# Patient Record
Sex: FEMALE | Race: WHITE | NOT HISPANIC OR LATINO | Employment: FULL TIME | URBAN - METROPOLITAN AREA
[De-identification: names, ages, dates, MRNs, and addresses within clinical notes are randomized per-mention and may not be internally consistent; named-entity substitution may affect disease eponyms.]

---

## 2017-01-18 ENCOUNTER — ALLSCRIPTS OFFICE VISIT (OUTPATIENT)
Dept: OTHER | Facility: OTHER | Age: 52
End: 2017-01-18

## 2017-03-13 ENCOUNTER — ALLSCRIPTS OFFICE VISIT (OUTPATIENT)
Dept: OTHER | Facility: OTHER | Age: 52
End: 2017-03-13

## 2017-03-13 DIAGNOSIS — E11.9 TYPE 2 DIABETES MELLITUS WITHOUT COMPLICATIONS (HCC): ICD-10-CM

## 2017-07-14 ENCOUNTER — GENERIC CONVERSION - ENCOUNTER (OUTPATIENT)
Dept: OTHER | Facility: OTHER | Age: 52
End: 2017-07-14

## 2017-07-21 ENCOUNTER — ALLSCRIPTS OFFICE VISIT (OUTPATIENT)
Dept: OTHER | Facility: OTHER | Age: 52
End: 2017-07-21

## 2017-10-20 ENCOUNTER — ALLSCRIPTS OFFICE VISIT (OUTPATIENT)
Dept: OTHER | Facility: OTHER | Age: 52
End: 2017-10-20

## 2017-10-20 LAB — HBA1C MFR BLD HPLC: 7.2 %

## 2017-10-21 NOTE — PROGRESS NOTES
Assessment  1  Diabetes mellitus (250 00) (E11 9)   2  Benign essential hypertension (401 1) (I10)   3  Acute sinusitis (461 9) (J01 90)   4  Hyperlipemia (272 4) (E78 5)    Plan  Acute sinusitis    · Azithromycin 250 MG Oral Tablet; TAKE 2 TABLETS ON DAY 1 THEN TAKE 1 TABLET A DAY  FOR 4 DAYS  Benign essential hypertension    · Lisinopril 20 MG Oral Tablet; TAKE 1 TABLET BY MOUTH ONCE DAILY  Diabetes mellitus    · GlipiZIDE 10 MG Oral Tablet; TAKE 1 TABLET DAILY   · MetFORMIN HCl - 1000 MG Oral Tablet; take one tablet by mouth twice daily   · *VB - Foot Exam; Status:Complete - Retrospective Authorization;   Done: 81DXQ8254 11:07AM   · Hemoglobin A1c- POC; Status:Complete - Retrospective Authorization;   Done: 22UMW7096 11:06AM  Health Maintenance    · Stop: Fluzone Quadrivalent Intramuscular Suspension  Hyperlipemia    · Gemfibrozil 600 MG Oral Tablet; TAKE 1 TABLET TWICE DAILY, ONE HOUR BEFORE  MEALS   · Welchol 625 MG Oral Tablet; TAKE 3 TABLETS TWICE DAILY   · Zetia 10 MG Oral Tablet (Ezetimibe); Take 1 tablet daily    Discussion/Summary  Discussion Summary: We will followup in 3 months and see how things go  Counseling Documentation With Imm: The patient was counseled regarding impressions  Chief Complaint  Chief Complaint Free Text Note Form: Pt presents today for RTN c/o nasal congestion  Foot exam due today  HgbA1c today 7 2%  Declines flu shot  History of Present Illness  HPI: The patient was seen and examined and noted to have issues with sinus symptoms  We last HgbA1c was 7 2% and she has had no hypoglycemia at this point  The patient states that she has had no issues with her current cholesterol meds  Her BP is elevated, but she is tolerating the Lisinopril 20 mg  Cold Symptoms:   BABAR LEUNG presents with complaints of sudden onset of constant episodes of moderate cold symptoms  Episodes started 7 days ago  Her symptoms are probably caused by community exposure to URI   Symptoms are improved by OTC cold medications  Symptoms are worsening  Associated symptoms include nasal congestion,-- runny nose,-- post nasal drainage,-- sore throat,-- productive cough-- and-- facial pressure, but-- no sneezing,-- no scratchy throat,-- no hoarseness,-- no dry cough,-- no facial pain,-- no headache,-- no plugged ear(s),-- no ear pain,-- no swollen lymph nodes,-- no wheezing,-- no shortness of breath,-- no fatigue,-- no weakness,-- no nausea,-- no vomiting,-- no diarrhea,-- no fever-- and-- no chills  Review of Systems  Complete-Female:   Constitutional: as noted in HPI,-- no fever,-- no recent weight gain-- and-- no chills  Eyes: No complaints of eye pain, no red eyes, no eyesight problems, no discharge, no dry eyes, no itching of eyes  ENT: as noted in HPI  Cardiovascular: no chest pain,-- no intermittent leg claudication-- and-- no palpitations  Respiratory: as noted in HPI,-- no shortness of breath-- and-- no orthopnea  Gastrointestinal: as noted in HPI  Musculoskeletal: no arthralgias-- and-- no myalgias  Neurological: as noted in HPI  Active Problems  1  Acute sinusitis (461 9) (J01 90)   2  Benign essential hypertension (401 1) (I10)   3  Diabetes mellitus (250 00) (E11 9)   4  History of stroke without residual deficits (V12 54) (Z86 73)   5  Hyperlipemia (272 4) (E78 5)   6  Labyrinthitis, unspecified laterality (386 30) (H83 09)   7  Need for influenza vaccination (V04 81) (Z23)   8  Non Hodgkin's lymphoma (202 80) (C85 90)   9  Screening for depression (V79 0) (Z13 89)   10  Skin cancer (173 90) (C44 90)   11  Systemic lupus erythematosus (710 0) (M32 9)   12  Visit for screening mammogram (V76 12) (Z12 31)    Past Medical History  1  Denied: History of mental disorder   2  Denied: History of substance abuse    Family History  Mother    1  Family history of dementia (V17 2) (Z81 8)   2  Family history of Parkinsonism (V17 2) (Z82 0)  Family History    3   Denied: Family history of mental disorder   4  Denied: Family history of substance abuse    Social History   · Denied: History of Alcohol use   · Never a smoker    Current Meds   1  Aggrenox  MG Oral Capsule Extended Release 12 Hour; TAKE 1 CAPSULE TWICE DAILY   WITH MEALS; Therapy: 87VIO2135 to ()  Requested for: 08OOW5114; Last Rx:02Nov2016   Ordered   2  BusPIRone HCl - 15 MG Oral Tablet; take 1/2 tablet twice daily; Therapy: 96LGS4690 to ()  Requested for: 81OKZ8532; Last Rx:02Nov2016   Ordered   3  FreeStyle Test In Vitro Strip; USE 1 STRIP TWICE DAILY; Therapy: 66SER1318 to ()  Requested for: 17FXY8157; Last Rx:02Nov2016   Ordered   4  Gemfibrozil 600 MG Oral Tablet; TAKE 1 TABLET TWICE DAILY, ONE HOUR BEFORE MEALS; Therapy: 29RSL0935 to ()  Requested for: 01Dag3483; Last Rx:02Nov2016   Ordered   5  GlipiZIDE 10 MG Oral Tablet; TAKE 1 TABLET DAILY; Therapy: 39LDM3113 to ()  Requested for: 61Cej0065; Last Rx:02Nov2016   Ordered   6  Lisinopril 20 MG Oral Tablet; TAKE 1 TABLET BY MOUTH ONCE DAILY; Therapy: 83HAP1510 to ()  Requested for: 57Jgl8791; Last Rx:02Nov2016   Ordered   7  MetFORMIN HCl - 1000 MG Oral Tablet; take one tablet by mouth twice daily; Therapy: 48IWR9201 to ()  Requested for: 27Sff5158; Last Rx:02Nov2016   Ordered   8  Welchol 625 MG Oral Tablet; TAKE 3 TABLETS TWICE DAILY; Therapy: 63RCJ1400 to ()  Requested for: 32Gfy1115; Last Rx:02Nov2016   Ordered   9  Zetia 10 MG Oral Tablet; Take 1 tablet daily  Requested for: 41Boq5520; Last Rx:02Nov2016 Ordered  Medication List Reviewed: The medication list was reviewed and updated today  Allergies  1  Penicillins   2  Statins  3   Seafood    Vitals  Vital Signs    Recorded: 50RQF1124 10:59AM   Temperature 98 F   Heart Rate 86   Respiration 18   Systolic 353   Diastolic 82   Height 5 ft 1 in Weight 225 lb 2 oz   BMI Calculated 42 54   BSA Calculated 1 99   O2 Saturation 98     Physical Exam    Constitutional   General appearance: No acute distress, well appearing and well nourished  Eyes   Conjunctiva and lids: No swelling, erythema or discharge  Ears, Nose, Mouth, and Throat   External inspection of ears and nose: Normal     Otoscopic examination: Tympanic membranes translucent with normal light reflex  Canals patent without erythema  Nasal mucosa, septum, and turbinates: Normal without edema or erythema  Oropharynx: Normal with no erythema, edema, exudate or lesions  Pulmonary   Respiratory effort: No increased work of breathing or signs of respiratory distress  Auscultation of lungs: Clear to auscultation  Auscultation of the lungs revealed no expiratory wheezing,-- normal expiratory time-- and-- no inspiratory wheezing  no rales or crackles were heard bilaterally  no rhonchi  no friction rub  no wheezing  no diminished breath sounds  no bronchial breath sounds  Cardiovascular   Auscultation of heart: Normal rate and rhythm, normal S1 and S2, without murmurs  Examination of extremities for edema and/or varicosities: Normal     Carotid pulses: Normal     Abdomen   Abdomen: Non-tender, no masses  Lymphatic   Palpation of lymph nodes in neck: No lymphadenopathy  Musculoskeletal   Gait and station: Normal     Skin   Skin and subcutaneous tissue: Normal without rashes or lesions  Neurologic   Cranial nerves: Cranial nerves 2-12 intact  Psychiatric   Mood and affect: Normal     Diabetic Foot Screen: Normal     Socks and shoes removed, the Right Foot: the foot was normal, no swelling, no erythema  The toes on the right were normal-- and-- with full range of motion  The sensory exam showed  normal vibratory sensation at the level of the toes on the right  Normal tactile sensation with monofilament testing throughout the right foot     Socks and shoes removed, Left Foot: the foot was normal, no swelling, no erythema  The toes on the left were normal-- and-- with full range of motion  The sensory exam showed  normal vibratory sensation at the level of the toes on the left , Normal tactile sensation with monofilament testing throughout the left foot  Capillary refills findings on the right were normal in the toes  Pulses:   2+ in the posterior tibialis on the right   2+ in the dorsalis pedis on the right  Capillary refills findings on the left were normal in the toes  Pulses:   2+ in the posterior tibialis on the left   2+ in the dorsalis pedis on the left  Assign Risk Category: 0: No loss of protective sensation, no deformity  No present risk        Results/Data  *VB - Foot Exam [id number] 11:07AM Nettie Cotton     Test Name Result Flag Reference   FOOT EXAM 10/20/17       Hemoglobin A1c- POC 80GHY6019 11:06AM Nettie Cotton     Test Name Result Flag Reference   HEMOGLOBIN A1C 7 2 A        Signatures   Electronically signed by : Nancy Heath DO; Oct 20 2017 11:37AM EST                       (Author)

## 2017-11-16 ENCOUNTER — GENERIC CONVERSION - ENCOUNTER (OUTPATIENT)
Dept: OTHER | Facility: OTHER | Age: 52
End: 2017-11-16

## 2018-01-13 VITALS
WEIGHT: 226 LBS | HEART RATE: 88 BPM | TEMPERATURE: 97.7 F | DIASTOLIC BLOOD PRESSURE: 76 MMHG | BODY MASS INDEX: 42.67 KG/M2 | RESPIRATION RATE: 18 BRPM | SYSTOLIC BLOOD PRESSURE: 124 MMHG | HEIGHT: 61 IN

## 2018-01-13 VITALS
SYSTOLIC BLOOD PRESSURE: 122 MMHG | BODY MASS INDEX: 42.1 KG/M2 | WEIGHT: 223 LBS | RESPIRATION RATE: 18 BRPM | OXYGEN SATURATION: 98 % | HEIGHT: 61 IN | HEART RATE: 96 BPM | DIASTOLIC BLOOD PRESSURE: 80 MMHG | TEMPERATURE: 98 F

## 2018-01-13 VITALS
TEMPERATURE: 98.8 F | DIASTOLIC BLOOD PRESSURE: 84 MMHG | WEIGHT: 221 LBS | BODY MASS INDEX: 41.72 KG/M2 | RESPIRATION RATE: 18 BRPM | HEIGHT: 61 IN | HEART RATE: 96 BPM | SYSTOLIC BLOOD PRESSURE: 132 MMHG

## 2018-01-13 VITALS
HEART RATE: 86 BPM | OXYGEN SATURATION: 98 % | DIASTOLIC BLOOD PRESSURE: 82 MMHG | WEIGHT: 225.13 LBS | TEMPERATURE: 98 F | RESPIRATION RATE: 18 BRPM | BODY MASS INDEX: 42.51 KG/M2 | HEIGHT: 61 IN | SYSTOLIC BLOOD PRESSURE: 136 MMHG

## 2018-01-16 NOTE — MISCELLANEOUS
Message  Pt stopped in office today stating she is out of generic Aggrenox and according to the task of 11/14/17 a 30 day supply was to be sent into [facility] until she received her rx from [facility]  Phoned in 30 day supply for her and she will be contacting [facility] again to find out why they are not sending her scipt  Active Problems    1  Acute sinusitis (461 9) (J01 90)   2  Benign essential hypertension (401 1) (I10)   3  Diabetes mellitus (250 00) (E11 9)   4  History of stroke without residual deficits (V12 54) (Z86 73)   5  Hyperlipemia (272 4) (E78 5)   6  Labyrinthitis, unspecified laterality (386 30) (H83 09)   7  Need for influenza vaccination (V04 81) (Z23)   8  Non Hodgkin's lymphoma (202 80) (C85 90)   9  Screening for depression (V79 0) (Z13 89)   10  Skin cancer (173 90) (C44 90)   11  Systemic lupus erythematosus (710 0) (M32 9)   12  Visit for screening mammogram (V76 12) (Z12 31)    Current Meds   1  Aspirin-Dipyridamole ER  MG Oral Capsule Extended Release 12 Hour; TAKE 1   CAPSULE TWICE A DAY WITH MEALS; Therapy: 54STC7434 to (Last Rx:30Oct2017)  Requested for: 30Oct2017 Ordered   2  Aspirin-Dipyridamole ER  MG Oral Capsule Extended Release 12 Hour; TAKE 1   CAPSULE TWICE DAILY WITH MEALS; Therapy: 75ELM6522 to (Noa Bennett)  Requested for: 10OKE8588; Last   Rx:14Nov2017 Ordered   3  Azithromycin 250 MG Oral Tablet; TAKE 2 TABLETS ON DAY 1 THEN TAKE 1 TABLET A   DAY FOR 4 DAYS; Therapy: 31ESP1931 to (21 234 564 )  Requested for: 78IGS7584; Last   Rx:20Oct2017 Ordered   4  BusPIRone HCl - 15 MG Oral Tablet; take 1/2 tablet twice daily; Therapy: 69NWN1978 to (21 )  Requested for: 82OAW5248; Last   Rx:02Nov2016 Ordered   5  FreeStyle Test In Vitro Strip; USE 1 STRIP TWICE DAILY; Therapy: 14MAR3064 to (21 )  Requested for: 51TCI8324; Last   Rx:02Nov2016 Ordered   6   Gemfibrozil 600 MG Oral Tablet; TAKE 1 TABLET TWICE DAILY, ONE HOUR BEFORE   MEALS; Therapy: 27TNH1230 to (032 304 86 43)  Requested for: 31BLE8260; Last   Rx:02Nov2016 Ordered   7  GlipiZIDE 10 MG Oral Tablet; TAKE 1 TABLET DAILY; Therapy: 11YWU4911 to (032 304 86 43)  Requested for: 73DRD1450; Last   Rx:02Nov2016 Ordered   8  Lisinopril 20 MG Oral Tablet; TAKE 1 TABLET BY MOUTH ONCE DAILY; Therapy: 50FYB4054 to (032 304 86 43)  Requested for: 99KRN5462; Last   Rx:02Nov2016 Ordered   9  MetFORMIN HCl - 1000 MG Oral Tablet; take one tablet by mouth twice daily; Therapy: 36OLC8016 to (032 304 86 43)  Requested for: 93APJ2874; Last   Rx:02Nov2016 Ordered   10  Welchol 625 MG Oral Tablet; TAKE 3 TABLETS TWICE DAILY; Therapy: 46YWD7589 to (032 304 86 43)  Requested for: 29HBY2819; Last    Rx:02Nov2016 Ordered   11  Zetia 10 MG Oral Tablet; Take 1 tablet daily  Requested for: 01HOS2451; Last    Rx:02Nov2016 Ordered    Allergies    1  Penicillins   2  Statins    3   Seafood    Signatures   Electronically signed by : Ralph Berg, ; Nov 16 2017  2:40PM EST                       (Author)

## 2018-04-08 ENCOUNTER — OFFICE VISIT (OUTPATIENT)
Dept: URGENT CARE | Facility: CLINIC | Age: 53
End: 2018-04-08
Payer: COMMERCIAL

## 2018-04-08 VITALS
RESPIRATION RATE: 18 BRPM | HEART RATE: 87 BPM | OXYGEN SATURATION: 97 % | SYSTOLIC BLOOD PRESSURE: 170 MMHG | TEMPERATURE: 97 F | DIASTOLIC BLOOD PRESSURE: 92 MMHG

## 2018-04-08 DIAGNOSIS — N30.00 ACUTE CYSTITIS WITHOUT HEMATURIA: Primary | ICD-10-CM

## 2018-04-08 LAB
SL AMB  POCT GLUCOSE, UA: ABNORMAL
SL AMB LEUKOCYTE ESTERASE,UA: ABNORMAL
SL AMB POCT BILIRUBIN,UA: ABNORMAL
SL AMB POCT BLOOD,UA: ABNORMAL
SL AMB POCT CLARITY,UA: CLEAR
SL AMB POCT COLOR,UA: YELLOW
SL AMB POCT KETONES,UA: ABNORMAL
SL AMB POCT NITRITE,UA: ABNORMAL
SL AMB POCT PH,UA: 5
SL AMB POCT SPECIFIC GRAVITY,UA: 1.02
SL AMB POCT URINE PROTEIN: ABNORMAL
SL AMB POCT UROBILINOGEN: ABNORMAL

## 2018-04-08 PROCEDURE — 99203 OFFICE O/P NEW LOW 30 MIN: CPT | Performed by: PHYSICIAN ASSISTANT

## 2018-04-08 PROCEDURE — 87086 URINE CULTURE/COLONY COUNT: CPT | Performed by: PHYSICIAN ASSISTANT

## 2018-04-08 PROCEDURE — 81002 URINALYSIS NONAUTO W/O SCOPE: CPT | Performed by: PHYSICIAN ASSISTANT

## 2018-04-08 RX ORDER — GEMFIBROZIL 600 MG/1
TABLET, FILM COATED ORAL 2 TIMES DAILY
COMMUNITY
Start: 2018-01-23 | End: 2019-04-22 | Stop reason: SDUPTHER

## 2018-04-08 RX ORDER — BUSPIRONE HYDROCHLORIDE 15 MG/1
5 TABLET ORAL 2 TIMES DAILY
COMMUNITY
End: 2019-08-16

## 2018-04-08 RX ORDER — LISINOPRIL 20 MG/1
20 TABLET ORAL
COMMUNITY
End: 2018-10-03 | Stop reason: SDUPTHER

## 2018-04-08 RX ORDER — COLESEVELAM 180 1/1
TABLET ORAL
COMMUNITY
End: 2019-04-22 | Stop reason: SDUPTHER

## 2018-04-08 RX ORDER — EZETIMIBE 10 MG/1
10 TABLET ORAL
COMMUNITY
End: 2018-04-19

## 2018-04-08 RX ORDER — NITROFURANTOIN 25; 75 MG/1; MG/1
100 CAPSULE ORAL 2 TIMES DAILY
Qty: 14 CAPSULE | Refills: 0 | Status: SHIPPED | OUTPATIENT
Start: 2018-04-08 | End: 2018-04-15

## 2018-04-08 NOTE — PROGRESS NOTES
[facility]        NAME: Doug Dunbar is a 46 y o  female  : 1965    MRN: [id number]  DATE: 2018  TIME: 5:13 PM    Assessment and Plan   Acute cystitis without hematuria [N30 00]  1  Acute cystitis without hematuria  nitrofurantoin (MACROBID) 100 mg capsule         Patient Instructions     Follow up with PCP in 3-5 days  Proceed to  ER if symptoms worsen  Chief Complaint     Chief Complaint   Patient presents with    Urinary Frequency     Pt c/o urinary frequency and burning upon urination for a week  History of Present Illness       Patient presents with a 1 day complaint of urinary urgency, frequency and burning  She denies any fever, chills, sweats, blood in her urine, back pain, pelvic pain  Review of Systems   Review of Systems   Constitutional: Negative  HENT: Negative  Eyes: Negative  Respiratory: Negative  Gastrointestinal: Negative  Genitourinary: Positive for decreased urine volume, dysuria, frequency and urgency  Negative for difficulty urinating, flank pain, hematuria and pelvic pain           Current Medications       Current Outpatient Prescriptions:     busPIRone (BUSPAR) 15 mg tablet, Take 15 mg by mouth, Disp: , Rfl:     colesevelam (WELCHOL) 625 mg tablet, Take 1,875 mg by mouth, Disp: , Rfl:     ezetimibe (ZETIA) 10 mg tablet, Take 10 mg by mouth, Disp: , Rfl:     gemfibrozil (LOPID) 600 mg tablet, , Disp: , Rfl:     lisinopril (ZESTRIL) 20 mg tablet, Take 20 mg by mouth, Disp: , Rfl:     metFORMIN (GLUCOPHAGE) 1000 MG tablet, Take 1,000 mg by mouth, Disp: , Rfl:     nitrofurantoin (MACROBID) 100 mg capsule, Take 1 capsule (100 mg total) by mouth 2 (two) times a day for 7 days, Disp: 14 capsule, Rfl: 0    Current Allergies     Allergies as of 2018 - Reviewed 2018   Allergen Reaction Noted    Penicillins Rash 2016            The following portions of the patient's history were reviewed and updated as appropriate: allergies, current medications, past family history, past medical history, past social history, past surgical history and problem list     Objective   /92   Pulse 87   Temp (!) 97 °F (36 1 °C)   Resp 18   SpO2 97%        Physical Exam     Physical Exam   Constitutional: She appears well-developed and well-nourished  No distress  Cardiovascular: Normal rate and regular rhythm  Pulmonary/Chest: Effort normal and breath sounds normal    Abdominal: There is no tenderness  There is no CVA tenderness  Nursing note and vitals reviewed

## 2018-04-08 NOTE — PATIENT INSTRUCTIONS
Urinary infection:  Advised patient to  Macrobid start as soon as possible  Will order a urine culture to be done  If symptoms are not improved within 2 days she is to call for the results  Dysuria   WHAT YOU NEED TO KNOW:   Dysuria is difficulty urinating, or pain, burning, or discomfort with urination  Dysuria is usually a symptom of another problem  DISCHARGE INSTRUCTIONS:   Return to the emergency department if:   · You have severe back, side, or abdominal pain  · You have fever and shaking chills  · You vomit several times in a row  Contact your healthcare provider if:   · Your symptoms do not go away, even after treatment  · You have questions or concerns about your condition or care  Medicines:   · Medicines  may be given to help treat a bacterial infection or help decrease bladder spasms  · Take your medicine as directed  Contact your healthcare provider if you think your medicine is not helping or if you have side effects  Tell him of her if you are allergic to any medicine  Keep a list of the medicines, vitamins, and herbs you take  Include the amounts, and when and why you take them  Bring the list or the pill bottles to follow-up visits  Carry your medicine list with you in case of an emergency  Follow up with your healthcare provider as directed: Your healthcare provider may also refer you to a urologist or nephrologist to have additional testing  Write down your questions so you remember to ask them during your visits  Manage your dysuria:   · Drink more liquids  Liquids help flush out bacteria that may be causing an infection  Ask your healthcare provider how much liquid to drink each day and which liquids are best for you  · Take sitz baths as directed  Fill a bathtub with 4 to 6 inches of warm water  You may also use a sitz bath pan that fits over a toilet  Sit in the sitz bath for 20 minutes  Do this 2 to 3 times a day, or as directed   The warm water can help decrease pain and swelling  © 2017 [employer] Information is for End User's use only and may not be sold, redistributed or otherwise used for commercial purposes  All illustrations and images included in CareNotes® are the copyrighted property of [employer]  or David Morton  The above information is an  only  It is not intended as medical advice for individual conditions or treatments  Talk to your doctor, nurse or pharmacist before following any medical regimen to see if it is safe and effective for you

## 2018-04-10 LAB — BACTERIA UR CULT: NORMAL

## 2018-04-19 ENCOUNTER — OFFICE VISIT (OUTPATIENT)
Dept: INTERNAL MEDICINE CLINIC | Facility: CLINIC | Age: 53
End: 2018-04-19
Payer: COMMERCIAL

## 2018-04-19 VITALS
TEMPERATURE: 97.9 F | WEIGHT: 221.8 LBS | SYSTOLIC BLOOD PRESSURE: 142 MMHG | OXYGEN SATURATION: 98 % | HEIGHT: 60 IN | HEART RATE: 80 BPM | BODY MASS INDEX: 43.55 KG/M2 | RESPIRATION RATE: 16 BRPM | DIASTOLIC BLOOD PRESSURE: 86 MMHG

## 2018-04-19 DIAGNOSIS — E11.8 TYPE 2 DIABETES MELLITUS WITH COMPLICATION, UNSPECIFIED WHETHER LONG TERM INSULIN USE: ICD-10-CM

## 2018-04-19 DIAGNOSIS — I10 BENIGN ESSENTIAL HYPERTENSION: Primary | ICD-10-CM

## 2018-04-19 DIAGNOSIS — E78.5 HYPERLIPIDEMIA, UNSPECIFIED HYPERLIPIDEMIA TYPE: ICD-10-CM

## 2018-04-19 DIAGNOSIS — M32.19 SYSTEMIC LUPUS ERYTHEMATOSUS WITH OTHER ORGAN INVOLVEMENT, UNSPECIFIED SLE TYPE (HCC): ICD-10-CM

## 2018-04-19 PROBLEM — N13.30 BILATERAL HYDRONEPHROSIS: Status: ACTIVE | Noted: 2018-01-24

## 2018-04-19 PROBLEM — R76.0 LUPUS ANTICOAGULANT POSITIVE: Status: ACTIVE | Noted: 2018-01-24

## 2018-04-19 PROBLEM — E04.1 LEFT THYROID NODULE: Status: ACTIVE | Noted: 2018-01-24

## 2018-04-19 PROCEDURE — 99214 OFFICE O/P EST MOD 30 MIN: CPT | Performed by: INTERNAL MEDICINE

## 2018-04-19 RX ORDER — BLOOD-GLUCOSE METER
KIT MISCELLANEOUS 2 TIMES DAILY
Qty: 1 EACH | Refills: 0 | Status: SHIPPED | OUTPATIENT
Start: 2018-04-19

## 2018-04-19 RX ORDER — BLOOD-GLUCOSE METER
KIT MISCELLANEOUS 2 TIMES DAILY
Qty: 1 EACH | Refills: 0 | Status: SHIPPED | OUTPATIENT
Start: 2018-04-19 | End: 2018-04-19 | Stop reason: SDUPTHER

## 2018-04-19 RX ORDER — ASPIRIN AND DIPYRIDAMOLE 25; 200 MG/1; MG/1
1 CAPSULE, EXTENDED RELEASE ORAL 2 TIMES DAILY
COMMUNITY
Start: 2018-02-15 | End: 2018-10-03 | Stop reason: SDUPTHER

## 2018-04-19 NOTE — PROGRESS NOTES
Assessment/Plan:       Diagnoses and all orders for this visit:    Benign essential hypertension    Systemic lupus erythematosus with other organ involvement, unspecified [facility] )  -     LEBRON Screen w/ Reflex to Titer/Pattern; Future  -     Sedimentation rate, automated; Future  -     C-reactive protein; Future    Type 2 diabetes mellitus with complication, unspecified whether long term insulin use (HCC)  -     HEMOGLOBIN A1C W/ EAG ESTIMATION  -     Blood Glucose Monitoring Suppl (FREESTYLE LITE) ASHLEY; by Does not apply route 2 (two) times a day  -     Microalbumin / creatinine urine ratio    Hyperlipidemia, unspecified hyperlipidemia type  -     Lipid Panel with Direct LDL reflex    Other orders  -     aspirin-dipyridamole (AGGRENOX)  mg per 12 hr capsule; Take 1 capsule by mouth 2 (two) times a day          The patient notes a history of Lupus anticoagulant, but also no Lupus  We will follow up on this and see if this is the case  Her thought content appeared to be appropriate    Subjective:      Patient ID: Shobha Mcguire is a 46 y o  female  The patient was seen examined  The patient notes a history of lupus anticoagulant  However, she was states that she was also diagnosed with lupus erythematosus  The patient is recently no treatment at this time has not been referred to Rheumatology  The original diagnosis came from her hematologist that treats her lymphoma  Will follow up on labs try to verify the diagnosis  The patient is doing well with her diabetes medications and notes no hypoglycemia  Will follow up on hemoglobin A1c and see how she is doing  The patient's blood pressure is mildly hypertensive today and we will continue to follow this and adjust this on the next visit if necessary  Hypertension   This is a chronic problem  The problem is uncontrolled   Pertinent negatives include no anxiety, blurred vision, chest pain, malaise/fatigue, palpitations, peripheral edema or shortness of breath  There are no associated agents to hypertension  Risk factors for coronary artery disease include diabetes mellitus, dyslipidemia, sedentary lifestyle and obesity  Past treatments include ACE inhibitors  The current treatment provides moderate improvement  There are no compliance problems  Hypertensive end-organ damage includes CVA  There is no history of angina, kidney disease, heart failure or PVD  Diabetes   She presents for her follow-up diabetic visit  She has type 2 diabetes mellitus  No MedicAlert identification noted  Her disease course has been stable  There are no hypoglycemic associated symptoms  Pertinent negatives for hypoglycemia include no dizziness  Pertinent negatives for diabetes include no blurred vision, no chest pain, no fatigue, no foot ulcerations, no polydipsia, no polyphagia and no weakness  Symptoms are stable  Diabetic complications include a CVA  Pertinent negatives for diabetic complications include no autonomic neuropathy or PVD  She is compliant with treatment all of the time  Her weight is decreasing steadily  When asked about meal planning, she reported none  She has not had a previous visit with a dietitian  She never participates in exercise  There is no change in her home blood glucose trend  An ACE inhibitor/angiotensin II receptor blocker is being taken  The following portions of the patient's history were reviewed and updated as appropriate:   She has a past medical history of Anxiety; Bilateral hydronephrosis (1/24/2018); Diabetes mellitus ([facility] ); Hypertension; Left thyroid nodule (1/24/2018); Lupus; Lupus anticoagulant positive (1/24/2018); Non Hodgkin's lymphoma ([facility] ) (11/2/2016); Panic attack; and Urinary tract infection  ,   does not have any pertinent problems on file  ,   has no past surgical history on file  ,  family history includes Dementia in her mother; Parkinsonism in her mother  ,   reports that she has never smoked   She has never used smokeless tobacco  She reports that she does not drink alcohol or use drugs  ,  is allergic to clam shell and penicillins     Current Outpatient Prescriptions   Medication Sig Dispense Refill    aspirin-dipyridamole (AGGRENOX)  mg per 12 hr capsule Take 1 capsule by mouth 2 (two) times a day      busPIRone (BUSPAR) 15 mg tablet Take 15 mg by mouth TAKE 1/2 TABLET TWICE DAILY       colesevelam (WELCHOL) 625 mg tablet Take 1,875 mg by mouth      gemfibrozil (LOPID) 600 mg tablet 2 (two) times a day        lisinopril (ZESTRIL) 20 mg tablet Take 20 mg by mouth      metFORMIN (GLUCOPHAGE) 1000 MG tablet Take 1,000 mg by mouth Take 1/2 in morning and 1 tablet in the evening       Blood Glucose Monitoring Suppl (FREESTYLE LITE) ASHLEY by Does not apply route 2 (two) times a day 1 each 0     No current facility-administered medications for this visit  Review of Systems   Constitutional: Negative for chills, fatigue, fever and malaise/fatigue  HENT: Negative for ear pain, sinus pain and sinus pressure  Eyes: Negative  Negative for blurred vision  Respiratory: Negative for chest tightness and shortness of breath  Cardiovascular: Negative for chest pain, palpitations and leg swelling  Gastrointestinal: Negative for abdominal pain, constipation, diarrhea, nausea and vomiting  Endocrine: Negative for polydipsia and polyphagia  Genitourinary: Negative  Musculoskeletal: Negative for arthralgias and myalgias  Skin: Negative  Neurological: Negative for dizziness, weakness, light-headedness and numbness  Hematological: Negative  Psychiatric/Behavioral: Negative  Objective:  Vitals:    04/19/18 1002   BP: 142/86   BP Location: Right arm   Patient Position: Sitting   Cuff Size: Large   Pulse: 80   Resp: 16   Temp: 97 9 °F (36 6 °C)   SpO2: 98%   Weight: 101 kg (221 lb 12 8 oz)   Height: 5' (1 524 m)     Body mass index is 43 32 kg/m²       Physical Exam   Constitutional: She is oriented to person, place, and time  She appears well-developed and well-nourished  HENT:   Head: Normocephalic and atraumatic  Eyes: Conjunctivae and EOM are normal  Pupils are equal, round, and reactive to light  Cardiovascular: Normal rate and regular rhythm  Pulmonary/Chest: Effort normal and breath sounds normal    Abdominal: Soft  Bowel sounds are normal    Musculoskeletal: Normal range of motion  Neurological: She is alert and oriented to person, place, and time  She has normal reflexes  Skin: Skin is warm and dry  Psychiatric: She has a normal mood and affect

## 2018-07-09 ENCOUNTER — APPOINTMENT (OUTPATIENT)
Dept: LAB | Facility: HOSPITAL | Age: 53
End: 2018-07-09
Payer: COMMERCIAL

## 2018-07-09 DIAGNOSIS — M32.19 SYSTEMIC LUPUS ERYTHEMATOSUS WITH OTHER ORGAN INVOLVEMENT, UNSPECIFIED SLE TYPE (HCC): ICD-10-CM

## 2018-07-09 LAB
CHOLEST SERPL-MCNC: 233 MG/DL (ref 0–200)
CREAT UR-MCNC: 126 MG/DL
CRP SERPL QL: <3 MG/L
ERYTHROCYTE [SEDIMENTATION RATE] IN BLOOD: 40 MM/HOUR (ref 0–30)
EST. AVERAGE GLUCOSE BLD GHB EST-MCNC: 217 MG/DL
HBA1C MFR BLD: 9.2 % (ref 4.2–6.3)
HDLC SERPL-MCNC: 49 MG/DL (ref 40–60)
LDLC SERPL CALC-MCNC: 146 MG/DL (ref 75–193)
MICROALBUMIN UR-MCNC: 43.9 MG/L (ref 0–20)
MICROALBUMIN/CREAT 24H UR: 35 MG/G CREATININE (ref 0–30)
TRIGL SERPL-MCNC: 190 MG/DL (ref 44–166)

## 2018-07-09 PROCEDURE — 3060F POS MICROALBUMINURIA REV: CPT | Performed by: INTERNAL MEDICINE

## 2018-07-09 PROCEDURE — 36415 COLL VENOUS BLD VENIPUNCTURE: CPT | Performed by: INTERNAL MEDICINE

## 2018-07-09 PROCEDURE — 86140 C-REACTIVE PROTEIN: CPT

## 2018-07-09 PROCEDURE — 82043 UR ALBUMIN QUANTITATIVE: CPT

## 2018-07-09 PROCEDURE — 86038 ANTINUCLEAR ANTIBODIES: CPT

## 2018-07-09 PROCEDURE — 80061 LIPID PANEL: CPT | Performed by: INTERNAL MEDICINE

## 2018-07-09 PROCEDURE — 83036 HEMOGLOBIN GLYCOSYLATED A1C: CPT | Performed by: INTERNAL MEDICINE

## 2018-07-09 PROCEDURE — 82570 ASSAY OF URINE CREATININE: CPT

## 2018-07-09 PROCEDURE — 85652 RBC SED RATE AUTOMATED: CPT

## 2018-07-11 LAB — RYE IGE QN: NEGATIVE

## 2018-08-01 ENCOUNTER — OFFICE VISIT (OUTPATIENT)
Dept: INTERNAL MEDICINE CLINIC | Facility: CLINIC | Age: 53
End: 2018-08-01
Payer: COMMERCIAL

## 2018-08-01 VITALS
OXYGEN SATURATION: 98 % | SYSTOLIC BLOOD PRESSURE: 134 MMHG | DIASTOLIC BLOOD PRESSURE: 78 MMHG | HEART RATE: 102 BPM | BODY MASS INDEX: 44.1 KG/M2 | HEIGHT: 60 IN | WEIGHT: 224.6 LBS | RESPIRATION RATE: 18 BRPM | TEMPERATURE: 98.1 F

## 2018-08-01 DIAGNOSIS — E78.2 MIXED HYPERLIPIDEMIA: ICD-10-CM

## 2018-08-01 DIAGNOSIS — E11.9 TYPE 2 DIABETES MELLITUS WITHOUT COMPLICATION, WITHOUT LONG-TERM CURRENT USE OF INSULIN (HCC): Primary | ICD-10-CM

## 2018-08-01 DIAGNOSIS — I10 BENIGN ESSENTIAL HYPERTENSION: ICD-10-CM

## 2018-08-01 DIAGNOSIS — M25.50 POLYARTHRALGIA: ICD-10-CM

## 2018-08-01 PROCEDURE — 99214 OFFICE O/P EST MOD 30 MIN: CPT | Performed by: INTERNAL MEDICINE

## 2018-08-01 PROCEDURE — 3008F BODY MASS INDEX DOCD: CPT | Performed by: INTERNAL MEDICINE

## 2018-08-01 PROCEDURE — 3075F SYST BP GE 130 - 139MM HG: CPT | Performed by: INTERNAL MEDICINE

## 2018-08-01 PROCEDURE — 3078F DIAST BP <80 MM HG: CPT | Performed by: INTERNAL MEDICINE

## 2018-08-01 RX ORDER — BLOOD-GLUCOSE METER
1 KIT MISCELLANEOUS 2 TIMES DAILY
Qty: 1 EACH | Refills: 0 | Status: SHIPPED | OUTPATIENT
Start: 2018-08-01 | End: 2018-08-01 | Stop reason: CLARIF

## 2018-08-01 RX ORDER — BLOOD SUGAR DIAGNOSTIC
STRIP MISCELLANEOUS
COMMUNITY
Start: 2018-05-16 | End: 2019-08-16 | Stop reason: SDUPTHER

## 2018-08-01 RX ORDER — BLOOD-GLUCOSE METER
1 KIT MISCELLANEOUS 2 TIMES DAILY
Qty: 1 EACH | Refills: 0 | Status: SHIPPED | OUTPATIENT
Start: 2018-08-01

## 2018-08-01 NOTE — PROGRESS NOTES
Assessment/Plan:       Diagnoses and all orders for this visit:    Type 2 diabetes mellitus without complication, without long-term current use of insulin (HCC)  -     canagliflozin (INVOKANA) 100 mg; Take 1 tablet (100 mg total) by mouth daily before breakfast for 180 days  -     glucose monitoring kit (FREESTYLE) monitoring kit; 1 each by Does not apply route 2 (two) times a day    Polyarthralgia  -     Ambulatory referral to Rheumatology; Future    Benign essential hypertension    Mixed hyperlipidemia    Other orders  -     FREESTYLE TEST STRIPS test strip;   -     Discontinue: glucose monitoring kit (FREESTYLE) monitoring kit; 1 each by Does not apply route 2 (two) times a day  -     Discontinue: canagliflozin (INVOKANA) 100 mg; Take 1 tablet (100 mg total) by mouth daily before breakfast for 180 days        No problem-specific Assessment & Plan notes found for this encounter  We will follow up in the next 3 months and we will see how things go  Subjective:      Patient ID: Cheryl Villasenor is a 46 y o  female  The patient is here for her HgbA1c and it noted to be elevated at 9 2%  The patient states that there have been issues with her diet and this is why it is elevated  I noted that we should consider trying addition medications  She is on Glipizide and Metfrormin  The patient was previously on Januvia and noted that she had problems with hypoglycemia  Hence, she will not try that again  We discussed and she is amendable to Invokana and we will start that at the smallest dose  The patient will not try anything for her cholesterol other than the Gemfibrozil for her elevated triglycerides  The patient's labs were reviewed and she was noted to have a mildly elevated Sed Rate, but she does not appear to have lupus  She may have lupus anticoagulant and we will follow up on that at our next labs  She would like to follow up to discuss this with Rheumatology and we will refer her there        Diabetes She presents for her follow-up diabetic visit  She has type 2 diabetes mellitus  Her disease course has been worsening  There are no hypoglycemic associated symptoms  Associated symptoms include fatigue  Pertinent negatives for diabetes include no chest pain  There are no diabetic complications  Risk factors for coronary artery disease include hypertension, dyslipidemia and diabetes mellitus  Current diabetic treatment includes oral agent (dual therapy)  Her weight is stable  There is no change in her home blood glucose trend  An ACE inhibitor/angiotensin II receptor blocker is being taken  Hypertension   Pertinent negatives include no chest pain, palpitations or shortness of breath  The following portions of the patient's history were reviewed and updated as appropriate:   She has a past medical history of Bilateral hydronephrosis (1/24/2018); Left thyroid nodule (1/24/2018); Lupus; Lupus anticoagulant positive (1/24/2018); Non Hodgkin's lymphoma ([facility] [address] ) (11/2/2016); and Panic attack  ,   does not have any pertinent problems on file  ,   has no past surgical history on file  ,  family history includes Dementia in her mother; Parkinsonism in her mother  ,   reports that she has never smoked  She has never used smokeless tobacco  She reports that she does not drink alcohol or use drugs  ,  is allergic to clam shell and penicillins     Current Outpatient Prescriptions   Medication Sig Dispense Refill    aspirin-dipyridamole (AGGRENOX)  mg per 12 hr capsule Take 1 capsule by mouth 2 (two) times a day      Blood Glucose Monitoring Suppl (FREESTYLE LITE) ASHLEY by Does not apply route 2 (two) times a day FreeStyle Lite Device (Glucometer) 1 each 0    busPIRone (BUSPAR) 15 mg tablet Take 15 mg by mouth TAKE 1/2 TABLET TWICE DAILY       colesevelam (WELCHOL) 625 mg tablet Take 1,875 mg by mouth      gemfibrozil (LOPID) 600 mg tablet 2 (two) times a day        lisinopril (ZESTRIL) 20 mg tablet Take 20 mg by mouth      metFORMIN (GLUCOPHAGE) 1000 MG tablet Take 1,000 mg by mouth Take 1/2 in morning and 1 tablet in the evening       canagliflozin (INVOKANA) 100 mg Take 1 tablet (100 mg total) by mouth daily before breakfast for 180 days 30 tablet 0    FREESTYLE TEST STRIPS test strip       glucose monitoring kit (FREESTYLE) monitoring kit 1 each by Does not apply route 2 (two) times a day 1 each 0     No current facility-administered medications for this visit  Review of Systems   Constitutional: Positive for fatigue  Negative for chills and fever  HENT: Negative for ear pain, sinus pain, sinus pressure and sneezing  Respiratory: Negative for choking and shortness of breath  Cardiovascular: Negative for chest pain and palpitations  Gastrointestinal: Negative for abdominal pain, constipation, diarrhea, nausea and vomiting  Genitourinary: Negative  Musculoskeletal: Positive for arthralgias  Negative for back pain  Neurological: Negative  Hematological: Negative  Psychiatric/Behavioral: Negative  Objective:  Vitals:    08/01/18 1136   BP: 134/78   BP Location: Right arm   Patient Position: Sitting   Cuff Size: Large   Pulse: 102   Resp: 18   Temp: 98 1 °F (36 7 °C)   SpO2: 98%   Weight: 102 kg (224 lb 9 6 oz)   Height: 5' (1 524 m)     Body mass index is 43 86 kg/m²  Physical Exam   Constitutional: She is oriented to person, place, and time  She appears well-developed and well-nourished  HENT:   Head: Normocephalic and atraumatic  Eyes: Conjunctivae and EOM are normal  Pupils are equal, round, and reactive to light  Neck: Normal range of motion  Neck supple  Cardiovascular: Normal rate, regular rhythm, normal heart sounds and intact distal pulses  Exam reveals no gallop  No murmur heard  Pulmonary/Chest: Effort normal and breath sounds normal  No respiratory distress  She has no wheezes  She has no rales  Abdominal: Soft   Bowel sounds are normal  She exhibits no distension  There is no tenderness  There is no rebound and no guarding  Musculoskeletal: Normal range of motion  She exhibits no edema or tenderness  Neurological: She is alert and oriented to person, place, and time  Skin: Skin is warm and dry  Psychiatric: She has a normal mood and affect  Vitals reviewed

## 2018-10-03 ENCOUNTER — TELEPHONE (OUTPATIENT)
Dept: INTERNAL MEDICINE CLINIC | Facility: CLINIC | Age: 53
End: 2018-10-03

## 2018-10-03 DIAGNOSIS — I10 ESSENTIAL HYPERTENSION: Primary | ICD-10-CM

## 2018-10-03 PROCEDURE — 4010F ACE/ARB THERAPY RXD/TAKEN: CPT | Performed by: INTERNAL MEDICINE

## 2018-10-03 RX ORDER — ASPIRIN AND DIPYRIDAMOLE 25; 200 MG/1; MG/1
1 CAPSULE, EXTENDED RELEASE ORAL 2 TIMES DAILY
Qty: 60 CAPSULE | Refills: 5 | Status: SHIPPED | OUTPATIENT
Start: 2018-10-03 | End: 2019-04-22

## 2018-10-03 RX ORDER — LISINOPRIL 20 MG/1
20 TABLET ORAL DAILY
Qty: 30 TABLET | Refills: 5 | Status: SHIPPED | OUTPATIENT
Start: 2018-10-03 | End: 2019-08-10 | Stop reason: HOSPADM

## 2018-10-03 NOTE — TELEPHONE ENCOUNTER
Pt needs Lisinopril 20 mg and Aggrenox 25 mg/200 mg  Pt wants scripts printed because of change in prescription plan

## 2018-10-19 ENCOUNTER — OFFICE VISIT (OUTPATIENT)
Dept: INTERNAL MEDICINE CLINIC | Facility: CLINIC | Age: 53
End: 2018-10-19
Payer: COMMERCIAL

## 2018-10-19 VITALS
WEIGHT: 218 LBS | BODY MASS INDEX: 42.8 KG/M2 | OXYGEN SATURATION: 98 % | HEIGHT: 60 IN | TEMPERATURE: 98.1 F | DIASTOLIC BLOOD PRESSURE: 82 MMHG | RESPIRATION RATE: 18 BRPM | SYSTOLIC BLOOD PRESSURE: 126 MMHG | HEART RATE: 92 BPM

## 2018-10-19 DIAGNOSIS — E11.9 TYPE 2 DIABETES MELLITUS WITHOUT COMPLICATION, WITHOUT LONG-TERM CURRENT USE OF INSULIN (HCC): Primary | ICD-10-CM

## 2018-10-19 DIAGNOSIS — I10 BENIGN ESSENTIAL HYPERTENSION: ICD-10-CM

## 2018-10-19 DIAGNOSIS — M25.50 POLYARTHRALGIA: ICD-10-CM

## 2018-10-19 LAB — SL AMB POCT HEMOGLOBIN AIC: 9

## 2018-10-19 PROCEDURE — 3045F PR MOST RECENT HEMOGLOBIN A1C LEVEL 7.0-9.0%: CPT | Performed by: INTERNAL MEDICINE

## 2018-10-19 PROCEDURE — 83036 HEMOGLOBIN GLYCOSYLATED A1C: CPT | Performed by: INTERNAL MEDICINE

## 2018-10-19 PROCEDURE — 1036F TOBACCO NON-USER: CPT | Performed by: INTERNAL MEDICINE

## 2018-10-19 PROCEDURE — 99213 OFFICE O/P EST LOW 20 MIN: CPT | Performed by: INTERNAL MEDICINE

## 2018-10-19 PROCEDURE — 3008F BODY MASS INDEX DOCD: CPT | Performed by: INTERNAL MEDICINE

## 2018-10-19 NOTE — PROGRESS NOTES
Assessment/Plan:       Diagnoses and all orders for this visit:    Type 2 diabetes mellitus without complication, without long-term current use of insulin ([facility])  -     POCT hemoglobin A1c    Benign essential hypertension    Polyarthralgia        No problem-specific Assessment & Plan notes found for this encounter  We will followup in 4 months    Subjective:      Patient ID: Alexei Mcdermott is a 46 y o  female  The patient was seen and examined  Her HgbA1c was noted to be elevated  She declined changes in her medications stating she can not drive with insulin  She will not increase the Invokana because she states that it paradoxically makes her glucose go up (she will accept the 100 mg dose that she is on now)  The patient notes concerns regarding not being able to see rheumatology until November, but will not accept a small dose of prednisone because it sensitizes her skin to the sun  Her BP is mildly elevated and we will hold the course with that  The patient was told that uncontrolled DM can lead to CAD, CVA, Renal Failure and Blindness (in admission to multiple other complication)  Hypertension   This is a chronic problem  The current episode started more than 1 year ago  The problem is controlled  Pertinent negatives include no chest pain, palpitations or shortness of breath  There are no associated agents to hypertension  Past treatments include ACE inhibitors  The current treatment provides moderate improvement  There are no compliance problems  The following portions of the patient's history were reviewed and updated as appropriate:   She has a past medical history of Bilateral hydronephrosis (1/24/2018); Hypertension; Left thyroid nodule (1/24/2018); Lupus; Lupus anticoagulant positive (1/24/2018); Non Hodgkin's lymphoma ([facility] [address] ) (11/2/2016); and Panic attack  ,   does not have any pertinent problems on file  ,   has no past surgical history on file  ,  family history includes Dementia in her mother; Parkinsonism in her mother  ,   reports that she has never smoked  She has never used smokeless tobacco  She reports that she does not drink alcohol or use drugs  ,  is allergic to clam shell and penicillins     Current Outpatient Prescriptions   Medication Sig Dispense Refill    aspirin-dipyridamole (AGGRENOX)  mg per 12 hr capsule Take 1 capsule by mouth 2 (two) times a day 60 capsule 5    Blood Glucose Monitoring Suppl (FREESTYLE LITE) ASHLEY by Does not apply route 2 (two) times a day FreeStyle Lite Device (Glucometer) 1 each 0    busPIRone (BUSPAR) 15 mg tablet Take 15 mg by mouth TAKE 1/2 TABLET TWICE DAILY       canagliflozin (INVOKANA) 100 mg Take 1 tablet (100 mg total) by mouth daily before breakfast for 180 days 30 tablet 0    colesevelam (WELCHOL) 625 mg tablet Take 1,875 mg by mouth      FREESTYLE TEST STRIPS test strip       gemfibrozil (LOPID) 600 mg tablet 2 (two) times a day        glucose monitoring kit (FREESTYLE) monitoring kit 1 each by Does not apply route 2 (two) times a day 1 each 0    lisinopril (ZESTRIL) 20 mg tablet Take 1 tablet (20 mg total) by mouth daily 30 tablet 5    metFORMIN (GLUCOPHAGE) 1000 MG tablet Take 1,000 mg by mouth Take 1/2 in morning and 1 tablet in the evening        No current facility-administered medications for this visit  Review of Systems   Constitutional: Negative for chills and fatigue  HENT: Negative for facial swelling, rhinorrhea, sinus pain, sinus pressure and sneezing  Respiratory: Negative for cough, choking and shortness of breath  Cardiovascular: Negative for chest pain and palpitations  Gastrointestinal: Negative for abdominal pain, constipation, diarrhea, nausea and vomiting  Genitourinary: Negative  Musculoskeletal: Positive for joint swelling and myalgias  Neurological: Negative  Psychiatric/Behavioral: Negative            Objective:  Vitals:    10/19/18 1037   BP: 126/82   BP Location: Right arm   Patient Position: Sitting   Cuff Size: Adult   Pulse: 92   Resp: 18   Temp: 98 1 °F (36 7 °C)   TempSrc: Tympanic   SpO2: 98%   Weight: 98 9 kg (218 lb)   Height: 5' (1 524 m)     Body mass index is 42 58 kg/m²  Physical Exam   Constitutional: She is oriented to person, place, and time  She appears well-developed and well-nourished  HENT:   Head: Normocephalic and atraumatic  Neck: Normal range of motion  Neck supple  Cardiovascular: Normal rate and regular rhythm  Pulmonary/Chest: Effort normal and breath sounds normal    Abdominal: Soft  Bowel sounds are normal    Musculoskeletal: Normal range of motion  She exhibits no edema or tenderness  Neurological: She is alert and oriented to person, place, and time  Skin: Skin is warm and dry  Psychiatric: She has a normal mood and affect  Nursing note and vitals reviewed

## 2018-10-30 ENCOUNTER — OFFICE VISIT (OUTPATIENT)
Dept: URGENT CARE | Facility: CLINIC | Age: 53
End: 2018-10-30
Payer: COMMERCIAL

## 2018-10-30 VITALS
OXYGEN SATURATION: 96 % | RESPIRATION RATE: 18 BRPM | TEMPERATURE: 98.5 F | HEART RATE: 103 BPM | SYSTOLIC BLOOD PRESSURE: 143 MMHG | DIASTOLIC BLOOD PRESSURE: 93 MMHG

## 2018-10-30 DIAGNOSIS — J06.9 ACUTE URI: Primary | ICD-10-CM

## 2018-10-30 PROCEDURE — S9088 SERVICES PROVIDED IN URGENT: HCPCS | Performed by: NURSE PRACTITIONER

## 2018-10-30 PROCEDURE — 99213 OFFICE O/P EST LOW 20 MIN: CPT | Performed by: NURSE PRACTITIONER

## 2018-10-30 RX ORDER — AZITHROMYCIN 250 MG/1
TABLET, FILM COATED ORAL
Qty: 6 TABLET | Refills: 0 | Status: SHIPPED | OUTPATIENT
Start: 2018-10-30 | End: 2018-11-04

## 2018-10-30 NOTE — PROGRESS NOTES
[facility]        NAME: Mayda Shea is a 46 y o  female  : 1965    MRN: [id number]  DATE: 2018  TIME: 10:45 AM    Assessment and Plan   Acute URI [J06 9]  1  Acute URI  azithromycin (ZITHROMAX) 250 mg tablet         Patient Instructions     Patient Instructions   Discussed viral vs bacterial infection  Zithromax as directed  Continue OTC cough/cold medications as directed- discuss with pharmacist to go over your medications- look for sugar free cough syrup  Call or return for problems/concerns        Follow up with PCP in 3-5 days  Proceed to  ER if symptoms worsen  Chief Complaint     Chief Complaint   Patient presents with    Cough     Pt c/o a cough for a week  History of Present Illness       C/o cough x 1 week, now worse        Review of Systems   Review of Systems   Constitutional: Negative for activity change, chills, fatigue and fever  HENT: Positive for congestion, postnasal drip and rhinorrhea  Negative for ear pain, sinus pressure and sore throat  Eyes: Negative for pain, discharge and redness  Respiratory: Positive for cough  Negative for wheezing  Cardiovascular: Negative for chest pain  Gastrointestinal: Negative for constipation, diarrhea, nausea and vomiting  Musculoskeletal: Negative for myalgias  Skin: Negative for rash  Neurological: Negative for dizziness and headaches           Current Medications       Current Outpatient Prescriptions:     aspirin-dipyridamole (AGGRENOX)  mg per 12 hr capsule, Take 1 capsule by mouth 2 (two) times a day, Disp: 60 capsule, Rfl: 5    azithromycin (ZITHROMAX) 250 mg tablet, 2 tablets day 1, then 1 tablet days 2-5, Disp: 6 tablet, Rfl: 0    Blood Glucose Monitoring Suppl (FREESTYLE LITE) ASHLEY, by Does not apply route 2 (two) times a day FreeStyle Lite Device (Glucometer), Disp: 1 each, Rfl: 0    busPIRone (BUSPAR) 15 mg tablet, Take 15 mg by mouth TAKE 1/2 TABLET TWICE DAILY , Disp: , Rfl:    canagliflozin (INVOKANA) 100 mg, Take 1 tablet (100 mg total) by mouth daily before breakfast for 180 days, Disp: 30 tablet, Rfl: 0    colesevelam (WELCHOL) 625 mg tablet, Take 1,875 mg by mouth, Disp: , Rfl:     FREESTYLE TEST STRIPS test strip, , Disp: , Rfl:     gemfibrozil (LOPID) 600 mg tablet, 2 (two) times a day  , Disp: , Rfl:     glucose monitoring kit (FREESTYLE) monitoring kit, 1 each by Does not apply route 2 (two) times a day, Disp: 1 each, Rfl: 0    lisinopril (ZESTRIL) 20 mg tablet, Take 1 tablet (20 mg total) by mouth daily, Disp: 30 tablet, Rfl: 5    metFORMIN (GLUCOPHAGE) 1000 MG tablet, Take 1,000 mg by mouth Take 1/2 in morning and 1 tablet in the evening , Disp: , Rfl:     Current Allergies     Allergies as of 10/30/2018 - Reviewed 10/19/2018   Allergen Reaction Noted    Clam shell  04/19/2018    Penicillins Rash 11/02/2016            The following portions of the patient's history were reviewed and updated as appropriate: allergies, current medications, past family history, past medical history, past social history, past surgical history and problem list      Past Medical History:   Diagnosis Date    Bilateral hydronephrosis 1/24/2018    Hypertension     Left thyroid nodule 1/24/2018    Lupus     Lupus anticoagulant positive 1/24/2018    Non Hodgkin's lymphoma ([facility] [address] ) 11/2/2016    Panic attack        No past surgical history on file  Family History   Problem Relation Age of Onset    Dementia Mother     Parkinsonism Mother          Medications have been verified  Objective   /93   Pulse 103   Temp 98 5 °F (36 9 °C)   Resp 18   SpO2 96%        Physical Exam     Physical Exam   Constitutional: She is oriented to person, place, and time  She appears well-developed and well-nourished  No distress  HENT:   Head: Normocephalic and atraumatic     Right Ear: Tympanic membrane, external ear and ear canal normal    Left Ear: Tympanic membrane, external ear and ear canal normal    Nose: Nose normal    Mouth/Throat: Uvula is midline, oropharynx is clear and moist and mucous membranes are normal  No oropharyngeal exudate  Eyes: Conjunctivae and EOM are normal  Right eye exhibits no discharge  Left eye exhibits no discharge  Neck: Normal range of motion  Neck supple  No thyromegaly present  Cardiovascular: Normal rate, regular rhythm and normal heart sounds  Exam reveals no gallop and no friction rub  No murmur heard  Pulmonary/Chest: Effort normal  No respiratory distress  She has wheezes (cleared with cough) in the right upper field  She has no rales  Musculoskeletal: Normal range of motion  Lymphadenopathy:     She has no cervical adenopathy  Neurological: She is alert and oriented to person, place, and time  No cranial nerve deficit  Coordination normal    Skin: Skin is warm and dry  She is not diaphoretic  Psychiatric: She has a normal mood and affect  Her behavior is normal  Judgment and thought content normal    Nursing note and vitals reviewed

## 2018-10-30 NOTE — PATIENT INSTRUCTIONS
Discussed viral vs bacterial infection  Zithromax as directed  Continue OTC cough/cold medications as directed- discuss with pharmacist to go over your medications- look for sugar free cough syrup  Call or return for problems/concerns

## 2018-12-26 ENCOUNTER — TRANSCRIBE ORDERS (OUTPATIENT)
Dept: LAB | Facility: CLINIC | Age: 53
End: 2018-12-26

## 2018-12-26 ENCOUNTER — APPOINTMENT (OUTPATIENT)
Dept: LAB | Facility: CLINIC | Age: 53
End: 2018-12-26
Payer: COMMERCIAL

## 2018-12-26 ENCOUNTER — APPOINTMENT (OUTPATIENT)
Dept: RADIOLOGY | Facility: CLINIC | Age: 53
End: 2018-12-26
Payer: COMMERCIAL

## 2018-12-26 DIAGNOSIS — M19.90 OSTEOARTHRITIS, UNSPECIFIED OSTEOARTHRITIS TYPE, UNSPECIFIED SITE: ICD-10-CM

## 2018-12-26 DIAGNOSIS — M17.0 PRIMARY OSTEOARTHRITIS OF BOTH KNEES: ICD-10-CM

## 2018-12-26 DIAGNOSIS — E11.42 DIABETIC POLYNEUROPATHY ASSOCIATED WITH TYPE 2 DIABETES MELLITUS (HCC): ICD-10-CM

## 2018-12-26 DIAGNOSIS — M15.0 PRIMARY GENERALIZED HYPERTROPHIC OSTEOARTHROSIS: Primary | ICD-10-CM

## 2018-12-26 DIAGNOSIS — Z79.899 ENCOUNTER FOR LONG-TERM (CURRENT) USE OF MEDICATIONS: ICD-10-CM

## 2018-12-26 DIAGNOSIS — I10 HYPERTENSION, UNSPECIFIED TYPE: ICD-10-CM

## 2018-12-26 DIAGNOSIS — M15.0 PRIMARY GENERALIZED HYPERTROPHIC OSTEOARTHROSIS: ICD-10-CM

## 2018-12-26 LAB
25(OH)D3 SERPL-MCNC: 37.6 NG/ML (ref 30–100)
ALBUMIN SERPL BCP-MCNC: 3.9 G/DL (ref 3.5–5)
ALP SERPL-CCNC: 143 U/L (ref 46–116)
ALT SERPL W P-5'-P-CCNC: 55 U/L (ref 12–78)
ANION GAP SERPL CALCULATED.3IONS-SCNC: 10 MMOL/L (ref 4–13)
AST SERPL W P-5'-P-CCNC: 47 U/L (ref 5–45)
BACTERIA UR QL AUTO: NORMAL /HPF
BASOPHILS # BLD AUTO: 0.07 THOUSANDS/ΜL (ref 0–0.1)
BASOPHILS NFR BLD AUTO: 1 % (ref 0–1)
BILIRUB SERPL-MCNC: 0.31 MG/DL (ref 0.2–1)
BILIRUB UR QL STRIP: NEGATIVE
BUN SERPL-MCNC: 22 MG/DL (ref 5–25)
C3 SERPL-MCNC: 204 MG/DL (ref 90–180)
C4 SERPL-MCNC: 31 MG/DL (ref 10–40)
CALCIUM SERPL-MCNC: 9.3 MG/DL (ref 8.3–10.1)
CHLORIDE SERPL-SCNC: 101 MMOL/L (ref 100–108)
CK SERPL-CCNC: 55 U/L (ref 26–192)
CLARITY UR: CLEAR
CO2 SERPL-SCNC: 21 MMOL/L (ref 21–32)
COLOR UR: YELLOW
CREAT SERPL-MCNC: 0.92 MG/DL (ref 0.6–1.3)
CRP SERPL QL: <3 MG/L
EOSINOPHIL # BLD AUTO: 0.34 THOUSAND/ΜL (ref 0–0.61)
EOSINOPHIL NFR BLD AUTO: 4 % (ref 0–6)
ERYTHROCYTE [DISTWIDTH] IN BLOOD BY AUTOMATED COUNT: 13.6 % (ref 11.6–15.1)
ERYTHROCYTE [SEDIMENTATION RATE] IN BLOOD: 34 MM/HOUR (ref 0–20)
GFR SERPL CREATININE-BSD FRML MDRD: 71 ML/MIN/1.73SQ M
GLUCOSE P FAST SERPL-MCNC: 186 MG/DL (ref 65–99)
GLUCOSE UR STRIP-MCNC: ABNORMAL MG/DL
HCT VFR BLD AUTO: 42.4 % (ref 34.8–46.1)
HGB BLD-MCNC: 13.8 G/DL (ref 11.5–15.4)
HGB UR QL STRIP.AUTO: NEGATIVE
HYALINE CASTS #/AREA URNS LPF: NORMAL /LPF
IMM GRANULOCYTES # BLD AUTO: 0.07 THOUSAND/UL (ref 0–0.2)
IMM GRANULOCYTES NFR BLD AUTO: 1 % (ref 0–2)
KETONES UR STRIP-MCNC: NEGATIVE MG/DL
LEUKOCYTE ESTERASE UR QL STRIP: ABNORMAL
LYMPHOCYTES # BLD AUTO: 2.15 THOUSANDS/ΜL (ref 0.6–4.47)
LYMPHOCYTES NFR BLD AUTO: 27 % (ref 14–44)
MCH RBC QN AUTO: 30.3 PG (ref 26.8–34.3)
MCHC RBC AUTO-ENTMCNC: 32.5 G/DL (ref 31.4–37.4)
MCV RBC AUTO: 93 FL (ref 82–98)
MONOCYTES # BLD AUTO: 0.39 THOUSAND/ΜL (ref 0.17–1.22)
MONOCYTES NFR BLD AUTO: 5 % (ref 4–12)
NEUTROPHILS # BLD AUTO: 4.88 THOUSANDS/ΜL (ref 1.85–7.62)
NEUTS SEG NFR BLD AUTO: 62 % (ref 43–75)
NITRITE UR QL STRIP: NEGATIVE
NON-SQ EPI CELLS URNS QL MICRO: NORMAL /HPF
NRBC BLD AUTO-RTO: 0 /100 WBCS
PH UR STRIP.AUTO: 5.5 [PH] (ref 4.5–8)
PLATELET # BLD AUTO: 408 THOUSANDS/UL (ref 149–390)
PMV BLD AUTO: 9.8 FL (ref 8.9–12.7)
POTASSIUM SERPL-SCNC: 4.6 MMOL/L (ref 3.5–5.3)
PROT SERPL-MCNC: 8 G/DL (ref 6.4–8.2)
PROT UR STRIP-MCNC: NEGATIVE MG/DL
RBC # BLD AUTO: 4.56 MILLION/UL (ref 3.81–5.12)
RBC #/AREA URNS AUTO: NORMAL /HPF
SODIUM SERPL-SCNC: 132 MMOL/L (ref 136–145)
SP GR UR STRIP.AUTO: 1.03 (ref 1–1.03)
TSH SERPL DL<=0.05 MIU/L-ACNC: 1.18 UIU/ML (ref 0.36–3.74)
URATE SERPL-MCNC: 5.9 MG/DL (ref 2–6.8)
UROBILINOGEN UR QL STRIP.AUTO: 0.2 E.U./DL
WBC # BLD AUTO: 7.9 THOUSAND/UL (ref 4.31–10.16)
WBC #/AREA URNS AUTO: NORMAL /HPF

## 2018-12-26 PROCEDURE — 85306 CLOT INHIBIT PROT S FREE: CPT

## 2018-12-26 PROCEDURE — 87476 LYME DIS DNA AMP PROBE: CPT

## 2018-12-26 PROCEDURE — 86235 NUCLEAR ANTIGEN ANTIBODY: CPT

## 2018-12-26 PROCEDURE — 86147 CARDIOLIPIN ANTIBODY EA IG: CPT

## 2018-12-26 PROCEDURE — 81241 F5 GENE: CPT

## 2018-12-26 PROCEDURE — 85613 RUSSELL VIPER VENOM DILUTED: CPT

## 2018-12-26 PROCEDURE — 86200 CCP ANTIBODY: CPT

## 2018-12-26 PROCEDURE — 84443 ASSAY THYROID STIM HORMONE: CPT

## 2018-12-26 PROCEDURE — 86800 THYROGLOBULIN ANTIBODY: CPT

## 2018-12-26 PROCEDURE — 86160 COMPLEMENT ANTIGEN: CPT

## 2018-12-26 PROCEDURE — 86225 DNA ANTIBODY NATIVE: CPT

## 2018-12-26 PROCEDURE — 73130 X-RAY EXAM OF HAND: CPT

## 2018-12-26 PROCEDURE — 82550 ASSAY OF CK (CPK): CPT

## 2018-12-26 PROCEDURE — 86376 MICROSOMAL ANTIBODY EACH: CPT

## 2018-12-26 PROCEDURE — 73562 X-RAY EXAM OF KNEE 3: CPT

## 2018-12-26 PROCEDURE — 85652 RBC SED RATE AUTOMATED: CPT

## 2018-12-26 PROCEDURE — 81001 URINALYSIS AUTO W/SCOPE: CPT | Performed by: INTERNAL MEDICINE

## 2018-12-26 PROCEDURE — 86140 C-REACTIVE PROTEIN: CPT

## 2018-12-26 PROCEDURE — 85705 THROMBOPLASTIN INHIBITION: CPT

## 2018-12-26 PROCEDURE — 86146 BETA-2 GLYCOPROTEIN ANTIBODY: CPT

## 2018-12-26 PROCEDURE — 84550 ASSAY OF BLOOD/URIC ACID: CPT

## 2018-12-26 PROCEDURE — 80074 ACUTE HEPATITIS PANEL: CPT

## 2018-12-26 PROCEDURE — 86430 RHEUMATOID FACTOR TEST QUAL: CPT

## 2018-12-26 PROCEDURE — 85303 CLOT INHIBIT PROT C ACTIVITY: CPT

## 2018-12-26 PROCEDURE — 82306 VITAMIN D 25 HYDROXY: CPT

## 2018-12-26 PROCEDURE — 80053 COMPREHEN METABOLIC PANEL: CPT

## 2018-12-26 PROCEDURE — 85732 THROMBOPLASTIN TIME PARTIAL: CPT

## 2018-12-26 PROCEDURE — 86038 ANTINUCLEAR ANTIBODIES: CPT

## 2018-12-26 PROCEDURE — 85670 THROMBIN TIME PLASMA: CPT

## 2018-12-26 PROCEDURE — 85025 COMPLETE CBC W/AUTO DIFF WBC: CPT

## 2018-12-26 PROCEDURE — 84432 ASSAY OF THYROGLOBULIN: CPT

## 2018-12-26 PROCEDURE — 36415 COLL VENOUS BLD VENIPUNCTURE: CPT

## 2018-12-27 LAB
ACTIN IGG SERPL-ACNC: 3 UNITS (ref 0–19)
APTT SCREEN TO CONFIRM RATIO: 1.11 RATIO (ref 0–1.4)
CARDIOLIPIN IGA SER IA-ACNC: <9 APL U/ML (ref 0–11)
CARDIOLIPIN IGG SER IA-ACNC: <9 GPL U/ML (ref 0–14)
CARDIOLIPIN IGM SER IA-ACNC: <9 MPL U/ML (ref 0–12)
CCP IGA+IGG SERPL IA-ACNC: 5 UNITS (ref 0–19)
CENTROMERE B AB SER-ACNC: <0.2 AI (ref 0–0.9)
CONFIRM APTT/NORMAL: 37.5 SEC (ref 0–55)
DSDNA AB SER-ACNC: 1 IU/ML (ref 0–9)
ENA SCL70 AB SER-ACNC: <0.2 AI (ref 0–0.9)
ENA SS-A AB SER-ACNC: <0.2 AI (ref 0–0.9)
ENA SS-B AB SER-ACNC: <0.2 AI (ref 0–0.9)
HAV IGM SER QL: NORMAL
HBV CORE IGM SER QL: NORMAL
HBV SURFACE AG SER QL: NORMAL
HCV AB SER QL: NORMAL
LA PPP-IMP: NORMAL
PROT C AG ACT/NOR PPP IA: >150 % OF NORMAL (ref 60–150)
PROT S ACT/NOR PPP: 160 % (ref 63–140)
RHEUMATOID FACT SER QL LA: NEGATIVE
RYE IGE QN: NEGATIVE
SCREEN APTT: 29.3 SEC (ref 0–51.9)
SCREEN DRVVT: 39.9 SEC (ref 0–47)
THROMBIN TIME: 15.6 SEC (ref 0–23)
THYROGLOB AB SERPL-ACNC: <1 IU/ML (ref 0–0.9)
THYROGLOB SERPL-MCNC: 32.1 NG/ML (ref 1.5–38.5)
THYROPEROXIDASE AB SERPL-ACNC: 11 IU/ML (ref 0–34)

## 2018-12-28 LAB
B2 GLYCOPROT1 IGA SER-ACNC: <9 GPI IGA UNITS (ref 0–25)
B2 GLYCOPROT1 IGG SER-ACNC: <9 GPI IGG UNITS (ref 0–20)
B2 GLYCOPROT1 IGM SER-ACNC: <9 GPI IGM UNITS (ref 0–32)

## 2018-12-31 LAB — F5 GENE MUT ANL BLD/T: NORMAL

## 2019-01-03 LAB — B BURGDOR DNA SPEC QL NAA+PROBE: NEGATIVE

## 2019-01-12 ENCOUNTER — APPOINTMENT (OUTPATIENT)
Dept: LAB | Facility: HOSPITAL | Age: 54
End: 2019-01-12
Payer: COMMERCIAL

## 2019-01-12 ENCOUNTER — TRANSCRIBE ORDERS (OUTPATIENT)
Dept: ADMINISTRATIVE | Facility: HOSPITAL | Age: 54
End: 2019-01-12

## 2019-01-12 DIAGNOSIS — R94.5 NONSPECIFIC ABNORMAL RESULTS OF LIVER FUNCTION STUDY: Primary | ICD-10-CM

## 2019-01-12 DIAGNOSIS — R94.5 NONSPECIFIC ABNORMAL RESULTS OF LIVER FUNCTION STUDY: ICD-10-CM

## 2019-01-12 DIAGNOSIS — C83.30 LYMPHOMA, LARGE-CELL, DIFFUSE (HCC): Primary | ICD-10-CM

## 2019-01-12 DIAGNOSIS — C83.30 LYMPHOMA, LARGE-CELL, DIFFUSE (HCC): ICD-10-CM

## 2019-01-12 LAB
ALBUMIN SERPL BCP-MCNC: 4.5 G/DL (ref 3.5–5.7)
ALP SERPL-CCNC: 116 U/L (ref 40–150)
ALT SERPL W P-5'-P-CCNC: 38 U/L (ref 7–52)
ANION GAP SERPL CALCULATED.3IONS-SCNC: 10 MMOL/L (ref 4–13)
AST SERPL W P-5'-P-CCNC: 27 U/L (ref 13–39)
BASOPHILS # BLD AUTO: 0.1 THOUSANDS/ΜL (ref 0–0.1)
BASOPHILS NFR BLD AUTO: 1 % (ref 0–2)
BILIRUB SERPL-MCNC: 0.3 MG/DL (ref 0.2–1)
BUN SERPL-MCNC: 19 MG/DL (ref 7–25)
CALCIUM SERPL-MCNC: 10 MG/DL (ref 8.6–10.5)
CHLORIDE SERPL-SCNC: 102 MMOL/L (ref 98–107)
CO2 SERPL-SCNC: 22 MMOL/L (ref 21–31)
CREAT SERPL-MCNC: 0.76 MG/DL (ref 0.6–1.2)
EOSINOPHIL # BLD AUTO: 0.6 THOUSAND/ΜL (ref 0–0.61)
EOSINOPHIL NFR BLD AUTO: 8 % (ref 0–5)
ERYTHROCYTE [DISTWIDTH] IN BLOOD BY AUTOMATED COUNT: 13.7 % (ref 11.5–14.5)
GFR SERPL CREATININE-BSD FRML MDRD: 89 ML/MIN/1.73SQ M
GLUCOSE P FAST SERPL-MCNC: 225 MG/DL (ref 65–99)
HCT VFR BLD AUTO: 43.4 % (ref 34.8–46.1)
HGB BLD-MCNC: 14.3 G/DL (ref 12–16)
LYMPHOCYTES # BLD AUTO: 2 THOUSANDS/ΜL (ref 0.6–4.47)
LYMPHOCYTES NFR BLD AUTO: 27 % (ref 21–51)
MCH RBC QN AUTO: 30.8 PG (ref 26–34)
MCHC RBC AUTO-ENTMCNC: 32.9 G/DL (ref 31–37)
MCV RBC AUTO: 94 FL (ref 81–99)
MONOCYTES # BLD AUTO: 0.3 THOUSAND/ΜL (ref 0.17–1.22)
MONOCYTES NFR BLD AUTO: 4 % (ref 2–12)
NEUTROPHILS # BLD AUTO: 4.3 THOUSANDS/ΜL (ref 1.4–6.5)
NEUTS SEG NFR BLD AUTO: 60 % (ref 42–75)
NRBC BLD AUTO-RTO: 0 /100 WBCS
PLATELET # BLD AUTO: 381 THOUSANDS/UL (ref 149–390)
PMV BLD AUTO: 8.6 FL (ref 8.6–11.7)
POTASSIUM SERPL-SCNC: 4.3 MMOL/L (ref 3.5–5.5)
PROT SERPL-MCNC: 7.4 G/DL (ref 6.4–8.9)
RBC # BLD AUTO: 4.64 MILLION/UL (ref 3.9–5.2)
SODIUM SERPL-SCNC: 134 MMOL/L (ref 134–143)
WBC # BLD AUTO: 7.2 THOUSAND/UL (ref 4.8–10.8)

## 2019-01-12 PROCEDURE — 36415 COLL VENOUS BLD VENIPUNCTURE: CPT

## 2019-01-12 PROCEDURE — 80053 COMPREHEN METABOLIC PANEL: CPT

## 2019-01-12 PROCEDURE — 85025 COMPLETE CBC W/AUTO DIFF WBC: CPT

## 2019-03-21 DIAGNOSIS — E11.9 TYPE 2 DIABETES MELLITUS WITHOUT COMPLICATION, WITHOUT LONG-TERM CURRENT USE OF INSULIN (HCC): Primary | ICD-10-CM

## 2019-04-22 ENCOUNTER — OFFICE VISIT (OUTPATIENT)
Dept: INTERNAL MEDICINE CLINIC | Facility: CLINIC | Age: 54
End: 2019-04-22
Payer: COMMERCIAL

## 2019-04-22 VITALS
BODY MASS INDEX: 42.41 KG/M2 | SYSTOLIC BLOOD PRESSURE: 138 MMHG | TEMPERATURE: 99.6 F | HEIGHT: 60 IN | WEIGHT: 216 LBS | DIASTOLIC BLOOD PRESSURE: 88 MMHG | HEART RATE: 95 BPM | OXYGEN SATURATION: 97 % | RESPIRATION RATE: 16 BRPM

## 2019-04-22 DIAGNOSIS — E78.2 MIXED HYPERLIPIDEMIA: ICD-10-CM

## 2019-04-22 DIAGNOSIS — Z12.31 ENCOUNTER FOR SCREENING MAMMOGRAM FOR MALIGNANT NEOPLASM OF BREAST: ICD-10-CM

## 2019-04-22 DIAGNOSIS — E11.9 TYPE 2 DIABETES MELLITUS WITHOUT COMPLICATION, WITHOUT LONG-TERM CURRENT USE OF INSULIN (HCC): Primary | ICD-10-CM

## 2019-04-22 DIAGNOSIS — J30.2 SEASONAL ALLERGIC RHINITIS, UNSPECIFIED TRIGGER: ICD-10-CM

## 2019-04-22 LAB — SL AMB POCT HEMOGLOBIN AIC: 12.2 (ref ?–6.5)

## 2019-04-22 PROCEDURE — 99214 OFFICE O/P EST MOD 30 MIN: CPT | Performed by: INTERNAL MEDICINE

## 2019-04-22 PROCEDURE — 1036F TOBACCO NON-USER: CPT | Performed by: INTERNAL MEDICINE

## 2019-04-22 PROCEDURE — 83036 HEMOGLOBIN GLYCOSYLATED A1C: CPT | Performed by: INTERNAL MEDICINE

## 2019-04-22 PROCEDURE — 3046F HEMOGLOBIN A1C LEVEL >9.0%: CPT | Performed by: INTERNAL MEDICINE

## 2019-04-22 PROCEDURE — 3008F BODY MASS INDEX DOCD: CPT | Performed by: INTERNAL MEDICINE

## 2019-04-22 RX ORDER — COLESEVELAM 180 1/1
TABLET ORAL
Qty: 180 TABLET | Refills: 5 | Status: SHIPPED | OUTPATIENT
Start: 2019-04-22 | End: 2019-10-18 | Stop reason: SDUPTHER

## 2019-04-22 RX ORDER — GEMFIBROZIL 600 MG/1
600 TABLET, FILM COATED ORAL 2 TIMES DAILY
Qty: 60 TABLET | Refills: 5 | Status: SHIPPED | OUTPATIENT
Start: 2019-04-22 | End: 2019-10-17 | Stop reason: SDUPTHER

## 2019-05-22 DIAGNOSIS — E11.9 TYPE 2 DIABETES MELLITUS WITHOUT COMPLICATION, WITHOUT LONG-TERM CURRENT USE OF INSULIN (HCC): ICD-10-CM

## 2019-08-05 ENCOUNTER — LAB (OUTPATIENT)
Dept: LAB | Facility: HOSPITAL | Age: 54
DRG: 638 | End: 2019-08-05
Payer: COMMERCIAL

## 2019-08-05 DIAGNOSIS — J30.2 SEASONAL ALLERGIC RHINITIS, UNSPECIFIED TRIGGER: ICD-10-CM

## 2019-08-05 LAB
CHOLEST SERPL-MCNC: 263 MG/DL (ref 0–200)
CREAT UR-MCNC: 50.3 MG/DL
HDLC SERPL-MCNC: 47 MG/DL (ref 40–60)
LDLC SERPL CALC-MCNC: 172 MG/DL (ref 0–100)
MICROALBUMIN UR-MCNC: 40.3 MG/L (ref 0–20)
MICROALBUMIN/CREAT 24H UR: 80 MG/G CREATININE (ref 0–30)
TRIGL SERPL-MCNC: 222 MG/DL (ref 44–166)

## 2019-08-05 PROCEDURE — 80061 LIPID PANEL: CPT | Performed by: INTERNAL MEDICINE

## 2019-08-05 PROCEDURE — 82570 ASSAY OF URINE CREATININE: CPT | Performed by: INTERNAL MEDICINE

## 2019-08-05 PROCEDURE — 86003 ALLG SPEC IGE CRUDE XTRC EA: CPT

## 2019-08-05 PROCEDURE — 3060F POS MICROALBUMINURIA REV: CPT | Performed by: INTERNAL MEDICINE

## 2019-08-05 PROCEDURE — 36415 COLL VENOUS BLD VENIPUNCTURE: CPT | Performed by: INTERNAL MEDICINE

## 2019-08-05 PROCEDURE — 82785 ASSAY OF IGE: CPT

## 2019-08-05 PROCEDURE — 82043 UR ALBUMIN QUANTITATIVE: CPT | Performed by: INTERNAL MEDICINE

## 2019-08-07 LAB
A ALTERNATA IGE QN: 0.33 KUA/I
A FUMIGATUS IGE QN: <0.1 KUA/I
ALLERGEN COMMENT: ABNORMAL
ALLERGEN COMMENT: ABNORMAL
BERMUDA GRASS IGE QN: 0.29 KUA/I
BOXELDER IGE QN: 0.29 KUA/I
C HERBARUM IGE QN: <0.1 KUA/I
CAT DANDER IGE QN: <0.1 KUA/I
CLAM IGE QN: 0.29 KUA/L
CMN PIGWEED IGE QN: 0.14 KUA/I
COMMON RAGWEED IGE QN: 0.26 KUA/I
COTTONWOOD IGE QN: 0.31 KUA/I
CRAB IGE QN: <0.1 KUA/L
D FARINAE IGE QN: <0.1 KUA/I
D PTERONYSS IGE QN: <0.1 KUA/I
DOG DANDER IGE QN: <0.1 KUA/I
LOBSTER IGE QN: <0.1 KUA/L
LONDON PLANE IGE QN: 0.4 KUA/I
MOUSE URINE PROT IGE QN: <0.1 KUA/I
MT JUNIPER IGE QN: 0.22 KUA/I
MUGWORT IGE QN: 0.15 KUA/I
OYSTER IGE QN: <0.1 KUA/L
P NOTATUM IGE QN: <0.1 KUA/I
ROACH IGE QN: <0.1 KUA/I
SCALLOP IGE QN: <0.1 KUA/L
SHEEP SORREL IGE QN: 0.26 KUA/I
SHRIMP IGE QN: <0.1 KUA/L
SILVER BIRCH IGE QN: 0.11 KUA/I
TIMOTHY IGE QN: 0.28 KUA/I
TOTAL IGE SMQN RAST: 65.7 KU/L (ref 0–113)
TOTAL IGE SMQN RAST: 65.7 KU/L (ref 0–113)
WALNUT IGE QN: 0.22 KUA/I
WHITE ASH IGE QN: 0.28 KUA/I
WHITE ELM IGE QN: 0.29 KUA/I
WHITE MULBERRY IGE QN: 0.11 KUA/I
WHITE OAK IGE QN: 0.28 KUA/I

## 2019-08-08 ENCOUNTER — TELEPHONE (OUTPATIENT)
Dept: OTHER | Facility: OTHER | Age: 54
End: 2019-08-08

## 2019-08-08 ENCOUNTER — HOSPITAL ENCOUNTER (INPATIENT)
Facility: HOSPITAL | Age: 54
LOS: 2 days | Discharge: HOME | DRG: 638 | End: 2019-08-10
Attending: EMERGENCY MEDICINE | Admitting: INTERNAL MEDICINE
Payer: COMMERCIAL

## 2019-08-08 ENCOUNTER — APPOINTMENT (INPATIENT)
Dept: RADIOLOGY | Facility: HOSPITAL | Age: 54
DRG: 638 | End: 2019-08-08
Payer: COMMERCIAL

## 2019-08-08 ENCOUNTER — APPOINTMENT (EMERGENCY)
Dept: RADIOLOGY | Facility: HOSPITAL | Age: 54
DRG: 638 | End: 2019-08-08
Payer: COMMERCIAL

## 2019-08-08 DIAGNOSIS — T78.40XA ALLERGIC REACTION, INITIAL ENCOUNTER: ICD-10-CM

## 2019-08-08 DIAGNOSIS — I48.91 RAPID ATRIAL FIBRILLATION (HCC): ICD-10-CM

## 2019-08-08 DIAGNOSIS — E11.10 DKA (DIABETIC KETOACIDOSES): Primary | ICD-10-CM

## 2019-08-08 DIAGNOSIS — E11.9 TYPE 2 DIABETES MELLITUS WITHOUT COMPLICATION, WITHOUT LONG-TERM CURRENT USE OF INSULIN (HCC): ICD-10-CM

## 2019-08-08 LAB
A ALTERNATA IGE QN: 0.37 KU/L
A FUMIGATUS IGE QN: <0.1 KU/L
A PULLULANS IGE QN: <0.1 KU/L
ALBUMIN SERPL BCP-MCNC: 4.3 G/DL (ref 3.5–5.7)
ALP SERPL-CCNC: 129 U/L (ref 40–150)
ALT SERPL W P-5'-P-CCNC: 28 U/L (ref 7–52)
ANION GAP SERPL CALCULATED.3IONS-SCNC: 11 MMOL/L (ref 4–13)
ANION GAP SERPL CALCULATED.3IONS-SCNC: 22 MMOL/L (ref 4–13)
ANION GAP SERPL CALCULATED.3IONS-SCNC: 9 MMOL/L (ref 4–13)
APTT PPP: 34 SECONDS (ref 23–37)
ARTERIAL PATENCY WRIST A: YES
AST SERPL W P-5'-P-CCNC: 22 U/L (ref 13–39)
BASE EXCESS BLDA CALC-SCNC: -12.9 MMOL/L (ref -2–3)
BASOPHILS # BLD AUTO: 0.2 THOUSANDS/ΜL (ref 0–0.1)
BASOPHILS NFR BLD AUTO: 1 % (ref 0–2)
BETA-HYDROXYBUTYRATE: 2.3 MMOL/L
BILIRUB SERPL-MCNC: 0.7 MG/DL (ref 0.2–1)
BNP SERPL-MCNC: 19 PG/ML (ref 1–100)
BUN SERPL-MCNC: 21 MG/DL (ref 7–25)
BUN SERPL-MCNC: 22 MG/DL (ref 7–25)
BUN SERPL-MCNC: 25 MG/DL (ref 7–25)
C ALBICANS IGE QN: <0.1 KU/L
C HERBARUM IGE QN: <0.1 KU/L
CALCIUM SERPL-MCNC: 7.2 MG/DL (ref 8.6–10.5)
CALCIUM SERPL-MCNC: 7.6 MG/DL (ref 8.6–10.5)
CALCIUM SERPL-MCNC: 9.4 MG/DL (ref 8.6–10.5)
CHLORIDE SERPL-SCNC: 102 MMOL/L (ref 98–107)
CHLORIDE SERPL-SCNC: 89 MMOL/L (ref 98–107)
CHLORIDE SERPL-SCNC: 99 MMOL/L (ref 98–107)
CO2 SERPL-SCNC: 17 MMOL/L (ref 21–31)
CO2 SERPL-SCNC: 19 MMOL/L (ref 21–31)
CO2 SERPL-SCNC: 21 MMOL/L (ref 21–31)
CREAT SERPL-MCNC: 0.83 MG/DL (ref 0.6–1.2)
CREAT SERPL-MCNC: 0.91 MG/DL (ref 0.6–1.2)
CREAT SERPL-MCNC: 1.26 MG/DL (ref 0.6–1.2)
E PURPURASCENS IGE QN: <0.1 KU/L
EOSINOPHIL # BLD AUTO: 0 THOUSAND/ΜL (ref 0–0.61)
EOSINOPHIL NFR BLD AUTO: 0 % (ref 0–5)
ERYTHROCYTE [DISTWIDTH] IN BLOOD BY AUTOMATED COUNT: 14 % (ref 11.5–14.5)
FUSARIUM PROLIFERATUM: <0.1 KU/L
GFR SERPL CREATININE-BSD FRML MDRD: 48 ML/MIN/1.73SQ M
GFR SERPL CREATININE-BSD FRML MDRD: 71 ML/MIN/1.73SQ M
GFR SERPL CREATININE-BSD FRML MDRD: 80 ML/MIN/1.73SQ M
GIANT PLATELETS BLD QL SMEAR: PRESENT
GLUCOSE SERPL-MCNC: 133 MG/DL (ref 65–140)
GLUCOSE SERPL-MCNC: 174 MG/DL (ref 65–140)
GLUCOSE SERPL-MCNC: 239 MG/DL (ref 65–140)
GLUCOSE SERPL-MCNC: 273 MG/DL (ref 65–99)
GLUCOSE SERPL-MCNC: 316 MG/DL (ref 65–140)
GLUCOSE SERPL-MCNC: 322 MG/DL (ref 65–140)
GLUCOSE SERPL-MCNC: 347 MG/DL (ref 65–140)
GLUCOSE SERPL-MCNC: 354 MG/DL (ref 65–99)
GLUCOSE SERPL-MCNC: 355 MG/DL (ref 65–140)
GLUCOSE SERPL-MCNC: 396 MG/DL (ref 65–140)
GLUCOSE SERPL-MCNC: 453 MG/DL (ref 65–140)
GLUCOSE SERPL-MCNC: >500 MG/DL (ref 65–140)
GLUCOSE SERPL-MCNC: >700 MG/DL (ref 65–99)
HCO3 BLDA-SCNC: 12.3 MMOL/L (ref 22–28)
HCT VFR BLD AUTO: 57.5 % (ref 42–47)
HGB BLD-MCNC: 18.6 G/DL (ref 12–16)
INR PPP: 0.93 (ref 0.9–1.5)
LACTATE SERPL-SCNC: 1.9 MMOL/L (ref 0.5–2)
LACTATE SERPL-SCNC: 3.3 MMOL/L (ref 0.5–2)
LACTATE SERPL-SCNC: 4.9 MMOL/L (ref 0.5–2)
LYMPHOCYTES # BLD AUTO: 1.7 THOUSANDS/ΜL (ref 0.6–4.47)
LYMPHOCYTES NFR BLD AUTO: 9 % (ref 21–51)
Lab: ABNORMAL
M RACEMOSUS IGE QN: <0.1 KU/L
MAGNESIUM SERPL-MCNC: 1.5 MG/DL (ref 1.9–2.7)
MAGNESIUM SERPL-MCNC: 1.8 MG/DL (ref 1.9–2.7)
MCH RBC QN AUTO: 31 PG (ref 26–34)
MCHC RBC AUTO-ENTMCNC: 32.4 G/DL (ref 31–37)
MCV RBC AUTO: 96 FL (ref 81–99)
MONOCYTES # BLD AUTO: 0.3 THOUSAND/ΜL (ref 0.17–1.22)
MONOCYTES NFR BLD AUTO: 2 % (ref 2–12)
NASAL CANNULA: 5
NEUTROPHILS # BLD AUTO: 17.3 THOUSANDS/ΜL (ref 1.4–6.5)
NEUTS SEG NFR BLD AUTO: 88 % (ref 42–75)
O2 CT BLDA-SCNC: 21.2 ML/DL
OXYHGB MFR BLDA: 97.2 % (ref 94–100)
PCO2 BLDA: 26.9 MM HG (ref 35–45)
PENICILLIUM CHRYSOGENUM: <0.1 KU/L
PH BLDA: 7.28 [PH] (ref 7.35–7.45)
PHOMA BETAE: <0.1 KU/L
PHOSPHATE SERPL-MCNC: 2.4 MG/DL (ref 3–5.5)
PHOSPHATE SERPL-MCNC: 2.5 MG/DL (ref 3–5.5)
PLATELET # BLD AUTO: 572 THOUSANDS/UL (ref 149–390)
PLATELET BLD QL SMEAR: ABNORMAL
PMV BLD AUTO: 9 FL (ref 8.6–11.7)
PO2 BLDA: 145 MM HG (ref 80–100)
POTASSIUM SERPL-SCNC: 3.2 MMOL/L (ref 3.5–5.5)
POTASSIUM SERPL-SCNC: 4.2 MMOL/L (ref 3.5–5.5)
POTASSIUM SERPL-SCNC: 4.4 MMOL/L (ref 3.5–5.5)
PROCALCITONIN SERPL-MCNC: 0.42 NG/ML
PROT SERPL-MCNC: 7.6 G/DL (ref 6.4–8.9)
PROTHROMBIN TIME: 10.8 SECONDS (ref 10.2–13)
RBC # BLD AUTO: 6 MILLION/UL (ref 3.9–5.2)
RBC MORPH BLD: NORMAL
SETOMELANOMMA ROSTRATA: <0.1 KU/L
SODIUM SERPL-SCNC: 128 MMOL/L (ref 134–143)
SODIUM SERPL-SCNC: 129 MMOL/L (ref 134–143)
SODIUM SERPL-SCNC: 132 MMOL/L (ref 134–143)
SPECIMEN SOURCE: ABNORMAL
STEMPHYLIUM HERBARUM: <0.1 KU/L
TROPONIN I SERPL-MCNC: 0.03 NG/ML
TROPONIN I SERPL-MCNC: 0.06 NG/ML
TROPONIN I SERPL-MCNC: <0.03 NG/ML
WBC # BLD AUTO: 19.6 THOUSAND/UL (ref 4.8–10.8)

## 2019-08-08 PROCEDURE — 02HV33Z INSERTION OF INFUSION DEVICE INTO SUPERIOR VENA CAVA, PERCUTANEOUS APPROACH: ICD-10-PCS | Performed by: INTERNAL MEDICINE

## 2019-08-08 PROCEDURE — 36558 INSERT TUNNELED CV CATH: CPT | Performed by: SURGERY

## 2019-08-08 PROCEDURE — 71045 X-RAY EXAM CHEST 1 VIEW: CPT

## 2019-08-08 PROCEDURE — 96374 THER/PROPH/DIAG INJ IV PUSH: CPT

## 2019-08-08 PROCEDURE — 82948 REAGENT STRIP/BLOOD GLUCOSE: CPT

## 2019-08-08 PROCEDURE — 84145 PROCALCITONIN (PCT): CPT | Performed by: NURSE PRACTITIONER

## 2019-08-08 PROCEDURE — 85610 PROTHROMBIN TIME: CPT | Performed by: EMERGENCY MEDICINE

## 2019-08-08 PROCEDURE — 36600 WITHDRAWAL OF ARTERIAL BLOOD: CPT

## 2019-08-08 PROCEDURE — 96361 HYDRATE IV INFUSION ADD-ON: CPT

## 2019-08-08 PROCEDURE — 93005 ELECTROCARDIOGRAM TRACING: CPT

## 2019-08-08 PROCEDURE — 84484 ASSAY OF TROPONIN QUANT: CPT | Performed by: INTERNAL MEDICINE

## 2019-08-08 PROCEDURE — 83605 ASSAY OF LACTIC ACID: CPT | Performed by: EMERGENCY MEDICINE

## 2019-08-08 PROCEDURE — 85025 COMPLETE CBC W/AUTO DIFF WBC: CPT | Performed by: EMERGENCY MEDICINE

## 2019-08-08 PROCEDURE — 99223 1ST HOSP IP/OBS HIGH 75: CPT | Performed by: INTERNAL MEDICINE

## 2019-08-08 PROCEDURE — 83735 ASSAY OF MAGNESIUM: CPT | Performed by: INTERNAL MEDICINE

## 2019-08-08 PROCEDURE — 99251 PR INITL INPATIENT CONSULT NEW/ESTAB PT 20 MIN: CPT | Performed by: SURGERY

## 2019-08-08 PROCEDURE — 96375 TX/PRO/DX INJ NEW DRUG ADDON: CPT

## 2019-08-08 PROCEDURE — 84100 ASSAY OF PHOSPHORUS: CPT | Performed by: INTERNAL MEDICINE

## 2019-08-08 PROCEDURE — 83880 ASSAY OF NATRIURETIC PEPTIDE: CPT | Performed by: EMERGENCY MEDICINE

## 2019-08-08 PROCEDURE — 83605 ASSAY OF LACTIC ACID: CPT | Performed by: INTERNAL MEDICINE

## 2019-08-08 PROCEDURE — 80048 BASIC METABOLIC PNL TOTAL CA: CPT | Performed by: INTERNAL MEDICINE

## 2019-08-08 PROCEDURE — 85730 THROMBOPLASTIN TIME PARTIAL: CPT | Performed by: EMERGENCY MEDICINE

## 2019-08-08 PROCEDURE — 96372 THER/PROPH/DIAG INJ SC/IM: CPT

## 2019-08-08 PROCEDURE — 36415 COLL VENOUS BLD VENIPUNCTURE: CPT | Performed by: EMERGENCY MEDICINE

## 2019-08-08 PROCEDURE — NC001 PR NO CHARGE: Performed by: SURGERY

## 2019-08-08 PROCEDURE — 84484 ASSAY OF TROPONIN QUANT: CPT | Performed by: EMERGENCY MEDICINE

## 2019-08-08 PROCEDURE — 82805 BLOOD GASES W/O2 SATURATION: CPT | Performed by: NURSE PRACTITIONER

## 2019-08-08 PROCEDURE — 80053 COMPREHEN METABOLIC PANEL: CPT | Performed by: EMERGENCY MEDICINE

## 2019-08-08 PROCEDURE — 99291 CRITICAL CARE FIRST HOUR: CPT

## 2019-08-08 PROCEDURE — 82010 KETONE BODYS QUAN: CPT | Performed by: NURSE PRACTITIONER

## 2019-08-08 RX ORDER — SODIUM CHLORIDE 9 MG/ML
500 INJECTION, SOLUTION INTRAVENOUS CONTINUOUS
Status: DISPENSED | OUTPATIENT
Start: 2019-08-08 | End: 2019-08-08

## 2019-08-08 RX ORDER — POTASSIUM CHLORIDE 29.8 MG/ML
40 INJECTION INTRAVENOUS ONCE
Status: COMPLETED | OUTPATIENT
Start: 2019-08-08 | End: 2019-08-09

## 2019-08-08 RX ORDER — DIPHENHYDRAMINE HYDROCHLORIDE 50 MG/ML
25 INJECTION INTRAMUSCULAR; INTRAVENOUS ONCE
Status: COMPLETED | OUTPATIENT
Start: 2019-08-08 | End: 2019-08-08

## 2019-08-08 RX ORDER — ASPIRIN 81 MG/1
81 TABLET ORAL DAILY
Status: DISCONTINUED | OUTPATIENT
Start: 2019-08-08 | End: 2019-08-10 | Stop reason: HOSPADM

## 2019-08-08 RX ORDER — DILTIAZEM HYDROCHLORIDE 5 MG/ML
10 INJECTION INTRAVENOUS ONCE
Status: COMPLETED | OUTPATIENT
Start: 2019-08-08 | End: 2019-08-08

## 2019-08-08 RX ORDER — DEXTROSE AND SODIUM CHLORIDE 5; .9 G/100ML; G/100ML
250 INJECTION, SOLUTION INTRAVENOUS CONTINUOUS
Status: DISCONTINUED | OUTPATIENT
Start: 2019-08-08 | End: 2019-08-08

## 2019-08-08 RX ORDER — POTASSIUM CHLORIDE 14.9 MG/ML
20 INJECTION INTRAVENOUS ONCE
Status: COMPLETED | OUTPATIENT
Start: 2019-08-09 | End: 2019-08-09

## 2019-08-08 RX ORDER — ACETAMINOPHEN 325 MG/1
650 TABLET ORAL EVERY 6 HOURS PRN
Status: DISCONTINUED | OUTPATIENT
Start: 2019-08-08 | End: 2019-08-10 | Stop reason: HOSPADM

## 2019-08-08 RX ORDER — MAGNESIUM SULFATE HEPTAHYDRATE 40 MG/ML
2 INJECTION, SOLUTION INTRAVENOUS ONCE
Status: COMPLETED | OUTPATIENT
Start: 2019-08-08 | End: 2019-08-08

## 2019-08-08 RX ORDER — SODIUM CHLORIDE 9 MG/ML
1000 INJECTION, SOLUTION INTRAVENOUS ONCE
Status: COMPLETED | OUTPATIENT
Start: 2019-08-08 | End: 2019-08-08

## 2019-08-08 RX ORDER — EPINEPHRINE 1 MG/ML
0.3 INJECTION, SOLUTION, CONCENTRATE INTRAVENOUS ONCE
Status: COMPLETED | OUTPATIENT
Start: 2019-08-08 | End: 2019-08-08

## 2019-08-08 RX ORDER — 0.9 % SODIUM CHLORIDE 0.9 %
3 VIAL (ML) INJECTION AS NEEDED
Status: DISCONTINUED | OUTPATIENT
Start: 2019-08-08 | End: 2019-08-08

## 2019-08-08 RX ORDER — SODIUM CHLORIDE 9 MG/ML
250 INJECTION, SOLUTION INTRAVENOUS CONTINUOUS
Status: DISPENSED | OUTPATIENT
Start: 2019-08-08 | End: 2019-08-09

## 2019-08-08 RX ORDER — POTASSIUM CHLORIDE 14.9 MG/ML
20 INJECTION INTRAVENOUS ONCE
Status: COMPLETED | OUTPATIENT
Start: 2019-08-08 | End: 2019-08-08

## 2019-08-08 RX ORDER — ONDANSETRON 2 MG/ML
4 INJECTION INTRAMUSCULAR; INTRAVENOUS EVERY 6 HOURS PRN
Status: DISCONTINUED | OUTPATIENT
Start: 2019-08-08 | End: 2019-08-10 | Stop reason: HOSPADM

## 2019-08-08 RX ORDER — DEXTROSE AND SODIUM CHLORIDE 5; .9 G/100ML; G/100ML
250 INJECTION, SOLUTION INTRAVENOUS CONTINUOUS
Status: DISCONTINUED | OUTPATIENT
Start: 2019-08-08 | End: 2019-08-09

## 2019-08-08 RX ORDER — LIDOCAINE HYDROCHLORIDE 10 MG/ML
INJECTION, SOLUTION EPIDURAL; INFILTRATION; INTRACAUDAL; PERINEURAL
Status: COMPLETED
Start: 2019-08-08 | End: 2019-08-08

## 2019-08-08 RX ORDER — POTASSIUM CHLORIDE 20 MEQ/1
20 TABLET, EXTENDED RELEASE ORAL ONCE
Status: COMPLETED | OUTPATIENT
Start: 2019-08-08 | End: 2019-08-08

## 2019-08-08 RX ORDER — BUSPIRONE HYDROCHLORIDE 15 MG/1
7.5 TABLET ORAL 2 TIMES DAILY
Status: DISCONTINUED | OUTPATIENT
Start: 2019-08-08 | End: 2019-08-10 | Stop reason: HOSPADM

## 2019-08-08 RX ORDER — DIPHENHYDRAMINE HYDROCHLORIDE 50 MG/ML
25 INJECTION INTRAMUSCULAR; INTRAVENOUS EVERY 6 HOURS PRN
Status: DISCONTINUED | OUTPATIENT
Start: 2019-08-08 | End: 2019-08-10 | Stop reason: HOSPADM

## 2019-08-08 RX ORDER — METHYLPREDNISOLONE SODIUM SUCCINATE 125 MG/2ML
125 INJECTION, POWDER, LYOPHILIZED, FOR SOLUTION INTRAMUSCULAR; INTRAVENOUS ONCE
Status: COMPLETED | OUTPATIENT
Start: 2019-08-08 | End: 2019-08-08

## 2019-08-08 RX ADMIN — SODIUM CHLORIDE 500 ML/HR: 0.9 INJECTION, SOLUTION INTRAVENOUS at 14:00

## 2019-08-08 RX ADMIN — MAGNESIUM SULFATE IN WATER 2 G: 40 INJECTION, SOLUTION INTRAVENOUS at 15:48

## 2019-08-08 RX ADMIN — SODIUM CHLORIDE 38 UNITS/HR: 9 INJECTION, SOLUTION INTRAVENOUS at 20:17

## 2019-08-08 RX ADMIN — ENOXAPARIN SODIUM 40 MG: 40 INJECTION SUBCUTANEOUS at 15:51

## 2019-08-08 RX ADMIN — ASPIRIN 81 MG: 81 TABLET, COATED ORAL at 15:49

## 2019-08-08 RX ADMIN — POTASSIUM CHLORIDE 40 MEQ: 400 INJECTION, SOLUTION INTRAVENOUS at 23:04

## 2019-08-08 RX ADMIN — POTASSIUM CHLORIDE 20 MEQ: 1500 TABLET, EXTENDED RELEASE ORAL at 15:49

## 2019-08-08 RX ADMIN — METHYLPREDNISOLONE SODIUM SUCCINATE 125 MG: 125 INJECTION, POWDER, FOR SOLUTION INTRAMUSCULAR; INTRAVENOUS at 10:35

## 2019-08-08 RX ADMIN — FAMOTIDINE 20 MG: 10 INJECTION INTRAVENOUS at 10:41

## 2019-08-08 RX ADMIN — SODIUM CHLORIDE 38 UNITS/HR: 9 INJECTION, SOLUTION INTRAVENOUS at 22:51

## 2019-08-08 RX ADMIN — SODIUM CHLORIDE 9.5 UNITS/HR: 9 INJECTION, SOLUTION INTRAVENOUS at 12:26

## 2019-08-08 RX ADMIN — EPINEPHRINE 0.3 MG: 1 INJECTION, SOLUTION, CONCENTRATE INTRAVENOUS at 10:38

## 2019-08-08 RX ADMIN — INSULIN HUMAN 9 UNITS: 100 INJECTION, SOLUTION PARENTERAL at 12:27

## 2019-08-08 RX ADMIN — DEXTROSE AND SODIUM CHLORIDE 250 ML/HR: 5; 900 INJECTION, SOLUTION INTRAVENOUS at 22:34

## 2019-08-08 RX ADMIN — DILTIAZEM HYDROCHLORIDE 10 MG: 5 INJECTION INTRAVENOUS at 11:00

## 2019-08-08 RX ADMIN — BUSPIRONE HYDROCHLORIDE 7.5 MG: 7.5 TABLET ORAL at 17:17

## 2019-08-08 RX ADMIN — SODIUM CHLORIDE 1000 ML: 0.9 INJECTION, SOLUTION INTRAVENOUS at 11:45

## 2019-08-08 RX ADMIN — DILTIAZEM HYDROCHLORIDE 15 MG/HR: 5 INJECTION INTRAVENOUS at 20:53

## 2019-08-08 RX ADMIN — SODIUM PHOSPHATE, MONOBASIC, MONOHYDRATE 12 MMOL: 276; 142 INJECTION, SOLUTION INTRAVENOUS at 17:18

## 2019-08-08 RX ADMIN — SODIUM PHOSPHATE, MONOBASIC, MONOHYDRATE 12 MMOL: 276; 142 INJECTION, SOLUTION INTRAVENOUS at 22:48

## 2019-08-08 RX ADMIN — SODIUM CHLORIDE 250 ML/HR: 9 INJECTION, SOLUTION INTRAVENOUS at 17:18

## 2019-08-08 RX ADMIN — LIDOCAINE HYDROCHLORIDE 50 MG: 10 INJECTION, SOLUTION EPIDURAL; INFILTRATION; INTRACAUDAL; PERINEURAL at 17:08

## 2019-08-08 RX ADMIN — SODIUM CHLORIDE 1000 ML: 0.9 INJECTION, SOLUTION INTRAVENOUS at 10:33

## 2019-08-08 RX ADMIN — SODIUM CHLORIDE 250 ML/HR: 9 INJECTION, SOLUTION INTRAVENOUS at 21:28

## 2019-08-08 RX ADMIN — DIPHENHYDRAMINE HYDROCHLORIDE 25 MG: 50 INJECTION, SOLUTION INTRAMUSCULAR; INTRAVENOUS at 10:34

## 2019-08-08 RX ADMIN — SODIUM CHLORIDE 1000 ML/HR: 0.9 INJECTION, SOLUTION INTRAVENOUS at 15:50

## 2019-08-08 RX ADMIN — Medication 800 MG: at 22:35

## 2019-08-08 NOTE — ASSESSMENT & PLAN NOTE
· No history of atrial fibrillation  · Will continue Cardizem drip and titrate as tolerated  · Check echo  · Cardiology consult

## 2019-08-08 NOTE — NURSING NOTE
Attempted to obtain labs at 1300 and 1600, 4 different times  Unable to obtain ordered labs   made aware of same  New orders pending

## 2019-08-08 NOTE — ASSESSMENT & PLAN NOTE
· Unclear cause of patient's allergic reaction  · Patient received steroids/Benadryl in the ER  · Appears to have improved  · Will hold off on steroids at this point given patient's DKA  · Continue Benadryl as needed

## 2019-08-08 NOTE — NURSING NOTE
Dr made aware of lab results and current bs of 347  Per protocol I am to double insulin gtt  Per Dr Parvez Landers, do not double gtt at this time

## 2019-08-08 NOTE — TELEPHONE ENCOUNTER
Date/Time Called in: 08/08 @ 1623  Type of Consult: Routine  Facility: [facility]   Unit: Med Surg  Room: ICU Bed 3  Phone: [phone]  Physician Consulted: Dr Lolly Henley   Patient: Kourtney Young Record Number: [id number]  Admitting Diagnosis: DKA   Reason for Consult: Diabetic Ket Asedosis   Which Physician Notified: Dr Pasha Damon   Date/ Time Physician Notified: 08/08 @ [phone]     Actual Page:  [phone]/ [place]/  [facility]/ PT   Sherie Herrera 1965/  Consult/ Routine

## 2019-08-08 NOTE — ED PROVIDER NOTES
History  Chief Complaint   Patient presents with    Vomiting     Patient states she he did have some leftover is last night and then overnight prominent developed nausea vomiting of a showed she was rollover felt dizzy on not sure she has started out as she says she did fall current weight is she is tachycardic skin is very erythematous from head to toe she is awake alert oriented no stridor noted respiratory difficulty heart rates in the 140s blood pressures improved dramatically 1 teens she stated that she did take a dose of Benadryl which made her on diffuse itching worse current we know hives noted, pt states pcp was changed diabetic medication regimen recently, states blood sugars have been running in 300's recently, did not take any of medication this am          Prior to Admission Medications   Prescriptions Last Dose Informant Patient Reported? Taking? ASPIRIN 81 PO   Yes No   Sig: Take by mouth daily   Blood Glucose Monitoring Suppl (FREESTYLE LITE) ASHLEY   No No   Sig: by Does not apply route 2 (two) times a day FreeStyle Lite Device (Glucometer)   FREESTYLE TEST STRIPS test strip   Yes No   Semaglutide (OZEMPIC) 0 25 or 0 5 MG/DOSE SOPN   No No   Sig: Inject 0 25 mg under the skin once a week for 180 days   busPIRone (BUSPAR) 15 mg tablet   Yes No   Sig: Take 15 mg by mouth TAKE 1/2 TABLET TWICE DAILY    colesevelam (WELCHOL) 625 mg tablet   No No   Sig: TAKE 3 TABLETS TWICE A DAY     gemfibrozil (LOPID) 600 mg tablet   No No   Sig: Take 1 tablet (600 mg total) by mouth 2 (two) times a day   glucose monitoring kit (FREESTYLE) monitoring kit   No No   Si each by Does not apply route 2 (two) times a day   lisinopril (ZESTRIL) 20 mg tablet   No No   Sig: Take 1 tablet (20 mg total) by mouth daily   metFORMIN (GLUCOPHAGE) 1000 MG tablet   No No   Sig: TAKE 1 TABLET (1,000 MG TOTAL) BY MOUTH 2 (TWO) TIMES A DAY WITH MEALS TAKE 1/2 IN MORNING AND 1 TABLET IN THE EVENING      Facility-Administered Medications: None       Past Medical History:   Diagnosis Date    Bilateral hydronephrosis 1/24/2018    Hypertension     Left thyroid nodule 1/24/2018    Lupus ([facility] [address] )     Lupus anticoagulant positive 1/24/2018    Non Hodgkin's lymphoma ([facility] ) 11/2/2016    Panic attack        No past surgical history on file  Family History   Problem Relation Age of Onset    Dementia Mother     Parkinsonism Mother      I have reviewed and agree with the history as documented  Social History     Tobacco Use    Smoking status: Never Smoker    Smokeless tobacco: Never Used   Substance Use Topics    Alcohol use: No    Drug use: No        Review of Systems   Gastrointestinal: Positive for vomiting  Skin: Positive for rash  Physical Exam  Physical Exam   Constitutional: She appears well-developed and well-nourished  HENT:   Head: Normocephalic and atraumatic  Eyes: Pupils are equal, round, and reactive to light  EOM are normal    Neck: Normal range of motion  Neck supple  Cardiovascular:   's afib   Pulmonary/Chest: Effort normal and breath sounds normal    No stridor   Abdominal: Soft  Bowel sounds are normal    Musculoskeletal: Normal range of motion  Neurological: She is alert  Skin: Skin is warm  Capillary refill takes less than 2 seconds  Rash noted  Diffuse erythema and itching    Psychiatric: She has a normal mood and affect  Nursing note and vitals reviewed        Vital Signs  ED Triage Vitals [08/08/19 1012]   Temperature Pulse Respirations Blood Pressure SpO2   (!) 97 °F (36 1 °C) 74 18 (!) 63/47 95 %      Temp Source Heart Rate Source Patient Position - Orthostatic VS BP Location FiO2 (%)   Temporal -- Sitting Left arm --      Pain Score       No Pain           Vitals:    08/08/19 1012   BP: (!) 63/47   Pulse: 74   Patient Position - Orthostatic VS: Sitting         Visual Acuity      ED Medications  Medications   famotidine (PEPCID) injection 20 mg (has no administration in time range)   EPINEPHrine PF (ADRENALIN) 1 mg/mL injection 0 3 mg (has no administration in time range)   sodium chloride 0 9 % bolus 1,000 mL (1,000 mL Intravenous New Bag 8/8/19 1033)   diphenhydrAMINE (BENADRYL) injection 25 mg (25 mg Intravenous Given 8/8/19 1034)   methylPREDNISolone sodium succinate (Solu-MEDROL) injection 125 mg (125 mg Intravenous Given 8/8/19 1035)       Diagnostic Studies  Results Reviewed     Procedure Component Value Units Date/Time    Lactic acid, plasma [[id number]]     Lab Status:  No result Specimen:  Blood     CBC and differential [[id number]]     Lab Status:  No result Specimen:  Blood     Protime-INR [[id number]]     Lab Status:  No result Specimen:  Blood     APTT [[id number]]     Lab Status:  No result Specimen:  Blood     Comprehensive metabolic panel [[id number]]     Lab Status:  No result Specimen:  Blood     B-Type Natriuretic Peptide [facility] ONLY) [[id number]]     Lab Status:  No result Specimen:  Blood     Troponin I [[id number]]     Lab Status:  No result Specimen:  Blood                  XR chest 1 view portable    (Results Pending)              Procedures  Procedures       ED Course   accepted by Dr Murray Gu for cc mgt, pt feels better after meds fluids, erythema resolving, RVR intermittent after IV bolus cardizem, insulin drip started for elevated glucose  Pt aware of results and agreeable with plan                             MDM    Disposition  Final diagnoses:   None     ED Disposition     None      Follow-up Information    None         Patient's Medications   Discharge Prescriptions    No medications on file     No discharge procedures on file      ED Provider  Electronically Signed by           LIDIA Rios  08/08/19 3403

## 2019-08-08 NOTE — CONSULTS
Consultation - General Surgery   Juan Wagner 53 y o  female MRN: [id number]  Unit/Bed#: ICU 03 Encounter: [id number]    Assessment/Plan     Assessment:  Patient is a pleasant obese 76-year-old female in diabetic ketoacidosis requiring frequent blood draws for which reliable vascular access is required in for which consultation has been obtained with general surgery for central line insertion  Plan:  The technical details of central line insertion under ultrasound guidance in the right internal jugular vein was explained  The risks of the procedure related to local anesthesia, bleeding, infection, neurovascular injury and 2% risk of pneumothorax requiring chest tube insertion was explained in clear simple terms  All questions answered to the satisfaction of the patient and informed consent obtained to proceed  History of Present Illness     HPI:  Juan Wagner is a 53 y o  female who presents with diabetic ketoacidosis  Inpatient consult to Acute Care Surgery  Consult performed by: Doc Perez MD  Consult ordered by: Maday Franco MD          Review of Systems   All other systems reviewed and are negative  Historical Information   Past Medical History:   Diagnosis Date    Bilateral hydronephrosis 1/24/2018    Hypertension     Left thyroid nodule 1/24/2018    Lupus ([facility]     Lupus anticoagulant positive 1/24/2018    Non Hodgkin's lymphoma ([facility] [address] [facility] ) 11/2/2016    Panic attack      History reviewed  No pertinent surgical history    Social History   Social History     Substance and Sexual Activity   Alcohol Use No    Frequency: Never    Drinks per session: Patient refused    Binge frequency: Never     Social History     Substance and Sexual Activity   Drug Use No     Social History     Tobacco Use   Smoking Status Never Smoker   Smokeless Tobacco Never Used     Family History: non-contributory    Meds/Allergies   all current active meds have been reviewed  Allergies   Allergen Reactions    Clam Shell      Clam family    Penicillins Rash       Objective   First Vitals:   Blood Pressure: (!) 63/47 (08/08/19 1012)  Pulse: 74 (08/08/19 1012)  Temperature: (!) 97 °F (36 1 °C) (08/08/19 1012)  Temp Source: Temporal (08/08/19 1012)  Respirations: 18 (08/08/19 1012)  Height: 5' (152 4 cm) (08/08/19 1012)  Weight - Scale: 95 3 kg (210 lb) (08/08/19 1012)  SpO2: 95 % (08/08/19 1012)    Current Vitals:   Blood Pressure: 103/60 (08/08/19 1400)  Pulse: (!) 112 (08/08/19 1415)  Temperature: 98 1 °F (36 7 °C) (08/08/19 1310)  Temp Source: Temporal (08/08/19 1310)  Respirations: (!) 27 (08/08/19 1415)  Height: 4' 11" (149 9 cm) (08/08/19 1301)  Weight - Scale: 93 1 kg (205 lb 3 2 oz) (08/08/19 1320)  SpO2: 91 % (08/08/19 1415)      Intake/Output Summary (Last 24 hours) at 8/8/2019 1732  Last data filed at 8/8/2019 1333  Gross per 24 hour   Intake 960 ml   Output    Net 960 ml       Invasive Devices     Peripheral Intravenous Line            Peripheral IV 08/08/19 Left Antecubital less than 1 day    Peripheral IV 08/08/19 Left Hand less than 1 day                Physical Exam   Constitutional: She is oriented to person, place, and time  Patient is well-nourished well-developed female  She is competent reliable historian  HENT:   Head: Normocephalic and atraumatic  Eyes: Pupils are equal, round, and reactive to light  Conjunctivae are normal    Neck: Normal range of motion  Neck supple  Cardiovascular: Normal rate, regular rhythm and normal heart sounds  Pulmonary/Chest: Effort normal and breath sounds normal    Abdominal: Soft  Bowel sounds are normal    Musculoskeletal: Normal range of motion  Neurological: She is alert and oriented to person, place, and time  Skin: Skin is warm and dry  Psychiatric: Her behavior is normal  Judgment and thought content normal    Vitals reviewed  Lab Results: I have personally reviewed pertinent lab results      Imaging: I have personally reviewed pertinent reports  EKG, Pathology, and Other Studies: I have personally reviewed pertinent reports  Counseling / Coordination of Care  Total floor / unit time spent today 25 minutes  Greater than 50% of total time was spent with the patient and / or family counseling and / or coordination of care  A description of the counseling / coordination of care: 15

## 2019-08-08 NOTE — H&P
H&P- Demetrio Sahu 1965, 53 y o  female MRN: [id number]    Unit/Bed#: ICU 03 Encounter: [id number]    Primary Care Provider: Nathalie Harvey DO   Date and time admitted to hospital: 8/8/2019 10:28 AM        * DKA (diabetic ketoacidoses) ([facility] [address] )  Assessment & Plan  · Admitted to the ICU  · Continue insulin drip  · Monitor fingersticks and adjust drip per protocol  · Continue electrolyte supplementation  · Continue IV fluids  · Clear liquid diabetic diet    Rapid atrial fibrillation (HCC)  Assessment & Plan  · No history of atrial fibrillation  · Will continue Cardizem drip and titrate as tolerated  · Check echo  · Cardiology consult    Allergic reaction  Assessment & Plan  · Unclear cause of patient's allergic reaction  · Patient received steroids/Benadryl in the ER  · Appears to have improved  · Will hold off on steroids at this point given patient's DKA  · Continue Benadryl as needed    Type 2 diabetes mellitus without complication, without long-term current use of insulin ([facility] )  Assessment & Plan  · Hold home meds for now  · Patient states that she is on a weekly injection  · Patient also states that she cannot take insulin due to her job, she only takes p o  Medications and her weekly injection  · Follow-up A1c  · Continue treatment as above    Hyperlipemia  Assessment & Plan  · Continue gemfibrozil    Benign essential hypertension  Assessment & Plan  · Patient was noted to be hypotensive on admission  · Will hold antihypertensives for now  · Continue Cardizem drip with parameters      VTE Prophylaxis: Enoxaparin (Lovenox)  Code Status:  DNR  POLST: There is no POLST form on file for this patient (pre-hospital)  Discussion with family:  No family available at bedside during my evaluation    Anticipated Length of Stay:  Patient will be admitted on an Inpatient basis with an anticipated length of stay of  > 2 midnights     Justification for Hospital Stay:  DKA      Chief Complaint:   Dizziness    History of Present Illness:    Shady Munoz is a 53 y o  female who presents with dizziness/lightheaded  Patient states that she woke up early this morning and felt nauseous which subsequently led to her vomiting  Vomit was nonbloody  Patient states that she went to bed feeling well and did not eat anything out of the ordinary  In the morning after vomiting she also noticed a rash throughout her entire body  Patient developed dizziness and lightheadedness and subsequently presented to the ER  In the ER patient was noted to have a diffuse rash in was treated as an allergic reaction with steroids, Benadryl, Pepcid  Patient was also noted to be in DKA and started on insulin drip  Patient was also noted to be in rapid AFib and started on Cardizem drip  This time patient denies any chest pain or shortness of breath  No previous DKA episodes  No recent hospitalizations  No recent fever or chills  Review of Systems:  Review of Systems   Constitutional: Positive for activity change, appetite change and fatigue  Negative for chills and fever  Respiratory: Negative for cough and shortness of breath  Cardiovascular: Positive for palpitations  Gastrointestinal: Positive for nausea and vomiting  Genitourinary: Negative for dysuria  Musculoskeletal: Negative for back pain  Skin: Positive for rash  Neurological: Positive for dizziness and light-headedness  Negative for seizures and syncope  All other systems reviewed and are negative  Past Medical and Surgical History:   Past Medical History:   Diagnosis Date    Bilateral hydronephrosis 1/24/2018    Hypertension     Left thyroid nodule 1/24/2018    Lupus ([facility] )     Lupus anticoagulant positive 1/24/2018    Non Hodgkin's lymphoma ([facility] ) 11/2/2016    Panic attack        History reviewed  No pertinent surgical history  Meds/Allergies:  Prior to Admission medications    Medication Sig Start Date End Date Taking?  Authorizing Provider   busPIRone (BUSPAR) 15 mg tablet Take 15 mg by mouth 2 (two) times a day TAKE 1/2 TABLET TWICE DAILY   Yes Historical Provider, MD   colesevelam (WELCHOL) 625 mg tablet TAKE 3 TABLETS TWICE A DAY  4/22/19  Yes Ulises Merrill, DO   gemfibrozil (LOPID) 600 mg tablet Take 1 tablet (600 mg total) by mouth 2 (two) times a day 4/22/19  Yes Ulises Merrill, DO   lisinopril (ZESTRIL) 20 mg tablet Take 1 tablet (20 mg total) by mouth daily 10/3/18  Yes Ulises Merrill, DO   metFORMIN (GLUCOPHAGE) 1000 MG tablet TAKE 1 TABLET (1,000 MG TOTAL) BY MOUTH 2 (TWO) TIMES A DAY WITH MEALS TAKE 1/2 IN MORNING AND 1 TABLET IN THE EVENING  Patient taking differently: Take 1,000 mg by mouth 2 (two) times a day with meals  5/22/19  Yes Ulises Merrill, DO   Semaglutide (OZEMPIC) 0 25 or 0 5 MG/DOSE SOPN Inject 0 25 mg under the skin once a week for 180 days 4/22/19 10/19/19 Yes Ulises Merrill,    ASPIRIN 81 PO Take by mouth daily    Historical Provider, MD   Blood Glucose Monitoring Suppl (FREESTYLE LITE) ASHLEY by Does not apply route 2 (two) times a day FreeStyle Lite Device (Glucometer) 4/19/18   Ulises Merrill DO   FREESTYLE TEST STRIPS test strip  5/16/18   Historical Provider, MD   glucose monitoring kit (FREESTYLE) monitoring kit 1 each by Does not apply route 2 (two) times a day 8/1/18   Ulises Merrill DO     I have reviewed home medications with patient personally  Allergies:    Allergies   Allergen Reactions    Clam Shell      Clam family    Penicillins Rash       Social History:  Marital Status: Single   Occupation:  Works as a   Patient Pre-hospital Living Situation:  Lives alone with 13 cats  Patient Pre-hospital Level of Mobility:  Ambulatory  Patient Pre-hospital Diet Restrictions:  None  Substance Use History:     Social History     Substance and Sexual Activity   Alcohol Use No     Social History     Tobacco Use   Smoking Status Never Smoker   Smokeless Tobacco Never Used     Social History     Substance and Sexual Activity Drug Use No       Family History: Mother with history of dementia and Parkinson's    Physical Exam:   Vitals:   Blood Pressure: 111/62 (08/08/19 1330)  Pulse: (!) 119 (08/08/19 1330)  Temperature: 98 1 °F (36 7 °C) (08/08/19 1310)  Temp Source: Temporal (08/08/19 1310)  Respirations: (!) 26 (08/08/19 1330)  Height: 4' 11" (149 9 cm) (08/08/19 1301)  Weight - Scale: 93 1 kg (205 lb 3 2 oz) (08/08/19 1320)  SpO2: 92 % (08/08/19 1330)    Physical Exam   Constitutional: She appears well-developed  No distress  HENT:   Head: Normocephalic and atraumatic  Eyes: Conjunctivae and EOM are normal    Neck: Normal range of motion  Neck supple  Cardiovascular: An irregular rhythm present  Tachycardia present  Pulmonary/Chest: Effort normal  No respiratory distress  Abdominal: Soft  She exhibits no distension  There is no tenderness  Musculoskeletal: She exhibits edema  She exhibits no tenderness  Congenital deformity of left lower extremity   Neurological: She is alert  No cranial nerve deficit  Skin: Skin is warm and dry  Diffuse redness/erythematous rash noted which appears to be improving per patient   Psychiatric: She has a normal mood and affect  Her behavior is normal        Additional Data:   Lab Results: I have personally reviewed pertinent reports  Results from last 7 days   Lab Units 08/08/19  1042   WBC Thousand/uL 19 60*   HEMOGLOBIN g/dL 18 6*   HEMATOCRIT % 57 5*   PLATELETS Thousands/uL 572*   NEUTROS PCT % 88*   LYMPHS PCT % 9*   MONOS PCT % 2   EOS PCT % 0     Results from last 7 days   Lab Units 08/08/19  1043   POTASSIUM mmol/L 4 4   CHLORIDE mmol/L 89*   CO2 mmol/L 17*   BUN mg/dL 25   CREATININE mg/dL 1 26*   CALCIUM mg/dL 9 4   ALK PHOS U/L 129   ALT U/L 28   AST U/L 22     Results from last 7 days   Lab Units 08/08/19  1043   INR  0 93     Results from last 7 days   Lab Units 08/08/19  1219   POC GLUCOSE mg/dl >500*           Imaging: I have personally reviewed pertinent reports  XR chest 1 view portable   Final Result by Gui Elizondo MD (08/08 1138)      No acute cardiopulmonary disease  Workstation performed: [id number]             [facility] Records Reviewed: Yes     ** Please Note: This note has been constructed using a voice recognition system   **

## 2019-08-08 NOTE — ASSESSMENT & PLAN NOTE
· Admitted to the ICU  · Continue insulin drip  · Monitor fingersticks and adjust drip per protocol  · Continue electrolyte supplementation  · Continue IV fluids  · Clear liquid diabetic diet

## 2019-08-08 NOTE — ASSESSMENT & PLAN NOTE
· Hold home meds for now  · Patient states that she is on a weekly injection  · Patient also states that she cannot take insulin due to her job, she only takes p o   Medications and her weekly injection  · Follow-up A1c  · Continue treatment as above

## 2019-08-08 NOTE — NURSING NOTE
Pt admitted to icu #3, dx dka and new afib  Pt awake, alert, and oriented x4  Assessment as noted in computer charting  Assisted to bed and placed on monitor  heartrate = afib  cardizem gtt currently at 12 5 mg/hr and insulin gtt currently at 9 5 u/hr  Oriented to room and unit, safety in place  No c/o at this time  Denies nausea and vomiting

## 2019-08-08 NOTE — SOCIAL WORK
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

## 2019-08-08 NOTE — PROCEDURES
Central Line Insertion  Date/Time: 8/8/2019 5:34 PM  Performed by: Miguel Orosco MD  Authorized by: Miguel Orosco MD     Patient location:  Bedside  Consent:     Consent obtained:  Written    Consent given by:  Patient    Risks discussed:  Arterial puncture, bleeding, pneumothorax, nerve damage and infection  Universal protocol:     Procedure explained and questions answered to patient or proxy's satisfaction: yes      Site/side marked: yes      Immediately prior to procedure, a time out was called: yes      Patient identity confirmed:  Verbally with patient  Pre-procedure details:     Hand hygiene: Hand hygiene performed prior to insertion      Sterile barrier technique: All elements of maximal sterile technique followed      Skin preparation:  2% chlorhexidine    Skin preparation agent: Skin preparation agent completely dried prior to procedure    Indications:     Central line indications: no peripheral vascular access    Anesthesia (see MAR for exact dosages): Anesthesia method:  Local infiltration    Local anesthetic:  Lidocaine 1% w/o epi  Procedure details:     Location:  Right internal jugular    Vessel type: vein      Approach: percutaneous technique used      Patient position:  Flat    Catheter type:  Triple lumen 20cm    Catheter size:  8 Fr    Landmarks identified: yes      Ultrasound guidance: yes      Sterile ultrasound techniques: Sterile gel and sterile probe covers were used      Successful placement: yes      Vessel of catheter tip end:  Superior vena cava  Post-procedure details:     Post-procedure:  Dressing applied    Assessment:  Blood return through all ports, free fluid flow, no pneumothorax on x-ray and placement verified by x-ray    Patient tolerance of procedure:   Tolerated well, no immediate complications

## 2019-08-08 NOTE — ASSESSMENT & PLAN NOTE
· Patient was noted to be hypotensive on admission  · Will hold antihypertensives for now  · Continue Cardizem drip with parameters

## 2019-08-09 ENCOUNTER — APPOINTMENT (INPATIENT)
Dept: NON INVASIVE DIAGNOSTICS | Facility: HOSPITAL | Age: 54
DRG: 638 | End: 2019-08-09
Payer: COMMERCIAL

## 2019-08-09 LAB
ANION GAP SERPL CALCULATED.3IONS-SCNC: 5 MMOL/L (ref 4–13)
ANION GAP SERPL CALCULATED.3IONS-SCNC: 9 MMOL/L (ref 4–13)
ATRIAL RATE: 112 BPM
ATRIAL RATE: 166 BPM
BASOPHILS # BLD AUTO: 0.1 THOUSANDS/ΜL (ref 0–0.1)
BASOPHILS NFR BLD AUTO: 0 % (ref 0–2)
BUN SERPL-MCNC: 16 MG/DL (ref 7–25)
BUN SERPL-MCNC: 19 MG/DL (ref 7–25)
CALCIUM SERPL-MCNC: 7.4 MG/DL (ref 8.6–10.5)
CALCIUM SERPL-MCNC: 7.5 MG/DL (ref 8.6–10.5)
CHLORIDE SERPL-SCNC: 108 MMOL/L (ref 98–107)
CHLORIDE SERPL-SCNC: 109 MMOL/L (ref 98–107)
CO2 SERPL-SCNC: 20 MMOL/L (ref 21–31)
CO2 SERPL-SCNC: 23 MMOL/L (ref 21–31)
CREAT SERPL-MCNC: 0.69 MG/DL (ref 0.6–1.2)
CREAT SERPL-MCNC: 0.7 MG/DL (ref 0.6–1.2)
EOSINOPHIL # BLD AUTO: 0.2 THOUSAND/ΜL (ref 0–0.61)
EOSINOPHIL NFR BLD AUTO: 1 % (ref 0–5)
ERYTHROCYTE [DISTWIDTH] IN BLOOD BY AUTOMATED COUNT: 13.7 % (ref 11.5–14.5)
GFR SERPL CREATININE-BSD FRML MDRD: 98 ML/MIN/1.73SQ M
GFR SERPL CREATININE-BSD FRML MDRD: 98 ML/MIN/1.73SQ M
GLUCOSE SERPL-MCNC: 123 MG/DL (ref 65–140)
GLUCOSE SERPL-MCNC: 131 MG/DL (ref 65–140)
GLUCOSE SERPL-MCNC: 162 MG/DL (ref 65–140)
GLUCOSE SERPL-MCNC: 188 MG/DL (ref 65–140)
GLUCOSE SERPL-MCNC: 198 MG/DL (ref 65–140)
GLUCOSE SERPL-MCNC: 219 MG/DL (ref 65–140)
GLUCOSE SERPL-MCNC: 256 MG/DL (ref 65–140)
GLUCOSE SERPL-MCNC: 292 MG/DL (ref 65–140)
GLUCOSE SERPL-MCNC: 69 MG/DL (ref 65–140)
GLUCOSE SERPL-MCNC: 70 MG/DL (ref 65–140)
GLUCOSE SERPL-MCNC: 86 MG/DL (ref 65–99)
GLUCOSE SERPL-MCNC: 89 MG/DL (ref 65–140)
GLUCOSE SERPL-MCNC: 89 MG/DL (ref 65–140)
GLUCOSE SERPL-MCNC: 97 MG/DL (ref 65–140)
GLUCOSE SERPL-MCNC: 98 MG/DL (ref 65–99)
HCT VFR BLD AUTO: 37.1 % (ref 42–47)
HGB BLD-MCNC: 12.6 G/DL (ref 12–16)
LYMPHOCYTES # BLD AUTO: 1.9 THOUSANDS/ΜL (ref 0.6–4.47)
LYMPHOCYTES NFR BLD AUTO: 11 % (ref 21–51)
MAGNESIUM SERPL-MCNC: 1.9 MG/DL (ref 1.9–2.7)
MAGNESIUM SERPL-MCNC: 2 MG/DL (ref 1.9–2.7)
MCH RBC QN AUTO: 30.6 PG (ref 26–34)
MCHC RBC AUTO-ENTMCNC: 34.1 G/DL (ref 31–37)
MCV RBC AUTO: 90 FL (ref 81–99)
MONOCYTES # BLD AUTO: 0.7 THOUSAND/ΜL (ref 0.17–1.22)
MONOCYTES NFR BLD AUTO: 4 % (ref 2–12)
NEUTROPHILS # BLD AUTO: 13.8 THOUSANDS/ΜL (ref 1.4–6.5)
NEUTS SEG NFR BLD AUTO: 83 % (ref 42–75)
P AXIS: 51 DEGREES
PHOSPHATE SERPL-MCNC: 2.4 MG/DL (ref 3–5.5)
PHOSPHATE SERPL-MCNC: 2.7 MG/DL (ref 3–5.5)
PLATELET # BLD AUTO: 371 THOUSANDS/UL (ref 149–390)
PMV BLD AUTO: 7.9 FL (ref 8.6–11.7)
POTASSIUM SERPL-SCNC: 4.1 MMOL/L (ref 3.5–5.5)
POTASSIUM SERPL-SCNC: 4.5 MMOL/L (ref 3.5–5.5)
PR INTERVAL: 164 MS
PROCALCITONIN SERPL-MCNC: 1.79 NG/ML
QRS AXIS: 7 DEGREES
QRS AXIS: 78 DEGREES
QRSD INTERVAL: 82 MS
QRSD INTERVAL: 84 MS
QT INTERVAL: 298 MS
QT INTERVAL: 350 MS
QTC INTERVAL: 466 MS
QTC INTERVAL: 477 MS
RBC # BLD AUTO: 4.13 MILLION/UL (ref 3.9–5.2)
SODIUM SERPL-SCNC: 137 MMOL/L (ref 134–143)
SODIUM SERPL-SCNC: 137 MMOL/L (ref 134–143)
T WAVE AXIS: 65 DEGREES
T WAVE AXIS: 77 DEGREES
TROPONIN I SERPL-MCNC: 0.06 NG/ML
VENTRICULAR RATE: 112 BPM
VENTRICULAR RATE: 147 BPM
WBC # BLD AUTO: 16.6 THOUSAND/UL (ref 4.8–10.8)

## 2019-08-09 PROCEDURE — 99222 1ST HOSP IP/OBS MODERATE 55: CPT | Performed by: INTERNAL MEDICINE

## 2019-08-09 PROCEDURE — 97165 OT EVAL LOW COMPLEX 30 MIN: CPT

## 2019-08-09 PROCEDURE — 82948 REAGENT STRIP/BLOOD GLUCOSE: CPT

## 2019-08-09 PROCEDURE — 83735 ASSAY OF MAGNESIUM: CPT | Performed by: INTERNAL MEDICINE

## 2019-08-09 PROCEDURE — G8988 SELF CARE GOAL STATUS: HCPCS

## 2019-08-09 PROCEDURE — 84100 ASSAY OF PHOSPHORUS: CPT | Performed by: INTERNAL MEDICINE

## 2019-08-09 PROCEDURE — 93010 ELECTROCARDIOGRAM REPORT: CPT | Performed by: INTERNAL MEDICINE

## 2019-08-09 PROCEDURE — 99232 SBSQ HOSP IP/OBS MODERATE 35: CPT | Performed by: INTERNAL MEDICINE

## 2019-08-09 PROCEDURE — 93306 TTE W/DOPPLER COMPLETE: CPT | Performed by: INTERNAL MEDICINE

## 2019-08-09 PROCEDURE — 85025 COMPLETE CBC W/AUTO DIFF WBC: CPT | Performed by: INTERNAL MEDICINE

## 2019-08-09 PROCEDURE — G8987 SELF CARE CURRENT STATUS: HCPCS

## 2019-08-09 PROCEDURE — G8978 MOBILITY CURRENT STATUS: HCPCS

## 2019-08-09 PROCEDURE — 93306 TTE W/DOPPLER COMPLETE: CPT

## 2019-08-09 PROCEDURE — 84145 PROCALCITONIN (PCT): CPT | Performed by: INTERNAL MEDICINE

## 2019-08-09 PROCEDURE — 80048 BASIC METABOLIC PNL TOTAL CA: CPT | Performed by: INTERNAL MEDICINE

## 2019-08-09 PROCEDURE — 97163 PT EVAL HIGH COMPLEX 45 MIN: CPT

## 2019-08-09 PROCEDURE — G8979 MOBILITY GOAL STATUS: HCPCS

## 2019-08-09 PROCEDURE — 84484 ASSAY OF TROPONIN QUANT: CPT | Performed by: INTERNAL MEDICINE

## 2019-08-09 RX ORDER — INSULIN GLARGINE 100 [IU]/ML
15 INJECTION, SOLUTION SUBCUTANEOUS EVERY MORNING
Status: DISCONTINUED | OUTPATIENT
Start: 2019-08-09 | End: 2019-08-10 | Stop reason: HOSPADM

## 2019-08-09 RX ORDER — DEXTROSE MONOHYDRATE 25 G/50ML
25 INJECTION, SOLUTION INTRAVENOUS ONCE
Status: COMPLETED | OUTPATIENT
Start: 2019-08-09 | End: 2019-08-09

## 2019-08-09 RX ADMIN — INSULIN GLARGINE 15 UNITS: 100 INJECTION, SOLUTION SUBCUTANEOUS at 09:24

## 2019-08-09 RX ADMIN — SODIUM CHLORIDE 9.5 UNITS/HR: 9 INJECTION, SOLUTION INTRAVENOUS at 02:32

## 2019-08-09 RX ADMIN — ASPIRIN 81 MG: 81 TABLET, COATED ORAL at 09:00

## 2019-08-09 RX ADMIN — BUSPIRONE HYDROCHLORIDE 7.5 MG: 7.5 TABLET ORAL at 09:01

## 2019-08-09 RX ADMIN — DEXTROSE AND SODIUM CHLORIDE 250 ML/HR: 5; 900 INJECTION, SOLUTION INTRAVENOUS at 02:45

## 2019-08-09 RX ADMIN — INSULIN LISPRO 4 UNITS: 100 INJECTION, SOLUTION INTRAVENOUS; SUBCUTANEOUS at 16:55

## 2019-08-09 RX ADMIN — INSULIN LISPRO 1 UNITS: 100 INJECTION, SOLUTION INTRAVENOUS; SUBCUTANEOUS at 11:52

## 2019-08-09 RX ADMIN — POTASSIUM & SODIUM PHOSPHATES POWDER PACK 280-160-250 MG 2 PACKET: 280-160-250 PACK at 05:12

## 2019-08-09 RX ADMIN — METOPROLOL TARTRATE 25 MG: 25 TABLET ORAL at 09:00

## 2019-08-09 RX ADMIN — INSULIN LISPRO 2 UNITS: 100 INJECTION, SOLUTION INTRAVENOUS; SUBCUTANEOUS at 09:25

## 2019-08-09 RX ADMIN — POTASSIUM CHLORIDE 20 MEQ: 200 INJECTION, SOLUTION INTRAVENOUS at 02:37

## 2019-08-09 RX ADMIN — Medication 800 MG: at 02:37

## 2019-08-09 RX ADMIN — ENOXAPARIN SODIUM 40 MG: 40 INJECTION SUBCUTANEOUS at 08:59

## 2019-08-09 RX ADMIN — Medication 800 MG: at 09:01

## 2019-08-09 RX ADMIN — BUSPIRONE HYDROCHLORIDE 7.5 MG: 7.5 TABLET ORAL at 18:16

## 2019-08-09 RX ADMIN — DILTIAZEM HYDROCHLORIDE 30 MG: 30 TABLET, FILM COATED ORAL at 06:01

## 2019-08-09 RX ADMIN — INSULIN LISPRO 2 UNITS: 100 INJECTION, SOLUTION INTRAVENOUS; SUBCUTANEOUS at 21:48

## 2019-08-09 RX ADMIN — DEXTROSE 50 % IN WATER (D50W) INTRAVENOUS SYRINGE 25 ML: at 03:12

## 2019-08-09 NOTE — PHYSICAL THERAPY NOTE
Physical Therapy Evaluation     Patient's Name: Chayo Bernal    Admitting Diagnosis  DKA (diabetic ketoacidoses) ([facility] ) [E13 10]  Vomiting [R11 10]  Rapid atrial fibrillation ([facility] ) [I48 91]  Allergic reaction, initial encounter [T78 40XA]    Problem List  Patient Active Problem List   Diagnosis    Benign essential hypertension    Bilateral hydronephrosis    Hyperlipemia    Left thyroid nodule    Lupus anticoagulant positive    Non Hodgkin's lymphoma ([facility] )    Systemic lupus erythematosus ([facility] )    Type 2 diabetes mellitus without complication, without long-term current use of insulin ([facility] )    Polyarthralgia    DKA (diabetic ketoacidoses) ([facility] )    Rapid atrial fibrillation (HCC)    Allergic reaction       Past Medical History  Past Medical History:   Diagnosis Date    Bilateral hydronephrosis 1/24/2018    Hypertension     Left thyroid nodule 1/24/2018    Lupus ([facility] )     Lupus anticoagulant positive 1/24/2018    Non Hodgkin's lymphoma ([facility] ) 11/2/2016    Panic attack        Past Surgical History  History reviewed  No pertinent surgical history  08/09/19 0900   Note Type   Note type Eval only   Pain Assessment   Pain Assessment No/denies pain   Pain Score No Pain   Home Living   Type of [address] One level;Performs ADLs on one level; Able to live on main level with bedroom/bathroom;Stairs to enter with rails  (2 JJ->porch->1 JJ;full flight into basement)   Bathroom Shower/Tub Walk-in shower  (in basement)   Jessa   (PTA, pt  did not utilize an AD or DME )   Prior Function   Level of [place] Independent with ADLs and functional mobility   Lives With Alone   ADL Assistance Independent   IADLs Independent   Falls in the last 6 months 1 to 4  (x 1)   Vocational Full time employment   Restrictions/Precautions   Other Precautions Multiple lines;Telemetry; Fall Risk   General   Family/Caregiver Present No Cognition   Overall Cognitive Status WFL   Arousal/Participation Alert   Attention Within functional limits   Orientation Level Oriented X4   Memory Within functional limits   Following Commands Follows all commands and directions without difficulty   RUE Assessment   RUE Assessment WFL   LUE Assessment   LUE Assessment WFL   RLE Assessment   RLE Assessment X   Strength RLE   R Hip Flexion 4-/5   R Knee Extension 4-/5   R Ankle Dorsiflexion 4-/5   LLE Assessment   LLE Assessment X   Strength LLE   L Hip Flexion 4-/5   L Knee Extension 4-/5   L Ankle Dorsiflexion 4-/5   Coordination   Movements are Fluid and Coordinated 1   Light Touch   RLE Light Touch Grossly intact   LLE Light Touch Grossly intact   Sharp/Dull   RLE Sharp/Dull Grossly intact   LLE Sharp/Dull Grossly intact   Bed Mobility   Additional Comments DNT bed mobility as pt  was resting on bedside upon arrival w/nursing staff present w/medications  Transfers   Sit to Stand 5  Supervision   Additional items Assist x 1;Bedrails   Stand to Sit 5  Supervision   Additional items Assist x 1;Bedrails   Stand pivot 5  Supervision   Additional items Assist x 1   Ambulation/Elevation   Gait pattern Narrow ANU; Short stride   Gait Assistance 5  Supervision   Additional items Assist x 1; Tactile cues  (occasional TC's to maintain stability w/directional changes)   Assistive Device None   Distance 20 feet x 2   Stair Management Assistance Not tested   Balance   Static Sitting Normal   Dynamic Sitting Good   Static Standing Fair +   Dynamic Standing Fair +   Ambulatory Fair +   Endurance Deficit   Endurance Deficit Yes   Endurance Deficit Description HR 95, exhibited slight fatigue upon conclusion, resolved w/rest   Activity Tolerance   Activity Tolerance Patient tolerated treatment well   Nurse Made Aware yes, Lucita Walton RN   Assessment   Prognosis Good   Problem List Decreased strength;Decreased endurance; Impaired balance;Decreased mobility;Obesity   Assessment Pt is 54 y o  female seen for PT evaluation s/p admit to [facility] ICU  on 8/8/2019 w/ DKA (diabetic ketoacidoses) ([facility] )  PT consulted to assess pt's functional mobility and d/c needs  Order placed for PT eval and tx, w/ activity as tolerated order  Comorbidities affecting pt's physical performance at time of assessment include: weakness, DKA, HTN, DM, rapid A-fib, polyarthralgia, systemic lupus erythematosus  PTA, pt was independent w/ all functional mobility w/ o AD  Personal factors affecting pt at time of IE include: inability to navigate community distances, unable to perform dynamic tasks in community, positive fall history and inability to perform IADLs  Please find objective findings from PT assessment regarding body systems outlined above with impairments and limitations including weakness, impaired balance, decreased endurance, gait deviations, decreased safety awareness, impaired judgement and fall risk  The following objective measures performed on IE also reveal limitations: Barthel Index: 60/100  Pt's clinical presentation is currently unstable/unpredictable  Pt to benefit from continued PT tx to address deficits as defined above and maximize level of functional independent mobility and consistency  From PT/mobility standpoint, recommendation at time of d/c would be anticipate no needs  Goals   Patient Goals go home today   LTG Expiration Date 08/16/19   Long Term Goal #1 Patient will complete transfers independently to decrease risk of falls, facilitate upright standing posture  BLE strength to greater than/equal to 4/5 gross musculature to increase ability to safely transfer, control descent to chair  Patient will exhibit increase dynamic ambulatory balance to Good for 250 feet w/o AD independently  to improve ability to mobilize to toilet, chair and decrease risk for additional medical complications    Patient will exhibit good self monitoring and ability to follow 2 step commands to increase complexity of tasks and resume ADL's without LOB  Increase patient's dynamic standing balance to Good without LOB or perturbations in preparation of gait activities  Treatment Day 0   Plan   Treatment/Interventions Functional transfer training;LE strengthening/ROM; Therapeutic exercise; Endurance training;Bed mobility;Gait training;Spoke to nursing  (&neuro re-education w/balance training)   PT Frequency Other (Comment)  (3-5x/wk)   Recommendation   Recommendation Home independently   PT - OK to Discharge Yes  (if medically stable)   Additional Comments Upon conclusion, pt  was sitting on bedside w/all needs within reach     Barthel Index   Feeding 10   Bathing 0   Grooming Score 5   Dressing Score 5   Bladder Score 10   Bowels Score 10   Toilet Use Score 10   Transfers (Bed/Chair) Score 10   Mobility (Level Surface) Score 0   Stairs Score 0   Barthel Index Score 60       Anice Kin, PT

## 2019-08-09 NOTE — NURSING NOTE
Through the night when pt's BP decreased to 06G systolic - decreased cardizem and after speaking with JAIRO YU the cardizem drip was d/c'd at approx 0016  Also spoke with Bryanna YU regarding pt's bld sugar and the insulin drip several times through the night   Bryanna YU was made aware of the bld sugars and titration of the insulin drip

## 2019-08-09 NOTE — ASSESSMENT & PLAN NOTE
Resolved  Likely related to overall illness  This does not necessarily portend recurrent atrial fibrillation  She is notably against considering intense anticoagulation  I am going to have her undergo a several week event recorder after discharge and then see her in the office to review  In the meantime beta-blocker is started

## 2019-08-09 NOTE — UTILIZATION REVIEW
Notification of Inpatient Admission/Inpatient Authorization Request  This is a Notification of Inpatient Admission/Request for Inpatient Authorization for our facility [facility]  Be advised that this patient was admitted to our facility under Inpatient Status  Please contact the Utilization Review Department where the patient is receiving care services for additional admission information  Place of Service Code: 24   Place of Service Name: Inpatient Hospital  Presentation Date & Time: 8/8/2019 10:28 AM  Inpatient Admission Date & Time: 8/8/19 1209  Discharge Date & Time: No discharge date for patient encounter  Discharge Disposition (if discharged): Final discharge disposition not confirmed  Attending Physician: Michelle Whitman Md [[id number]]   Attending Physician:  JOSE Pitt  Specialty- Internal Medicine  [address] [place] [address]  Phone 1: [phone]  Fax: [phone]   Admission Orders (From admission, onward)     Ordered        08/08/19 1209  Inpatient Admission (expected length of stay for this patient Order details is greater than two midnights)  Once                   Facility: [address] [place] [address] Utilization Review Department  Phone: [phone]; Fax [phone]  [email or web address]  ATTENTION: Please call with any questions or concerns to [phone]  and carefully listen to the prompts so that you are directed to the right person  Send all requests for admission clinical reviews, approved or denied determinations and any other requests to fax [phone]   All voicemails are confidential

## 2019-08-09 NOTE — NURSING NOTE
Report given to Usama Gill, receiving RN, Tele floor  Patient and all belongings transferred to floor via w/c  Patient and VS stable

## 2019-08-09 NOTE — CONSULTS
Consult- Tyrell Arzate 1965, 53 y o  female MRN: [id number]    Unit/Bed#: ICU 03 Encounter: [id number]    Primary Care Provider: Jamil Castellano DO   Date and time admitted to hospital: 8/8/2019 10:28 AM      Inpatient consult to Cardiology  Consult performed by: Brittany Santos MD  Consult ordered by: Gómez Hsu MD          Rapid atrial fibrillation [facility])  Assessment & Plan  Resolved  Likely related to overall illness  This does not necessarily portend recurrent atrial fibrillation  She is notably against considering intense anticoagulation  I am going to have her undergo a several week event recorder after discharge and then see her in the office to review  In the meantime beta-blocker is started  Benign essential hypertension  Assessment & Plan  Lisinopril on hold but will be restarted when her volume status is proper  * DKA (diabetic ketoacidoses) ([facility] [address] )  Assessment & Plan  Resolving  Other summary comments:   Have that atrial fibrillation has resolved I have ordered an outpatient event recorder and I will see her in follow-up to review and to make sure that more intense anticoagulation should not be stressed  HPI: Tyrell Arzate is a 53y o  year old female who presented with syncope  She was found to be in diabetic ketoacidosis and as well atrial fibrillation with a rapid rate  Her skin has been intensely flushed very recently  She did not report polyuria  In any event since hospital admission her atrial fibrillation has resolved and glucose has come under control with the use of insulin  I am asked to comment further  There is no history of palpitations  There is no prior history of syncope or near syncope  She describes a neurologic event several years ago and was told that something in her brain was similar to the red patches on her left face    A neurologist saw her and did not recommend anticoagulation but after discharge a primary care physician had her transiently take Aggrenox  Also of note is a history of non-Hodgkin's lymphoma with chemotherapy but no radiation  EKG:   Sinus rhythm at 95 beats per minute  MOST  RECENT CARDIAC IMAGING:   Echocardiogram 8/9/2019:  Ejection fraction 50%  No segmental wall motion abnormalities  Mild mitral regurgitation  Review of Systems: a 10 point review of systems was conducted and is negative except for as mentioned in the HPI or as below  Historical Information   Past Medical History:   Diagnosis Date    Bilateral hydronephrosis 1/24/2018    Hypertension     Left thyroid nodule 1/24/2018    Lupus ([facility] )     Lupus anticoagulant positive 1/24/2018    Non Hodgkin's lymphoma ([facility] ) 11/2/2016    Panic attack      History reviewed  No pertinent surgical history  Social History     Substance and Sexual Activity   Alcohol Use No    Frequency: Never    Drinks per session: Patient refused    Binge frequency: Never     Social History     Substance and Sexual Activity   Drug Use No     Social History     Tobacco Use   Smoking Status Never Smoker   Smokeless Tobacco Never Used       Family History:   Negative for early CAD  Meds/Allergies   all current active meds have been reviewed  Medications Prior to Admission   Medication    busPIRone (BUSPAR) 15 mg tablet    colesevelam (WELCHOL) 625 mg tablet    gemfibrozil (LOPID) 600 mg tablet    lisinopril (ZESTRIL) 20 mg tablet    metFORMIN (GLUCOPHAGE) 1000 MG tablet    Semaglutide (OZEMPIC) 0 25 or 0 5 MG/DOSE SOPN    ASPIRIN 81 PO    Blood Glucose Monitoring Suppl (FREESTYLE LITE) ASHLEY    FREESTYLE TEST STRIPS test strip    glucose monitoring kit (FREESTYLE) monitoring kit       Allergies   Allergen Reactions    Clam Shell      Clam family    Penicillins Rash       Objective   Vitals: Blood pressure 105/56, pulse 93, temperature 98 3 °F (36 8 °C), temperature source Tympanic, resp   rate 20, height 4' 11" (1 499 m), weight 98 1 kg (216 lb 4 8 oz), SpO2 91 % , Body mass index is 43 69 kg/m² ,   Orthostatic Blood Pressures      Most Recent Value   Blood Pressure  105/56 filed at 08/09/2019 0800   Patient Position - Orthostatic VS  Lying filed at 08/09/2019 1359          Systolic (30JGX), NUU:046 , Min:63 , TKA:646     Diastolic (19UWC), FDT:63, Min:44, Max:100              Physical Exam:    General:  Normal appearance in no distress  Eyes:  Anicteric  Patch on left face consistent with AV malformation  Oral mucosa:  Moist   Neck:  No JVD  Carotid upstrokes are brisk without bruits  No masses  Chest:  Clear to auscultation and percussion  Cardiac:  Normal PMI  Normal S1 and S2  No murmur gallop or rub  Abdomen:  Soft and nontender  No palpable organomegaly or aortic enlargement  Extremities:  No peripheral edema  Musculoskeletal:  Symmetric  Vascular:  Femoral pulses are brisk without bruits  Popliteal  pulses are intact bilaterally  Pedal pulses are intact  Neuro:  Grossly symmetric  Psych:  Alert and oriented x3  Lab Results:     Troponins:   Results from last 7 days   Lab Units 08/09/19  0130 08/08/19  2013 08/08/19  1706   TROPONIN I ng/mL 0 06* 0 06* 0 03     BNP:   Results from last 6 Months   Lab Units 08/08/19  1043   BNP pg/mL 19       CBC :   Results from last 7 days   Lab Units 08/09/19  0547 08/08/19  1042   WBC Thousand/uL 16 60* 19 60*   HEMOGLOBIN g/dL 12 6 18 6*   HEMATOCRIT % 37 1* 57 5*   MCV fL 90 96   PLATELETS Thousands/uL 371 572*     TSH:     CMP:   Results from last 7 days   Lab Units 08/09/19  0547 08/09/19  0115  08/08/19  1043   POTASSIUM mmol/L 4 5 4 1   < > 4 4   CHLORIDE mmol/L 108* 109*   < > 89*   CO2 mmol/L 20* 23   < > 17*   BUN mg/dL 16 19   < > 25   CREATININE mg/dL 0 70 0 69   < > 1 26*   AST U/L  --   --   --  22   ALT U/L  --   --   --  28   EGFR ml/min/1 73sq m 98 98   < > 48    < > = values in this interval not displayed       Lipid Profile:   Results from last 7 days   Lab Units 08/05/19  0741 TRIGLYCERIDES mg/dL 222*   HDL mg/dL 47     Coags:   Results from last 7 days   Lab Units 08/08/19  1043   INR  0 93

## 2019-08-09 NOTE — PLAN OF CARE
Problem: PHYSICAL THERAPY ADULT  Goal: Performs mobility at highest level of function for planned discharge setting  See evaluation for individualized goals  Description  Treatment/Interventions: Functional transfer training, LE strengthening/ROM, Therapeutic exercise, Endurance training, Bed mobility, Gait training, Spoke to nursing(&neuro re-education w/balance training)     See flowsheet documentation for full assessment, interventions and recommendations  Emi Miller, PT     Note:   Prognosis: Good  Problem List: Decreased strength, Decreased endurance, Impaired balance, Decreased mobility, Obesity  Assessment: Pt is 53 y o  female seen for PT evaluation s/p admit to [facility] ICU  on 8/8/2019 w/ DKA (diabetic ketoacidoses) ([facility] [address] )  PT consulted to assess pt's functional mobility and d/c needs  Order placed for PT eval and tx, w/ activity as tolerated order  Comorbidities affecting pt's physical performance at time of assessment include: weakness, DKA, HTN, DM, rapid A-fib, polyarthralgia, systemic lupus erythematosus  PTA, pt was independent w/ all functional mobility w/ o AD  Personal factors affecting pt at time of IE include: inability to navigate community distances, unable to perform dynamic tasks in community, positive fall history and inability to perform IADLs  Please find objective findings from PT assessment regarding body systems outlined above with impairments and limitations including weakness, impaired balance, decreased endurance, gait deviations, decreased safety awareness, impaired judgement and fall risk  The following objective measures performed on IE also reveal limitations: Barthel Index: 60/100  Pt's clinical presentation is currently unstable/unpredictable  Pt to benefit from continued PT tx to address deficits as defined above and maximize level of functional independent mobility and consistency   From PT/mobility standpoint, recommendation at time of d/c would be anticipate no needs  Recommendation: Home independently     PT - OK to Discharge: Yes(if medically stable)    See flowsheet documentation for full assessment     Chuy Soliz, PT

## 2019-08-09 NOTE — PLAN OF CARE
Problem: Potential for Falls  Goal: Patient will remain free of falls  Description  INTERVENTIONS:  - Assess patient frequently for physical needs  -  Identify cognitive and physical deficits and behaviors that affect risk of falls    -  [place] fall precautions as indicated by assessment   - Educate patient/family on patient safety including physical limitations  - Instruct patient to call for assistance with activity based on assessment  - Modify environment to reduce risk of injury  - Consider OT/PT consult to assist with strengthening/mobility  Outcome: Progressing     Problem: SAFETY ADULT  Goal: Maintain or return to baseline ADL function  Description  INTERVENTIONS:  -  Assess patient's ability to carry out ADLs; assess patient's baseline for ADL function and identify physical deficits which impact ability to perform ADLs (bathing, care of mouth/teeth, toileting, grooming, dressing, etc )  - Assess/evaluate cause of self-care deficits   - Assess range of motion  - Assess patient's mobility; develop plan if impaired  - Assess patient's need for assistive devices and provide as appropriate  - Encourage maximum independence but intervene and supervise when necessary  ¯ Involve family in performance of ADLs  ¯ Assess for home care needs following discharge   ¯ Request OT consult to assist with ADL evaluation and planning for discharge  ¯ Provide patient education as appropriate  Outcome: Progressing     Problem: PAIN - ADULT  Goal: Verbalizes/displays adequate comfort level or baseline comfort level  Description  Interventions:  - Encourage patient to monitor pain and request assistance  - Assess pain using appropriate pain scale  - Administer analgesics based on type and severity of pain and evaluate response  - Implement non-pharmacological measures as appropriate and evaluate response  - Consider cultural and social influences on pain and pain management  - Notify physician/advanced practitioner if interventions unsuccessful or patient reports new pain  Outcome: Progressing

## 2019-08-09 NOTE — PROGRESS NOTES
Progress Note - Luis Clock 1965, 53 y o  female MRN: [id number]    Unit/Bed#: ICU 03 Encounter: [id number]    Primary Care Provider: Dwaine Crawford DO   Date and time admitted to hospital: 2019 10:28 AM        Allergic reaction  Assessment & Plan  · Unclear cause of patient's allergic reaction  · Patient received steroids/Benadryl in the ER  · Appears to have improved  · Will hold off on steroids at this point given patient's DKA  · Continue Benadryl as needed    Rapid atrial fibrillation (HCC)  Assessment & Plan  · No history of atrial fibrillation  · Cardiology seeing patient now  · Follow-up echocardiogram read  · Rates controlled with diltiazem    Type 2 diabetes mellitus without complication, without long-term current use of insulin ([facility] [address] )  Assessment & Plan  · Hold home meds for now  · We will transition to subcutaneous insulin as DKA has resolved  · Recommend outpatient endocrinology follow-up    Benign essential hypertension  Assessment & Plan  · Blood pressure reasonably controlled with current regimen    * DKA (diabetic ketoacidoses) ([address] )  Assessment & Plan  · Acidosis resolved  · Electrolytes repleted  · Transition to subcutaneous insulin        VTE Pharmacologic Prophylaxis: Pharmacologic: Heparin    Patient Centered Rounds: I have performed bedside rounds with nursing staff today  Discussions with Specialists or Other Care Team Provider: cardiology  Education and Discussions with Family / Patient: patient    Current Length of Stay: 1 day(s)    Current Patient Status: Inpatient   Certification Statement: The patient will continue to require additional inpatient hospital stay due to dka, afib    Discharge Plan:  Likely home tomorrow    Code Status: Level 3 - DNAR and DNI    Subjective:   Patient seen examined  No acute events overnight    Feels much better this morning    Objective:     Vitals:   Temp (24hrs), Av 9 °F (36 6 °C), Min:97 °F (36 1 °C), Max:99 3 °F (37 4 °C)    Temp: [97 °F (36 1 °C)-99 3 °F (37 4 °C)] 97 3 °F (36 3 °C)  HR:  [] 90  Resp:  [11-40] 18  BP: ()/() 108/69  SpO2:  [89 %-98 %] 94 %  Body mass index is 43 69 kg/m²  Input and Output Summary (last 24 hours): Intake/Output Summary (Last 24 hours) at 8/9/2019 0840  Last data filed at 8/9/2019 0615  Gross per 24 hour   Intake 5660 43 ml   Output 2400 ml   Net 3260 43 ml       Physical Exam:     Physical Exam   Constitutional: She is oriented to person, place, and time  She appears well-developed and well-nourished  HENT:   Head: Normocephalic and atraumatic  Eyes: Pupils are equal, round, and reactive to light  EOM are normal    Neck: Normal range of motion  Neck supple  Cardiovascular: Normal rate and regular rhythm  Pulmonary/Chest: Effort normal and breath sounds normal    Abdominal: Soft  Bowel sounds are normal    Obese   Musculoskeletal: Normal range of motion  She exhibits no edema  Neurological: She is alert and oriented to person, place, and time  Skin: Skin is warm  Capillary refill takes less than 2 seconds  Psychiatric: She has a normal mood and affect  Her behavior is normal  Judgment and thought content normal    Nursing note and vitals reviewed  Additional Data:     Labs:    Results from last 7 days   Lab Units 08/09/19  0547   WBC Thousand/uL 16 60*   HEMOGLOBIN g/dL 12 6   HEMATOCRIT % 37 1*   PLATELETS Thousands/uL 371   NEUTROS PCT % 83*   LYMPHS PCT % 11*   MONOS PCT % 4   EOS PCT % 1     Results from last 7 days   Lab Units 08/09/19  0547  08/08/19  1043   POTASSIUM mmol/L 4 5   < > 4 4   CHLORIDE mmol/L 108*   < > 89*   CO2 mmol/L 20*   < > 17*   BUN mg/dL 16   < > 25   CREATININE mg/dL 0 70   < > 1 26*   CALCIUM mg/dL 7 4*   < > 9 4   ALK PHOS U/L  --   --  129   ALT U/L  --   --  28   AST U/L  --   --  22    < > = values in this interval not displayed       Results from last 7 days   Lab Units 08/08/19  1043   INR  0 93     Results from last 7 days   Lab Units 08/09/19  0702 08/09/19  8396 08/09/19  0504 08/09/19  0405 08/09/19  0302 08/09/19  0201 08/09/19  0104 08/09/19  0004 08/08/19  2305 08/08/19  2211 08/08/19  2108 08/08/19 2011   POC GLUCOSE mg/dl 162* 131 89 97 69 70 89 123 133 174* 239* 316*           * I Have Reviewed All Lab Data Listed Above  * Additional Pertinent Lab Tests Reviewed: [facility] Admission  Reviewed    Imaging:  Imaging Reports Reviewed Today Include: n/a    Recent Cultures (last 7 days):           Last 24 Hours Medication List:     Current Facility-Administered Medications:  acetaminophen 650 mg Oral Q6H PRN Gita Rodgers MD   aspirin 81 mg Oral Daily Gita Rodgers MD   busPIRone 7 5 mg Oral BID Gita Rodgers MD   diltiazem 30 mg Oral Q6H [address] DESHAUN Eckert   diphenhydrAMINE 25 mg Intravenous Q6H PRN Gita Rodgers MD   enoxaparin 40 mg Subcutaneous Daily Gita Rodgers MD   insulin glargine 15 Units Subcutaneous Wilma Morocho MD   insulin lispro 1-5 Units Subcutaneous HS Corry Tadeo MD   insulin lispro 1-6 Units Subcutaneous TID Dianelys Nick MD   magnesium oxide 800 mg Oral Once Gita Rodgers MD   ondansetron 4 mg Intravenous Q6H PRN Gita Rodgers MD   sodium phosphate (21 mmol) infusion 250 mL 12 mmol Intravenous Once Gita Rodgers MD        Today, Patient Was Seen By: Corry Tadeo MD    ** Please Note: Dictation voice to text software may have been used in the creation of this document   **

## 2019-08-09 NOTE — UTILIZATION REVIEW
Initial Clinical Review    Admission: Date/Time/Statement: 8/8/19 @ [address] [facility] This Encounter   Procedures    Inpatient Admission (expected length of stay for this patient Order details is greater than two midnights)     Standing Status:   Standing     Number of Occurrences:   1     Order Specific Question:   Admitting Physician     Answer:   Timmy Menendez     Order Specific Question:   Level of Care     Answer:   Critical Care [15]     Order Specific Question:   Estimated length of stay     Answer:   More than 2 Midnights     Order Specific Question:   Certification     Answer:   I certify that inpatient services are medically necessary for this patient for a duration of greater than two midnights  See H&P and MD Progress Notes for additional information about the patient's course of treatment  ED Arrival Information     Expected Arrival Acuity Means of Arrival Escorted By Service Admission Type    - 8/8/2019 09:49 [facility]gent [facility]In [facility] Hospitalist Emergency    Arrival Complaint    Rash, Vomiting, Dizziness        Chief Complaint   Patient presents with    Vomiting     Assessment/Plan: 54 y/o female presents to ED from home with dizziness, lightheadedness, nausea, vomiting, rash throughout entire body  On exam, Afib HR 140s, rash noted with diffuse erythema and itching  In ED, pt found to be in DKA and Afib  Insulin gtt and cardizem gtt started  Admitted as inpatient to Critical Care due to DKA, rapid Afib, allergic reaction  Continue insulin gtt  Continue IVF  Clear liquid diet  No hx Afib  Continue cardizem gtt  Echo  Cardiology consult  Unclear cause of allergic reaction  Received steroids/benadryl in ED with improvement  Continue prn benadryl  8/8 General surgery consult for vascular access  RIJ TLC inserted  8/9 Cardiology consult:  Now in sinus rhythm  Event recorder for several weeks after d/c  Beta blocker started    Pt against anticoagulation  8/9 Per attending, allergic reaction appears to have improved  Acidosis resolved  Transition to SQ insulin      ED Triage Vitals   Temperature Pulse Respirations Blood Pressure SpO2   08/08/19 1012 08/08/19 1012 08/08/19 1012 08/08/19 1012 08/08/19 1012   (!) 97 °F (36 1 °C) 74 18 (!) 63/47 95 %      Temp Source Heart Rate Source Patient Position - Orthostatic VS BP Location FiO2 (%)   08/08/19 1012 08/08/19 1310 08/08/19 1012 08/08/19 1012 --   Temporal Monitor Sitting Left arm       Pain Score       08/08/19 1012       No Pain        Wt Readings from Last 1 Encounters:   08/09/19 98 1 kg (216 lb 4 3 oz)     Additional Vital Signs:   08/09/19 1100  84 18 101/59 92 %    08/09/19 1000  84 23Abnormal  109/69 92 %    08/09/19 0900  88 24Abnormal  114/79     08/09/19 0800 98 3 °F (36 8 °C) 93 20 105/56 91 %    08/09/19 0700  91 22 111/65 93 %    08/09/19 0601  90 18 108/69 94 % Nasal cannula 2L   08/09/19 0500  83 20 103/58 93 %    08/09/19 0400  82 16 83/48Abnormal  94 % Nasal cannula 2L   08/09/19 0300  82 15 75/49Abnormal  93 %    08/09/19 0200  84 19 89/54Abnormal  92 %    08/09/19 0100  85 20 87/55Abnormal  89 %Abnormal     08/09/19 0000  85 15 78/44Abnormal  92 % None (Room air)   08/08/19 2300  85 20 95/56 91 %    08/08/19 2200  85 19 93/50 93 %    08/08/19 2100  91 22 104/56 90 %    08/08/19 2000 99 3 °F (37 4 °C) 90 22 108/59 91 % None (Room air)    08/08/19 1900  93 24Abnormal  102/53 93 %    08/08/19 1830  101 22 102/58 93 %    08/08/19 1815  102 23Abnormal   94 %    08/08/19 1800  100 17 129/100 93 %    08/08/19 1730  99 24Abnormal  114/59 94 %    08/08/19 1715  104 26Abnormal   93 %    08/08/19 1700  107Abnormal  24Abnormal  105/55 92 %    08/08/19 1645  111Abnormal  23Abnormal   94 %    08/08/19 1630  110Abnormal  26Abnormal  112/63 91 %    08/08/19 1615  108Abnormal  40Abnormal   93 %    08/08/19 1600 98 6 °F (37 °C) 110Abnormal 24Abnormal  103/59 92 % Nasal cannula 2L   08/08/19 1545  119Abnormal  35Abnormal   92 %    08/08/19 1530  104 28Abnormal  98/59 92 %    08/08/19 1515  104 11Abnormal  101/60 90 %    08/08/19 1500  108Abnormal  27Abnormal  93/58 91 %    08/08/19 1445  105 25Abnormal  87/54Abnormal  93 %    08/08/19 1430  109Abnormal  25Abnormal  91/53 92 %    08/08/19 1415  112Abnormal  27Abnormal  95/52 91 %    08/08/19 1400  115Abnormal  29Abnormal  103/60 91 %    08/08/19 1345  116Abnormal  35Abnormal  106/58 91 %    08/08/19 1330  119Abnormal  26Abnormal  111/62 92 % Nasal cannula 2L   08/08/19 1315  124Abnormal  38Abnormal  127/67 93 % Nasal cannula 2L   08/08/19 1310 98 1 °F (36 7 °C) 124Abnormal  18 127/67 93 % Nasal cannula 2L   08/08/19 1230  137Abnormal  26Abnormal  122/65 97 %    08/08/19 1220  133Abnormal  24Abnormal  126/67 98 %    08/08/19 1200  152Abnormal  21 122/65 97 %    08/08/19 1135  154Abnormal  31Abnormal  85/59Abnormal  97 %    08/08/19 1130  149Abnormal  27Abnormal  86/60Abnormal  98 %    08/08/19 1100  139Abnormal  26Abnormal  125/55 97 %    08/08/19 1055  142Abnormal  26Abnormal  137/62 97 %    08/08/19 1035  145Abnormal  25Abnormal  70/50Abnormal  89 %Abnormal         Pertinent Labs/Diagnostic Test Results:   Results from last 7 days   Lab Units 08/09/19  0547 08/08/19  1042   WBC Thousand/uL 16 60* 19 60*   HEMOGLOBIN g/dL 12 6 18 6*   HEMATOCRIT % 37 1* 57 5*   PLATELETS Thousands/uL 371 572*   NEUTROS ABS Thousands/µL 13 80* 17 30*     Results from last 7 days   Lab Units 08/09/19  0547 08/09/19  0115 08/08/19  2109 08/08/19  1706 08/08/19  1043   SODIUM mmol/L 137 137 132* 129* 128*   POTASSIUM mmol/L 4 5 4 1 3 2* 4 2 4 4   CHLORIDE mmol/L 108* 109* 102 99 89*   CO2 mmol/L 20* 23 19* 21 17*   ANION GAP mmol/L 9 5 11 9 22*   BUN mg/dL 16 19 21 22 25   CREATININE mg/dL 0 70 0 69 0 83 0 91 1 26*   EGFR ml/min/1 73sq m 98 98 80 71 48   CALCIUM mg/dL 7 4* 7 5* 7 2* 7 6* 9 4   MAGNESIUM mg/dL 2 0 1 9 1 8* 1 5*  --    PHOSPHORUS mg/dL 2 7* 2 4* 2 4* 2 5*  --      Results from last 7 days   Lab Units 08/08/19  1043   AST U/L 22   ALT U/L 28   ALK PHOS U/L 129   TOTAL PROTEIN g/dL 7 6   ALBUMIN g/dL 4 3   TOTAL BILIRUBIN mg/dL 0 70     Results from last 7 days   Lab Units 08/09/19  1151 08/09/19  0923 08/09/19  0843 08/09/19  0702 08/09/19  0608 08/09/19  0504 08/09/19  0405 08/09/19  0302 08/09/19  0201 08/09/19  0104   POC GLUCOSE mg/dl 188* 219* 198* 162* 131 89 97 69 70 89     Results from last 7 days   Lab Units 08/09/19  0547 08/09/19  0115 08/08/19  2109 08/08/19  1706 08/08/19  1043   GLUCOSE RANDOM mg/dL 98 86 273* 354* >700*     Results from last 7 days   Lab Units 08/08/19  1220   PH ART  7 280*   PCO2 ART mm Hg 26 9*   PO2 ART mm Hg 145 0*   HCO3 ART mmol/L 12 3*   BASE EXC ART mmol/L -12 9*   O2 CONTENT ART mL/dL 21 2   O2 HGB, ARTERIAL % 97 2   ABG SOURCE  Radial, Right     Results from last 7 days   Lab Units 08/09/19  0130 08/08/19 2013 08/08/19  1706 08/08/19  1043   TROPONIN I ng/mL 0 06* 0 06* 0 03 <0 03     Results from last 7 days   Lab Units 08/08/19  1043   PROTIME seconds 10 8   INR  0 93   PTT seconds 34     Results from last 7 days   Lab Units 08/09/19  0548 08/08/19  1218   PROCALCITONIN ng/ml 1 79* 0 42*     Results from last 7 days   Lab Units 08/08/19 2013 08/08/19  1706 08/08/19  1218   LACTIC ACID mmol/L 1 9 3 3* 4 9*     Results from last 7 days   Lab Units 08/08/19  1043   BNP pg/mL 19     Beta Hydroxybutyrate    Ref Range & Units 8/8/19 1157   BETA-HYDROXYBUTYRATE <0 6 mmol/L 2 3High             8/8 EKG:  Atrial fibrillation with rapid ventricular response; 147 bpm  Nonspecific ST abnormality    8/8 CXR:  No acute cardiopulmonary disease  8/9 Echo:  LEFT VENTRICLE:  Systolic function was at the lower limits of normal  Ejection fraction was estimated to be 50 %  There were no regional wall motion abnormalities     RIGHT VENTRICLE:  The size was at the upper limits of normal    MITRAL VALVE:  There was mild regurgitation    TRICUSPID VALVE:  There was mild regurgitation      ED Treatment:   Medication Administration from 08/08/2019 0949 to 08/08/2019 1301       Date/Time Order Dose Route Action     08/08/2019 1034 diphenhydrAMINE (BENADRYL) injection 25 mg 25 mg Intravenous Given     08/08/2019 1041 famotidine (PEPCID) injection 20 mg 20 mg Intravenous Given     08/08/2019 1035 methylPREDNISolone sodium succinate (Solu-MEDROL) injection 125 mg 125 mg Intravenous Given     08/08/2019 1038 EPINEPHrine PF (ADRENALIN) 1 mg/mL injection 0 3 mg 0 3 mg Subcutaneous Given     08/08/2019 1033 sodium chloride 0 9 % bolus 1,000 mL 1,000 mL Intravenous New Bag     08/08/2019 1100 diltiazem (CARDIZEM) injection 10 mg 10 mg Intravenous Given     08/08/2019 1145 sodium chloride 0 9 % bolus 1,000 mL 1,000 mL Intravenous New Bag     08/08/2019 1227 insulin regular (HumuLIN R,NovoLIN R) injection 9 Units 9 Units Intravenous Given     08/08/2019 1226 insulin regular (HumuLIN R,NovoLIN R) 1 Units/mL in sodium chloride 0 9 % 100 mL infusion 9 5 Units/hr Intravenous New Bag        Past Medical History:   Diagnosis Date    Bilateral hydronephrosis 1/24/2018    Hypertension     Left thyroid nodule 1/24/2018    Lupus ([facility] )     Lupus anticoagulant positive 1/24/2018    Non Hodgkin's lymphoma ([facility] ) 11/2/2016    Panic attack      Present on Admission:   DKA (diabetic ketoacidoses) ([facility] )   Rapid atrial fibrillation ([facility] )   Allergic reaction   Benign essential hypertension   Type 2 diabetes mellitus without complication, without long-term current use of insulin ([facility] )   Hyperlipemia      Admitting Diagnosis: DKA (diabetic ketoacidoses) ([facility] ) [E13 10]  Vomiting [R11 10]  Rapid atrial fibrillation (HCC) [I48 91]  Allergic reaction, initial encounter [T78 40XA]  Age/Sex: 53 y o  female  Admission Orders:    Current Facility-Administered Medications:  acetaminophen 650 mg Oral Q6H PRN   aspirin 81 mg Oral Daily   busPIRone 7 5 mg Oral BID   diphenhydrAMINE 25 mg Intravenous Q6H PRN   enoxaparin 40 mg Subcutaneous Daily   insulin glargine 15 Units Subcutaneous QAM   insulin lispro 1-5 Units Subcutaneous HS   insulin lispro 1-6 Units Subcutaneous TID AC   metoprolol tartrate 25 mg Oral Q12H EUGENE   ondansetron 4 mg Intravenous Q6H PRN   sodium phosphate (21 mmol) infusion 250 mL 12 mmol Intravenous Once   insulin regular (HumuLIN R,NovoLIN R) 1 Units/mL in sodium chloride 0 9 % 100 mL infusion   Rate: 0 1-30 mL/hr Dose: 0 1-30 Units/hr  Freq: Continuous Route: IV  Last Dose: Stopped (08/09/19 0930)  Start: 08/08/19 1215 End: 08/09/19 0833  diltiazem (CARDIZEM) 125 mg in sodium chloride 0 9 % 125 mL infusion   Rate: 1-15 mL/hr Dose: 1-15 mg/hr  Freq: Titrated Route: IV  Last Dose: Stopped (08/09/19 0033)  Start: 08/08/19 1115 End: 08/09/19 0033  dextrose 5 % and sodium chloride 0 9 % infusion   Rate: 250 mL/hr Dose: 250 mL/hr  Freq: Continuous Route: IV  Last Dose: Stopped (08/09/19 0930)  Start: 08/08/19 2230 End: 08/09/19 0833    IP CONSULT TO CARDIOLOGY  IP CONSULT TO ACUTE CARE SURGERY   VS  Diabetic diet  PT/OT    Network Utilization Review Department  Phone: [phone]; Fax [phone]  [email or web address]  ATTENTION: Please call with any questions or concerns to [phone]  and carefully listen to the prompts so that you are directed to the right person  Send all requests for admission clinical reviews, approved or denied determinations and any other requests to fax [phone]   All voicemails are confidential

## 2019-08-09 NOTE — PLAN OF CARE
Problem: OCCUPATIONAL THERAPY ADULT  Goal: Performs self-care activities at highest level of function for planned discharge setting  See evaluation for individualized goals  Description  Treatment Interventions: ADL retraining, Functional transfer training, Endurance training, Equipment evaluation/education, Neuromuscular reeducation, Energy conservation          See flowsheet documentation for full assessment, interventions and recommendations  Note:   Limitation: Decreased ADL status, Decreased Safe judgement during ADL, Decreased endurance, Decreased self-care trans, Decreased high-level ADLs     Assessment: Pt is a 53 y o  female who was admitted to [address] [place] [address] on 8/8/2019 with DKA (diabetic ketoacidoses) ([facility] )  At this time, patient is also affected by the comorbidities of: DM, HTN and history of rapid a-fib  Additionally, patient is affected by the following personal factors:steps to enter environment, limited home support, difficulty performing ADLS and difficulty performing IADLS   Orders placed for OT evaluation/treatment with up and OOB as tolerated orders  Prior to admission, Patient reporting independent with ADLs/IADLs, ambulatory with no AD, lives alone a one level house with a basement with 2 JJ  Patient drives and works full-time  Upon OT evaluation, patient requires modified independent  for UB ADLs and supervision/set-up for LB ADLs  Occupational performance is affected by the following deficits: decreased balance, decreased tolerance and decreased safety awareness  Based on the following information, patient would benefit from continued skilled OT treatment while in the hospital to address deficits and maximize level of functional independence with ADL's and functional mobility  Occupational Performance areas to address include: bathing/shower, toilet hygiene, dressing, medication management, functional mobility, community mobility and household maintenance   From OT standpoint, recommendation at time of d/c would be home independent       OT Discharge Recommendation: Home independent  OT - OK to Discharge: (Once medically cleared )     Mary Kay Granda, OT

## 2019-08-09 NOTE — OCCUPATIONAL THERAPY NOTE
Occupational Therapy Evaluation      Sidney Wayne    8/9/2019    Patient Active Problem List   Diagnosis    Benign essential hypertension    Bilateral hydronephrosis    Hyperlipemia    Left thyroid nodule    Lupus anticoagulant positive    Non Hodgkin's lymphoma ([facility] )    Systemic lupus erythematosus ([facility] )    Type 2 diabetes mellitus without complication, without long-term current use of insulin (HCC)    Polyarthralgia    DKA (diabetic ketoacidoses) (HCC)    Rapid atrial fibrillation (HCC)    Allergic reaction       Past Medical History:   Diagnosis Date    Bilateral hydronephrosis 1/24/2018    Hypertension     Left thyroid nodule 1/24/2018    Lupus ([facility] )     Lupus anticoagulant positive 1/24/2018    Non Hodgkin's lymphoma ([facility] ) 11/2/2016    Panic attack         08/09/19 0909   Note Type   Note type Eval only   Restrictions/Precautions   Weight Bearing Precautions Per Order No   Other Precautions Multiple lines; Fall Risk   Pain Assessment   Pain Assessment No/denies pain   Pain Score No Pain   Home Living   Type of [address] [place] [address] One level;Stairs to enter with rails; Able to live on main level with bedroom/bathroom  (2 JJ->porch->1 JJ;full flight into basement)   Bathroom Shower/Tub Walk-in shower  (in basement)   Bathroom Toilet Standard   Bathroom Equipment   (no DME at baseline )   [address]   (no AD at baseline )   Prior Function   Level of [place] Independent with ADLs and functional mobility   Lives With Alone   ADL Assistance Independent   IADLs Independent   Falls in the last 6 months 1 to 4  (x 1)   Vocational Full time employment   Lifestyle   Autonomy Patient reporting independent with ADLs/IADLs, ambulatory with no AD, lives alone a one level house with a basement with 2 JJ  Patient drives and works full-time      Service to Others works as a     Intrinsic Gratification enjoys caring for her 12 cats    ADL Eating Assistance 7  Independent   Grooming Assistance 7  Independent   UB Bathing Assistance 6  Modified Independent   LB Bathing Assistance 5  Supervision/Setup   UB Dressing Assistance 6  Modified independent   LB Dressing Assistance 5  Supervision/Setup   Toileting Assistance  6  Modified independent   Functional Assistance 5  Supervision/Setup   Bed Mobility   Additional Comments Pt seated at EOB prior to eval    Transfers   Sit to Stand 5  Supervision   Additional items Assist x 1;Verbal cues   Stand to Sit 5  Supervision   Additional items Assist x 1;Verbal cues   Functional Mobility   Functional Mobility 5  Supervision  (no AD)   Balance   Static Sitting Normal   Dynamic Sitting Good   Static Standing Good   Dynamic Standing Fair +   Activity Tolerance   Activity Tolerance Patient tolerated treatment well   Nurse Made Aware VISH Cardoso verbalized pt appropriate to see for therapy   RUE Assessment   RUE Assessment WFL   LUE Assessment   LUE Assessment WFL   Hand Function   Gross Motor Coordination Functional   Fine Motor Coordination Functional   Sensation   Light Touch No apparent deficits  (per pt report )   Cognition   Overall Cognitive Status [facility]   Arousal/Participation Alert; Cooperative   Attention Within functional limits   Orientation Level Oriented X4   Memory Within functional limits   Following Commands Follows all commands and directions without difficulty   Assessment   Limitation Decreased ADL status; Decreased Safe judgement during ADL;Decreased endurance;Decreased self-care trans;Decreased high-level ADLs   Assessment Pt is a 53 y o  female who was admitted to [address] [place] [address] on 8/8/2019 with DKA (diabetic ketoacidoses) ([facility] )  At this time, patient is also affected by the comorbidities of: DM, HTN and history of rapid a-fib   Additionally, patient is affected by the following personal factors:steps to enter environment, limited home support, difficulty performing ADLS and difficulty performing IADLS   Orders placed for OT evaluation/treatment with up and OOB as tolerated orders  Prior to admission, Patient reporting independent with ADLs/IADLs, ambulatory with no AD, lives alone a one level house with a basement with 2 JJ  Patient drives and works full-time  Upon OT evaluation, patient requires modified independent  for UB ADLs and supervision/set-up for LB ADLs  Occupational performance is affected by the following deficits: decreased balance, decreased tolerance and decreased safety awareness  Based on the following information, patient would benefit from continued skilled OT treatment while in the hospital to address deficits and maximize level of functional independence with ADL's and functional mobility  Occupational Performance areas to address include: bathing/shower, toilet hygiene, dressing, medication management, functional mobility, community mobility and household maintenance  From OT standpoint, recommendation at time of d/c would be home independent  Goals   Patient Goals to go home    Plan   Treatment Interventions ADL retraining;Functional transfer training; Endurance training;Equipment evaluation/education; Neuromuscular reeducation; Energy conservation   Goal Expiration Date 08/19/19   OT Frequency 3-5x/wk   Recommendation   OT Discharge Recommendation Home independent   OT - OK to Discharge   (Once medically cleared )   Barthel Index   Feeding 10   Bathing 0   Grooming Score 5   Dressing Score 5   Bladder Score 10   Bowels Score 10   Toilet Use Score 10   Transfers (Bed/Chair) Score 10   Mobility (Level Surface) Score 0   Stairs Score 0   Barthel Index Score 60     GOALS:      *ADL transfers with (I) for inc'd independence with ADLs/purposeful tasks    *UB ADL with (I) for inc'd independence with self cares    *LB ADL with (I) using AE prn for inc'd independence with self cares    *Toileting with (I) for clothing management and hygiene for return to PLOF with personal care    *Increase static stand balance to normal and dyn stand balance to normal for inc'd safety with standing purposeful tasks    *Increase stand tolerance x10 m for inc'd tolerance with standing purposeful tasks    *Participate in 10m UE therex to increase overall stamina/activity tolerance for purposeful tasks    *Bed mobility- (I) for inc'd independence to manage own comfort and initiate EOB & OOB purposeful tasks    *Patient will verbalize 3 safety awareness/ principles to prevent falls in the home setting  *Patient will verbalize and demonstrate use of energy conservation/deep breathing techniques and work simplification skills during functional activities with no verbal cues  *Patient will increase OOB/sitting tolerance to 2-4 hours per day to increase participation in self-care and leisure tasks with no s/s of exertion           Mago Gudino MS, OTR/L

## 2019-08-09 NOTE — ASSESSMENT & PLAN NOTE
· No history of atrial fibrillation  · Cardiology seeing patient now  · Follow-up echocardiogram read  · Rates controlled with diltiazem

## 2019-08-09 NOTE — ASSESSMENT & PLAN NOTE
· Hold home meds for now  · We will transition to subcutaneous insulin as DKA has resolved  · Recommend outpatient endocrinology follow-up

## 2019-08-09 NOTE — NURSING NOTE
Patient awake, alert and oriented, cooperative with care  Lungs clear  Respirations easy and regular  Patient tolerating po diet  Denies nausea nor pain Voids qs    Accu checks monitored AC and HS now, since Insulin drip D/C'd this am, as ordered

## 2019-08-09 NOTE — SOCIAL WORK
Will go home when medically cleared  Neighbor will transport  Denies need for any services  Discussed at interdisciplinary meeting

## 2019-08-10 VITALS
TEMPERATURE: 97.9 F | WEIGHT: 219.14 LBS | BODY MASS INDEX: 43.02 KG/M2 | HEART RATE: 89 BPM | HEIGHT: 60 IN | DIASTOLIC BLOOD PRESSURE: 82 MMHG | OXYGEN SATURATION: 96 % | RESPIRATION RATE: 18 BRPM | SYSTOLIC BLOOD PRESSURE: 140 MMHG

## 2019-08-10 LAB
ANION GAP SERPL CALCULATED.3IONS-SCNC: 6 MMOL/L (ref 4–13)
BUN SERPL-MCNC: 12 MG/DL (ref 7–25)
CALCIUM SERPL-MCNC: 7.9 MG/DL (ref 8.6–10.5)
CHLORIDE SERPL-SCNC: 107 MMOL/L (ref 98–107)
CO2 SERPL-SCNC: 25 MMOL/L (ref 21–31)
CREAT SERPL-MCNC: 0.67 MG/DL (ref 0.6–1.2)
ERYTHROCYTE [DISTWIDTH] IN BLOOD BY AUTOMATED COUNT: 13.7 % (ref 11.5–14.5)
GFR SERPL CREATININE-BSD FRML MDRD: 99 ML/MIN/1.73SQ M
GLUCOSE SERPL-MCNC: 193 MG/DL (ref 65–140)
GLUCOSE SERPL-MCNC: 201 MG/DL (ref 65–99)
GLUCOSE SERPL-MCNC: 242 MG/DL (ref 65–140)
HCT VFR BLD AUTO: 34.5 % (ref 42–47)
HGB BLD-MCNC: 11.7 G/DL (ref 12–16)
MAGNESIUM SERPL-MCNC: 2.2 MG/DL (ref 1.9–2.7)
MCH RBC QN AUTO: 31 PG (ref 26–34)
MCHC RBC AUTO-ENTMCNC: 34 G/DL (ref 31–37)
MCV RBC AUTO: 91 FL (ref 81–99)
PLATELET # BLD AUTO: 283 THOUSANDS/UL (ref 149–390)
PMV BLD AUTO: 7.8 FL (ref 8.6–11.7)
POTASSIUM SERPL-SCNC: 4.1 MMOL/L (ref 3.5–5.5)
RBC # BLD AUTO: 3.78 MILLION/UL (ref 3.9–5.2)
SODIUM SERPL-SCNC: 138 MMOL/L (ref 134–143)
WBC # BLD AUTO: 11.7 THOUSAND/UL (ref 4.8–10.8)

## 2019-08-10 PROCEDURE — 82948 REAGENT STRIP/BLOOD GLUCOSE: CPT

## 2019-08-10 PROCEDURE — 85027 COMPLETE CBC AUTOMATED: CPT | Performed by: PHYSICIAN ASSISTANT

## 2019-08-10 PROCEDURE — 99239 HOSP IP/OBS DSCHRG MGMT >30: CPT | Performed by: INTERNAL MEDICINE

## 2019-08-10 PROCEDURE — 80048 BASIC METABOLIC PNL TOTAL CA: CPT | Performed by: PHYSICIAN ASSISTANT

## 2019-08-10 PROCEDURE — 83735 ASSAY OF MAGNESIUM: CPT | Performed by: PHYSICIAN ASSISTANT

## 2019-08-10 RX ADMIN — INSULIN LISPRO 2 UNITS: 100 INJECTION, SOLUTION INTRAVENOUS; SUBCUTANEOUS at 08:21

## 2019-08-10 RX ADMIN — INSULIN GLARGINE 15 UNITS: 100 INJECTION, SOLUTION SUBCUTANEOUS at 11:03

## 2019-08-10 RX ADMIN — ASPIRIN 81 MG: 81 TABLET, COATED ORAL at 08:20

## 2019-08-10 RX ADMIN — METOPROLOL TARTRATE 25 MG: 25 TABLET ORAL at 08:19

## 2019-08-10 RX ADMIN — ACETAMINOPHEN 650 MG: 325 TABLET ORAL at 08:20

## 2019-08-10 RX ADMIN — BUSPIRONE HYDROCHLORIDE 7.5 MG: 7.5 TABLET ORAL at 08:20

## 2019-08-10 RX ADMIN — ENOXAPARIN SODIUM 40 MG: 40 INJECTION SUBCUTANEOUS at 08:20

## 2019-08-10 NOTE — DISCHARGE INSTRUCTIONS
Metoprolol (By mouth)   Metoprolol (met-oh-PROE-lol)  Treats high blood pressure, angina (chest pain), and heart failure  May lower the risk of death after a heart attack  This medicine is a beta-blocker  Brand Name(s): Lopressor, Toprol XL   There may be other brand names for this medicine  When This Medicine Should Not Be Used: This medicine is not right for everyone  Do not use if you had an allergic reaction to metoprolol or similar medicines  Do not use this medicine if you have certain blood circulation or heart problems  Ask your doctor about these problems  How to Use This Medicine:   Tablet, Long Acting Tablet  · Take your medicine as directed  Your dose may need to be changed several times to find what works best for you  · Take this medicine with a meal or right after a meal  Take this medicine the same way every day, at the same time  · Swallow the tablet whole with a glass of water  You may break the extended-release tablet in half, but do not chew or crush it  · Missed dose: Take a dose as soon as you remember  If it is almost time for your next dose, wait until then and take a regular dose  Do not take extra medicine to make up for a missed dose  · Store the medicine in a closed container at room temperature, away from heat, moisture, and direct light  Drugs and Foods to Avoid:   Ask your doctor or pharmacist before using any other medicine, including over-the-counter medicines, vitamins, and herbal products  · Some medicines can affect how metoprolol works   Tell your doctor if you are taking any of the following:   ¨ Digoxin, dipyridamole, hydralazine, hydroxychloroquine, methyldopa, quinidine  ¨ Medicine to treat depression (such as bupropion, clomipramine, desipramine, fluoxetine, fluvoxamine, paroxetine, sertraline), medicine to treat mental illness (such as chlorpromazine, fluphenazine, haloperidol, thioridazine), medicine for heart rhythm problems (such as propafenone), HIV/AIDS medicine (such as ritonavir), medicine to treat a fungus infection (such as terbinafine), a monoamine oxidase inhibitor (MAOI), an ergot medicine for headaches, a calcium channel blocker (such as amlodipine, diltiazem, verapamil), or an alpha blocker (such as clonidine, prazosin, reserpine, guanethidine)  Warnings While Using This Medicine:   · Tell your doctor if you are pregnant or breastfeeding, or if you have blood vessel, heart, or circulation problems (such as heart failure, rhythm problems, or slow heartbeat)  Tell your doctor if you have kidney disease, liver disease, diabetes, lung disease (such as asthma), an overactive thyroid, or a history of allergies  · This medicine may cause worse symptoms of heart failure while the dose is being adjusted  · Do not stop using this medicine suddenly  Your doctor will need to slowly decrease your dose before you stop it completely  · Tell any doctor or dentist who treats you that you are using this medicine  You may need to stop using this medicine several days before you have surgery or medical tests  · This medicine could lower your blood pressure too much, especially when you first use it or if you are dehydrated  Stand or sit up slowly if you feel lightheaded or dizzy  · This medicine may make you dizzy or drowsy  Do not drive or do anything else that could be dangerous until you know how this medicine affects you  · Your doctor will check your progress and the effects of this medicine at regular visits  Keep all appointments  · Keep all medicine out of the reach of children  Never share your medicine with anyone    Possible Side Effects While Using This Medicine:   Call your doctor right away if you notice any of these side effects:  · Allergic reaction: Itching or hives, swelling in your face or hands, swelling or tingling in your mouth or throat, chest tightness, trouble breathing  · Lightheadedness, dizziness, or fainting  · Slow heartbeat  · Swelling in your hands, ankles, or feet, trouble breathing, tiredness  · Worsening chest pain  If you notice these less serious side effects, talk with your doctor:   · Diarrhea  · Mild dizziness or tiredness  If you notice other side effects that you think are caused by this medicine, tell your doctor  Call your doctor for medical advice about side effects  You may report side effects to FDA at 9-892-FDA-9884  © 2017 [employer] Information is for End User's use only and may not be sold, redistributed or otherwise used for commercial purposes  The above information is an  only  It is not intended as medical advice for individual conditions or treatments  Talk to your doctor, nurse or pharmacist before following any medical regimen to see if it is safe and effective for you

## 2019-08-10 NOTE — DISCHARGE SUMMARY
Discharge- Odean Sandifer 1965, 53 y o  female MRN: [id number]    Unit/Bed#: -02 Encounter: [id number]    Primary Care Provider: Edith Shaver DO   Date and time admitted to hospital: 8/8/2019 10:28 AM        * DKA (diabetic ketoacidoses) ([facility] )  Assessment & Plan  · Resolved  · Patient transitioned to Lantus subcu  · Continue metformin  · Monitor fingersticks at home  · Advised patient to document fingersticks for the next few days and follow up with Dr El Zimmer regarding further management    Rapid atrial fibrillation ([facility] )  Assessment & Plan  · No history of atrial fibrillation  · Cardiology input appreciated  · Patient refused full-dose anticoagulation  · Continue metoprolol per Cardiology  · Follow up as outpatient with Cardiology    Allergic reaction  Assessment & Plan  · Unclear cause of patient's allergic reaction  · Patient received steroids/Benadryl in the ER  · Continue Benadryl as needed  · Follow up with PCP as outpatient for further allergy workup    May need referral to allergist    Type 2 diabetes mellitus without complication, without long-term current use of insulin ([facility] )  Assessment & Plan  · Please see above    Hyperlipemia  Assessment & Plan  · Continue gemfibrozil    Benign essential hypertension  Assessment & Plan  · Blood pressure controlled with metoprolol  · Lisinopril discontinued  · Follow up with PCP as outpatient for further management      Discharging Physician / Practitioner: Randy Magana MD  PCP: Edith Shaver DO  Admission Date:   Admission Orders (From admission, onward)     Ordered        08/08/19 1209  Inpatient Admission (expected length of stay for this patient Order details is greater than two midnights)  Once                   Discharge Date: 08/10/19    Resolved Problems  Date Reviewed: 8/9/2019    None          Consultations During Hospital Stay:  · Cardiology    Procedures Performed:   · None    Significant Findings / Test Results:   · DKA    Incidental Findings:   · None     Test Results Pending at Discharge (will require follow up): · None     Outpatient Tests Requested:  · Follow up with Cardiology for possible Holter monitor    Complications:     None    Reason for Admission:  DKA    Hospital Course:     Marisabel Briseno is a 53 y o  female patient who originally presented to the hospital on 8/8/2019 due to allergic reaction  Patient was found to be in DKA on admission admitted to the ICU  Patient was started on insulin drip  Fingersticks gradually improved and DKA resolved  Patient was transitioned to Lantus and sliding scale  Patient was discharged on Lantus 15 units daily as well as metformin (which patient was taking prior to admission)  Patient advised to hold her Ozempic which she takes once weekly on Tuesdays  She should follow up with Dr Rohan Campa on Monday with documentation of her fingersticks over the next 48 hours  Based on fingersticks Dr Rohan Campa can make adjustments as needed to her regimen  On admission patient was also noted to be in rapid AFib and started on Cardizem drip  Cardizem drip was titrated and patient was transitioned to metoprolol 25 mg b i d   Patient was seen by Cardiology who recommended full-dose anticoagulation but patient refused  Patient was continued on aspirin  Patient advised to follow up with Cardiology as outpatient for further management  Patient did have a central line placed in her right IJ due to difficult blood draws  She was requiring frequent blood draws for DKA protocol and surgery was consulted to place right IJ  Central line will be removed prior to discharge  Patient initially presented to the ER with suspected allergic reaction  She was given Benadryl/steroids/Pepcid  Given patient's DKA episode steroids were not continued on admission  Patient's allergic reaction did resolve  Unclear what caused this reaction  Patient will follow up with PCP for further allergy testing    Advised to return to the ER if she had any allergy type symptoms  Please see above list of diagnoses and related plan for additional information  Condition at Discharge: stable     Discharge Day Visit / Exam:     Subjective:  No complaints at this time  Vitals: Blood Pressure: 140/82 (08/10/19 0717)  Pulse: 89 (08/10/19 0717)  Temperature: 97 9 °F (36 6 °C) (08/10/19 0717)  Temp Source: Temporal (08/10/19 0717)  Respirations: 18 (08/10/19 0717)  Height: 5' (152 4 cm) (08/09/19 1435)  Weight - Scale: 99 4 kg (219 lb 2 2 oz) (08/10/19 0600)  SpO2: 96 % (08/10/19 0717)     Exam:   Physical Exam   Constitutional: She appears well-developed  No distress  HENT:   Head: Normocephalic and atraumatic  Eyes: Conjunctivae and EOM are normal    Neck: Normal range of motion  Neck supple  Cardiovascular: Normal rate and regular rhythm  Pulmonary/Chest: Effort normal  No respiratory distress  Abdominal: Soft  She exhibits no distension  There is no tenderness  Musculoskeletal:   Congenital deformity of left lower extremity   Neurological: She is alert  No cranial nerve deficit  Skin: Skin is warm and dry  Psychiatric: She has a normal mood and affect  Her behavior is normal        Discussion with Family:  No family available at bedside during my evaluation    Discharge instructions/Information to patient and family:   See after visit summary for information provided to patient and family  Provisions for Follow-Up Care:  See after visit summary for information related to follow-up care and any pertinent home health orders  Disposition:     Home    For Discharges to [facility] SNF:   · Not Applicable to this Patient - Not Applicable to this Patient    Planned Readmission:    no     Discharge Statement:  I spent 35 minutes discharging the patient  This time was spent on the day of discharge  I had direct contact with the patient on the day of discharge   Greater than 50% of the total time was spent examining patient, answering all patient questions, arranging and discussing plan of care with patient as well as directly providing post-discharge instructions  Additional time then spent on discharge activities  Discharge Medications:  See after visit summary for reconciled discharge medications provided to patient and family        ** Please Note: This note has been constructed using a voice recognition system **

## 2019-08-10 NOTE — SOCIAL WORK
Pt  has  been evaluated by Dr Darrius Peterson and is stable to be discharged  Pt will be going home on Lantus Insulin  Spoke to [employer] at [facility]  Pt cost is $150 for 3 month supply  Pt aware of same and is agreeable  Pt is on Ozempic every 3 months  Will call Dr Con Hartley office on Monday for a follow up visit  Pt cell phone is [phone]  Friend here to transport home

## 2019-08-10 NOTE — NURSING NOTE
Patient to be DC home today, IV  And PICC removed as per MD order  DC instructions reviewed and given to patient, patient instructed to continue to take all med's as prescribed and to follow up with all MD appointments, pt verbalized understanding

## 2019-08-10 NOTE — ASSESSMENT & PLAN NOTE
· Resolved  · Patient transitioned to Lantus subcu  · Continue metformin  · Monitor fingersticks at home  · Advised patient to document fingersticks for the next few days and follow up with Dr Dalila Toribio regarding further management

## 2019-08-10 NOTE — ASSESSMENT & PLAN NOTE
· Unclear cause of patient's allergic reaction  · Patient received steroids/Benadryl in the ER  · Continue Benadryl as needed  · Follow up with PCP as outpatient for further allergy workup    May need referral to allergist

## 2019-08-10 NOTE — ASSESSMENT & PLAN NOTE
· No history of atrial fibrillation  · Cardiology input appreciated  · Patient refused full-dose anticoagulation  · Continue metoprolol per Cardiology  · Follow up as outpatient with Cardiology

## 2019-08-10 NOTE — PLAN OF CARE
Problem: Potential for Falls  Goal: Patient will remain free of falls  Description  INTERVENTIONS:  - Assess patient frequently for physical needs  -  Identify cognitive and physical deficits and behaviors that affect risk of falls    -  [place] fall precautions as indicated by assessment   - Educate patient/family on patient safety including physical limitations  - Instruct patient to call for assistance with activity based on assessment  - Modify environment to reduce risk of injury  - Consider OT/PT consult to assist with strengthening/mobility  Outcome: Adequate for Discharge     Problem: PAIN - ADULT  Goal: Verbalizes/displays adequate comfort level or baseline comfort level  Description  Interventions:  - Encourage patient to monitor pain and request assistance  - Assess pain using appropriate pain scale  - Administer analgesics based on type and severity of pain and evaluate response  - Implement non-pharmacological measures as appropriate and evaluate response  - Consider cultural and social influences on pain and pain management  - Notify physician/advanced practitioner if interventions unsuccessful or patient reports new pain  Outcome: Adequate for Discharge     Problem: INFECTION - ADULT  Goal: Absence or prevention of progression during hospitalization  Description  INTERVENTIONS:  - Assess and monitor for signs and symptoms of infection  - Monitor lab/diagnostic results  - Monitor all insertion sites, i e  indwelling lines, tubes, and drains  - Monitor endotracheal (as able) and nasal secretions for changes in amount and color  - [place] appropriate cooling/warming therapies per order  - Administer medications as ordered  - Instruct and encourage patient and family to use good hand hygiene technique  - Identify and instruct in appropriate isolation precautions for identified infection/condition  Outcome: Adequate for Discharge  Goal: Absence of fever/infection during neutropenic period  Description  INTERVENTIONS:  - Monitor WBC  - Implement neutropenic guidelines  Outcome: Adequate for Discharge     Problem: SAFETY ADULT  Goal: Maintain or return to baseline ADL function  Description  INTERVENTIONS:  -  Assess patient's ability to carry out ADLs; assess patient's baseline for ADL function and identify physical deficits which impact ability to perform ADLs (bathing, care of mouth/teeth, toileting, grooming, dressing, etc )  - Assess/evaluate cause of self-care deficits   - Assess range of motion  - Assess patient's mobility; develop plan if impaired  - Assess patient's need for assistive devices and provide as appropriate  - Encourage maximum independence but intervene and supervise when necessary  ¯ Involve family in performance of ADLs  ¯ Assess for home care needs following discharge   ¯ Request OT consult to assist with ADL evaluation and planning for discharge  ¯ Provide patient education as appropriate  Outcome: Adequate for Discharge  Goal: Maintain or return mobility status to optimal level  Description  INTERVENTIONS:  - Assess patient's baseline mobility status (ambulation, transfers, stairs, etc )    - Identify cognitive and physical deficits and behaviors that affect mobility  - Identify mobility aids required to assist with transfers and/or ambulation (gait belt, sit-to-stand, lift, walker, cane, etc )  - [place] fall precautions as indicated by assessment  - Record patient progress and toleration of activity level on Mobility SBAR; progress patient to next Phase/Stage  - Instruct patient to call for assistance with activity based on assessment  - Request Rehabilitation consult to assist with strengthening/weightbearing, etc   Outcome: Adequate for Discharge     Problem: DISCHARGE PLANNING  Goal: Discharge to home or other facility with appropriate resources  Description  INTERVENTIONS:  - Identify barriers to discharge w/patient and caregiver  - Arrange for needed discharge resources and transportation as appropriate  - Identify discharge learning needs (meds, wound care, etc )  - Arrange for interpretive services to assist at discharge as needed  - Refer to Case Management Department for coordinating discharge planning if the patient needs post-hospital services based on physician/advanced practitioner order or complex needs related to functional status, cognitive ability, or social support system  Outcome: Adequate for Discharge     Problem: Knowledge Deficit  Goal: Patient/family/caregiver demonstrates understanding of disease process, treatment plan, medications, and discharge instructions  Description  Complete learning assessment and assess knowledge base  Interventions:  - Provide teaching at level of understanding  - Provide teaching via preferred learning methods  Outcome: Adequate for Discharge     Problem: Nutrition/Hydration-ADULT  Goal: Nutrient/Hydration intake appropriate for improving, restoring or maintaining nutritional needs  Description  Monitor and assess patient's nutrition/hydration status for malnutrition (ex- brittle hair, bruises, dry skin, pale skin and conjunctiva, muscle wasting, smooth red tongue, and disorientation)  Collaborate with interdisciplinary team and initiate plan and interventions as ordered  Monitor patient's weight and dietary intake as ordered or per policy  Utilize nutrition screening tool and intervene per policy  Determine patient's food preferences and provide high-protein, high-caloric foods as appropriate       INTERVENTIONS:  - Monitor oral intake, urinary output, labs, and treatment plans  - Assess nutrition and hydration status and recommend course of action  - Evaluate amount of meals eaten  - Assist patient with eating if necessary   - Allow adequate time for meals  - Recommend/ encourage appropriate diets, oral nutritional supplements, and vitamin/mineral supplements  - Order, calculate, and assess calorie counts as needed  - Recommend, monitor, and adjust tube feedings and TPN/PPN based on assessed needs  - Assess need for intravenous fluids  - Provide specific nutrition/hydration education as appropriate  - Include patient/family/caregiver in decisions related to nutrition  Outcome: Adequate for Discharge     Problem: DISCHARGE PLANNING - CARE MANAGEMENT  Goal: Discharge to post-acute care or home with appropriate resources  Description  INTERVENTIONS:  - Conduct assessment to determine patient/family and health care team treatment goals, and need for post-acute services based on payer coverage, community resources, and patient preferences, and barriers to discharge  - Address psychosocial, clinical, and financial barriers to discharge as identified in assessment in conjunction with the patient/family and health care team  - Arrange appropriate level of post-acute services according to patient's   needs and preference and payer coverage in collaboration with the physician and health care team  - Communicate with and update the patient/family, physician, and health care team regarding progress on the discharge plan  - Arrange appropriate transportation to post-acute venues    Home, denies need for any services   Outcome: Adequate for Discharge

## 2019-08-10 NOTE — ASSESSMENT & PLAN NOTE
· Blood pressure controlled with metoprolol  · Lisinopril discontinued  · Follow up with PCP as outpatient for further management

## 2019-08-12 ENCOUNTER — TRANSITIONAL CARE MANAGEMENT (OUTPATIENT)
Dept: INTERNAL MEDICINE CLINIC | Facility: CLINIC | Age: 54
End: 2019-08-12

## 2019-08-12 ENCOUNTER — OFFICE VISIT (OUTPATIENT)
Dept: INTERNAL MEDICINE CLINIC | Facility: CLINIC | Age: 54
End: 2019-08-12
Payer: COMMERCIAL

## 2019-08-12 VITALS
HEIGHT: 60 IN | RESPIRATION RATE: 18 BRPM | DIASTOLIC BLOOD PRESSURE: 80 MMHG | HEART RATE: 68 BPM | BODY MASS INDEX: 41.43 KG/M2 | OXYGEN SATURATION: 97 % | TEMPERATURE: 98.4 F | WEIGHT: 211 LBS | SYSTOLIC BLOOD PRESSURE: 128 MMHG

## 2019-08-12 DIAGNOSIS — E78.2 MIXED HYPERLIPIDEMIA: ICD-10-CM

## 2019-08-12 DIAGNOSIS — E11.10 DIABETIC KETOACIDOSIS WITHOUT COMA ASSOCIATED WITH TYPE 2 DIABETES MELLITUS (HCC): Primary | ICD-10-CM

## 2019-08-12 DIAGNOSIS — I48.91 RAPID ATRIAL FIBRILLATION (HCC): ICD-10-CM

## 2019-08-12 DIAGNOSIS — E83.51 HYPOCALCEMIA: ICD-10-CM

## 2019-08-12 DIAGNOSIS — D64.9 ANEMIA, UNSPECIFIED TYPE: ICD-10-CM

## 2019-08-12 DIAGNOSIS — T78.40XA ALLERGIC REACTION, INITIAL ENCOUNTER: ICD-10-CM

## 2019-08-12 DIAGNOSIS — E11.9 TYPE 2 DIABETES MELLITUS WITHOUT COMPLICATION, WITHOUT LONG-TERM CURRENT USE OF INSULIN (HCC): ICD-10-CM

## 2019-08-12 DIAGNOSIS — R55 SYNCOPE, UNSPECIFIED SYNCOPE TYPE: ICD-10-CM

## 2019-08-12 DIAGNOSIS — E11.10 DKA (DIABETIC KETOACIDOSES): ICD-10-CM

## 2019-08-12 DIAGNOSIS — I48.91 ATRIAL FIBRILLATION, UNSPECIFIED TYPE (HCC): ICD-10-CM

## 2019-08-12 PROCEDURE — 1111F DSCHRG MED/CURRENT MED MERGE: CPT | Performed by: INTERNAL MEDICINE

## 2019-08-12 PROCEDURE — 99496 TRANSJ CARE MGMT HIGH F2F 7D: CPT | Performed by: INTERNAL MEDICINE

## 2019-08-12 NOTE — PROGRESS NOTES
Assessment/Plan:     No problem-specific Assessment & Plan notes found for this encounter  Diagnoses and all orders for this visit:    Diabetic ketoacidosis without coma associated with type 2 diabetes mellitus ([facility] [address] )  -     Comprehensive metabolic panel; Future    Atrial fibrillation, unspecified type ([facility] )  -     Comprehensive metabolic panel; Future  -     TSH, 3rd generation with Free T4 reflex; Future  -     Ambulatory referral to Cardiology; Future    Allergic reaction, initial encounter  -     Ambulatory referral to Allergy; Future    Hypocalcemia  -     Comprehensive metabolic panel; Future    Anemia, unspecified type  -     CBC and differential; Future    Syncope, unspecified syncope type  -     Ambulatory referral to Cardiology; Future     A/P: Doing better  Sugars up, but less than 200 w/o the lantus  Don't want her to go the other way and have low sugars  Continue to hold the lantus for now and continue with the SGLT2 and metformin  If additional control is needed, ? ?DPP4 or amaryl, but need to let her activity and diet stabilize  ?? ?cause of syncope most likely due vasovagal/orthostatic, but ??sz  May need an EEG  Has a cardioevent marker ordered  Suspect the calcium may be due to the insulin driving it into the cell  Will recheck  Will recheck her H/H and may be due to hemodilution  BP is better despite the metoprolol and appears to be in sinus  Pt reports h/o TIA/CVA and ?? If she had PAF in the past  Don't see a TSH on the admission and will order  Refer to cards  ??cause of the allergic reaction and ??reaction cause the afib and HHNK or vice versa  Refer to allergist  Continue the ASA at least for now, since she doesn't want anticoagulation  Obtain further sugars and keep f/u with PCP later this week  Subjective:     Patient ID: Tamika Rangel is a 53 y o  female  Type 2 diabetic WF presents with a brief admission for dizziness, hypotension, and vomiting with a rash   Reports LOC w/o injury  Denies sz, but some incontinence of stool  W/u revealed DKA(?HHNS), allergic reaction, and new onset afib  Coarse was complicated by low calcium and slight anemia  Admitted to ICU and started on an insulin and cardizem gtt  Was given steroids, benadryl, etc for the allergic reactions  Cause of the reaction was unknown  Pt improved and started on SQ basal insulin and metoprolol  Pt refused anticoagulation except for ASA  Pt also reports not starting the lantus since d/c and is only taking her metformin and Ozempic on Tuesdays  R/o for cardiac ischemia with echo 50%EF, ASHWIN, and MR/TR  D/c to home and has been doing ok  No CP, SOB, lightheadedness, or palpitations  No fever or chills  No n/v  AM sugars 150-180 and -160  Tolerating the meds and activity improving  Reports no known new exposures  Review of Systems   Constitutional: Negative for activity change, chills, diaphoresis, fatigue and fever  HENT: Negative  Eyes: Negative for visual disturbance  Respiratory: Negative for cough, chest tightness, shortness of breath and wheezing  Cardiovascular: Negative for chest pain, palpitations and leg swelling  Gastrointestinal: Negative for abdominal pain, constipation, diarrhea, nausea and vomiting  Endocrine: Negative for cold intolerance and heat intolerance  Genitourinary: Negative for difficulty urinating, dysuria and frequency  Musculoskeletal: Negative for arthralgias, gait problem and myalgias  Allergic/Immunologic: Negative for environmental allergies and food allergies  Neurological: Negative for dizziness, seizures, syncope, weakness, light-headedness and headaches  Psychiatric/Behavioral: Negative for confusion and dysphoric mood  The patient is not nervous/anxious  Objective:     Physical Exam   Constitutional: She is oriented to person, place, and time  She appears well-developed and well-nourished  No distress     HENT:   Head: Normocephalic and atraumatic  Mouth/Throat: Oropharynx is clear and moist    Eyes: Pupils are equal, round, and reactive to light  Conjunctivae and EOM are normal    Neck: Normal range of motion  Neck supple  No JVD present  Cardiovascular: Normal rate, regular rhythm and normal heart sounds  Pulmonary/Chest: Effort normal and breath sounds normal  No respiratory distress  She has no wheezes  She has no rales  Abdominal: Soft  Bowel sounds are normal  She exhibits no distension  There is no tenderness  Musculoskeletal: She exhibits no edema  Neurological: She is alert and oriented to person, place, and time  Psychiatric: She has a normal mood and affect  Her behavior is normal  Judgment and thought content normal    Nursing note and vitals reviewed  Vitals:    08/12/19 0814   BP: 128/80   BP Location: Right arm   Patient Position: Sitting   Cuff Size: Large   Pulse: 68   Resp: 18   Temp: 98 4 °F (36 9 °C)   TempSrc: Tympanic   SpO2: 97%   Weight: 95 7 kg (211 lb)   Height: 5' (1 524 m)       Transitional Care Management Review:  Ashley Singh is a 53 y o  female here for TCM follow up  During the TCM phone call patient stated:    TCM Call (since 7/12/2019)     Date and time call was made  8/12/2019  8:08 AM    Hospital care reviewed  Records reviewed    Patient was hospitialized at  [address] [place] [address]    Date of Admission  08/08/19    Date of discharge  08/10/19    Diagnosis  hypotension and elevated blood sugar    Disposition  Home    Were the patients medications reviewed and updated  Yes    Current Symptoms  None      TCM Call (since 7/12/2019)     Post hospital issues  None    Should patient be enrolled in anticoag monitoring? No    Scheduled for follow up?   Yes    Did you obtain your prescribed medications  Yes    Do you need help managing your prescriptions or medications  No    Is transportation to your appointment needed  No    I have advised the patient to call PCP with any new or worsening symptoms  Rubina Jc    Are you recieving any outpatient services  No    Are you recieving home care services  No    Are you using any community resources  No    Current waiver services  No    Have you fallen in the last 12 months  No    Interperter language line needed  No    Counseling  Patient          Mundo Vigil, DO

## 2019-08-12 NOTE — UTILIZATION REVIEW
Notification of Discharge  This is a Notification of Discharge from our facility [address]  Please be advised that this patient has been discharge from our facility  Below you will find the admission and discharge date and time including the patients disposition  PRESENTATION DATE: 8/8/2019 10:28 AM  OBS ADMISSION DATE:   IP ADMISSION DATE: 8/8/19 1209   DISCHARGE DATE: 8/10/2019  1:02 PM  DISPOSITION: Home/Self Care Home/Self Care   Admission Orders listed below:  Admission Orders (From admission, onward)     Ordered        08/08/19 1209  Inpatient Admission (expected length of stay for this patient Order details is greater than two midnights)  Once                   Please contact the UR Department if additional information is required to close this patient's authorization/case  [address]  Utilization Review Department  Phone: [phone]; Fax [phone]  [email or web address]  ATTENTION: Please call with any questions or concerns to [phone]  and carefully listen to the prompts so that you are directed to the right person  Send all requests for admission clinical reviews, approved or denied determinations and any other requests to fax [phone]   All voicemails are confidential

## 2019-08-12 NOTE — SOCIAL WORK
TC to Dr Carlo Rene office to set up appt  D/t pt going home on Saturday new to insulin  Santino Argueta at Dr Carlo Rene office states the pt was seen by MD  Today

## 2019-08-14 ENCOUNTER — APPOINTMENT (OUTPATIENT)
Dept: LAB | Facility: HOSPITAL | Age: 54
End: 2019-08-14
Payer: COMMERCIAL

## 2019-08-14 DIAGNOSIS — D64.9 ANEMIA, UNSPECIFIED TYPE: ICD-10-CM

## 2019-08-14 DIAGNOSIS — I48.91 ATRIAL FIBRILLATION, UNSPECIFIED TYPE (HCC): ICD-10-CM

## 2019-08-14 DIAGNOSIS — E11.10 DIABETIC KETOACIDOSIS WITHOUT COMA ASSOCIATED WITH TYPE 2 DIABETES MELLITUS (HCC): ICD-10-CM

## 2019-08-14 DIAGNOSIS — E83.51 HYPOCALCEMIA: ICD-10-CM

## 2019-08-14 LAB
ALBUMIN SERPL BCP-MCNC: 4 G/DL (ref 3.5–5.7)
ALP SERPL-CCNC: 105 U/L (ref 40–150)
ALT SERPL W P-5'-P-CCNC: 29 U/L (ref 7–52)
ANION GAP SERPL CALCULATED.3IONS-SCNC: 10 MMOL/L (ref 4–13)
AST SERPL W P-5'-P-CCNC: 18 U/L (ref 13–39)
BASOPHILS # BLD AUTO: 0.08 THOUSAND/UL (ref 0–0.1)
BASOPHILS NFR MAR MANUAL: 1 % (ref 0–1)
BILIRUB SERPL-MCNC: 0.6 MG/DL (ref 0.2–1)
BUN SERPL-MCNC: 14 MG/DL (ref 7–25)
CALCIUM SERPL-MCNC: 10 MG/DL (ref 8.6–10.5)
CHLORIDE SERPL-SCNC: 103 MMOL/L (ref 98–107)
CO2 SERPL-SCNC: 24 MMOL/L (ref 21–31)
CREAT SERPL-MCNC: 0.76 MG/DL (ref 0.6–1.2)
EOSINOPHIL # BLD AUTO: 0.16 THOUSAND/UL (ref 0–0.61)
EOSINOPHIL NFR BLD MANUAL: 2 % (ref 0–6)
ERYTHROCYTE [DISTWIDTH] IN BLOOD BY AUTOMATED COUNT: 13.5 % (ref 11.5–14.5)
GFR SERPL CREATININE-BSD FRML MDRD: 89 ML/MIN/1.73SQ M
GLUCOSE P FAST SERPL-MCNC: 190 MG/DL (ref 65–99)
HCT VFR BLD AUTO: 39.5 % (ref 42–47)
HGB BLD-MCNC: 13.3 G/DL (ref 12–16)
LYMPHOCYTES # BLD AUTO: 1.72 THOUSAND/UL (ref 0.6–4.47)
LYMPHOCYTES # BLD AUTO: 21 % (ref 20–51)
MCH RBC QN AUTO: 30.7 PG (ref 26–34)
MCHC RBC AUTO-ENTMCNC: 33.7 G/DL (ref 31–37)
MCV RBC AUTO: 91 FL (ref 81–99)
MONOCYTES # BLD AUTO: 1.07 THOUSAND/UL (ref 0–1.22)
MONOCYTES NFR BLD AUTO: 13 % (ref 4–12)
NEUTS SEG # BLD: 5.17 THOUSAND/UL (ref 1.81–6.82)
NEUTS SEG NFR BLD AUTO: 63 % (ref 42–75)
PLATELET # BLD AUTO: 362 THOUSANDS/UL (ref 149–390)
PLATELET BLD QL SMEAR: ADEQUATE
PMV BLD AUTO: 7.8 FL (ref 8.6–11.7)
POTASSIUM SERPL-SCNC: 4.2 MMOL/L (ref 3.5–5.5)
PROT SERPL-MCNC: 6.9 G/DL (ref 6.4–8.9)
RBC # BLD AUTO: 4.34 MILLION/UL (ref 3.9–5.2)
RBC MORPH BLD: NORMAL
SODIUM SERPL-SCNC: 137 MMOL/L (ref 134–143)
TOTAL CELLS COUNTED SPEC: 100
TSH SERPL DL<=0.05 MIU/L-ACNC: 1.2 UIU/ML (ref 0.45–5.33)
WBC # BLD AUTO: 8.2 THOUSAND/UL (ref 4.8–10.8)

## 2019-08-14 PROCEDURE — 36415 COLL VENOUS BLD VENIPUNCTURE: CPT

## 2019-08-14 PROCEDURE — 80053 COMPREHEN METABOLIC PANEL: CPT

## 2019-08-14 PROCEDURE — 84443 ASSAY THYROID STIM HORMONE: CPT

## 2019-08-14 PROCEDURE — 85007 BL SMEAR W/DIFF WBC COUNT: CPT

## 2019-08-14 PROCEDURE — 85027 COMPLETE CBC AUTOMATED: CPT

## 2019-08-16 ENCOUNTER — OFFICE VISIT (OUTPATIENT)
Dept: INTERNAL MEDICINE CLINIC | Facility: CLINIC | Age: 54
End: 2019-08-16
Payer: COMMERCIAL

## 2019-08-16 ENCOUNTER — TELEPHONE (OUTPATIENT)
Dept: INTERNAL MEDICINE CLINIC | Facility: CLINIC | Age: 54
End: 2019-08-16

## 2019-08-16 VITALS
WEIGHT: 230 LBS | TEMPERATURE: 98.1 F | HEART RATE: 76 BPM | HEIGHT: 60 IN | DIASTOLIC BLOOD PRESSURE: 60 MMHG | BODY MASS INDEX: 45.16 KG/M2 | OXYGEN SATURATION: 96 % | SYSTOLIC BLOOD PRESSURE: 120 MMHG

## 2019-08-16 DIAGNOSIS — D64.9 ANEMIA, UNSPECIFIED TYPE: ICD-10-CM

## 2019-08-16 DIAGNOSIS — E66.01 MORBID OBESITY WITH BMI OF 40.0-44.9, ADULT (HCC): ICD-10-CM

## 2019-08-16 DIAGNOSIS — E11.9 DIABETIC EYE EXAM (HCC): ICD-10-CM

## 2019-08-16 DIAGNOSIS — E11.9 TYPE 2 DIABETES MELLITUS WITHOUT COMPLICATION, WITHOUT LONG-TERM CURRENT USE OF INSULIN (HCC): ICD-10-CM

## 2019-08-16 DIAGNOSIS — E11.9 TYPE 2 DIABETES MELLITUS WITHOUT COMPLICATION, WITHOUT LONG-TERM CURRENT USE OF INSULIN (HCC): Primary | ICD-10-CM

## 2019-08-16 DIAGNOSIS — Z01.00 DIABETIC EYE EXAM (HCC): ICD-10-CM

## 2019-08-16 LAB — SL AMB POCT HEMOGLOBIN AIC: 12.6 (ref ?–6.5)

## 2019-08-16 PROCEDURE — 3046F HEMOGLOBIN A1C LEVEL >9.0%: CPT | Performed by: INTERNAL MEDICINE

## 2019-08-16 PROCEDURE — 99214 OFFICE O/P EST MOD 30 MIN: CPT | Performed by: INTERNAL MEDICINE

## 2019-08-16 PROCEDURE — 1036F TOBACCO NON-USER: CPT | Performed by: INTERNAL MEDICINE

## 2019-08-16 PROCEDURE — 83036 HEMOGLOBIN GLYCOSYLATED A1C: CPT | Performed by: INTERNAL MEDICINE

## 2019-08-16 RX ORDER — BLOOD SUGAR DIAGNOSTIC
STRIP MISCELLANEOUS
Qty: 200 EACH | Refills: 3 | Status: SHIPPED | OUTPATIENT
Start: 2019-08-16 | End: 2019-08-16 | Stop reason: SDUPTHER

## 2019-08-16 RX ORDER — LANCETS 28 GAUGE
EACH MISCELLANEOUS
Qty: 200 EACH | Refills: 2 | Status: SHIPPED | OUTPATIENT
Start: 2019-08-16 | End: 2019-08-16 | Stop reason: SDUPTHER

## 2019-08-16 NOTE — PATIENT INSTRUCTIONS
Obesity   AMBULATORY CARE:   Obesity  is when your body mass index (BMI) is greater than 30  Your healthcare provider will use your height and weight to measure your BMI  The risks of obesity include  many health problems, such as injuries or physical disability  You may need tests to check for the following:  · Diabetes     · High blood pressure or high cholesterol     · Heart disease     · Gallbladder or liver disease     · Cancer of the colon, breast, prostate, liver, or kidney     · Sleep apnea     · Arthritis or gout  Seek care immediately if:   · You have a severe headache, confusion, or difficulty speaking  · You have weakness on one side of your body  · You have chest pain, sweating, or shortness of breath  Contact your healthcare provider if:   · You have symptoms of gallbladder or liver disease, such as pain in your upper abdomen  · You have knee or hip pain and discomfort while walking  · You have symptoms of diabetes, such as intense hunger and thirst, and frequent urination  · You have symptoms of sleep apnea, such as snoring or daytime sleepiness  · You have questions or concerns about your condition or care  Treatment for obesity  focuses on helping you lose weight to improve your health  Even a small decrease in BMI can reduce the risk for many health problems  Your healthcare provider will help you set a weight-loss goal   · Lifestyle changes  are the first step in treating obesity  These include making healthy food choices and getting regular physical activity  Your healthcare provider may suggest a weight-loss program that involves coaching, education, and therapy  · Medicine  may help you lose weight when it is used with a healthy diet and physical activity  · Surgery  can help you lose weight if you are very obese and have other health problems  There are several types of weight-loss surgery  Ask your healthcare provider for more information    Be successful losing weight:   · Set small, realistic goals  An example of a small goal is to walk for 20 minutes 5 days a week  Anther goal is to lose 5% of your body weight  · Tell friends, family members, and coworkers about your goals  and ask for their support  Ask a friend to lose weight with you, or join a weight-loss support group  · Identify foods or triggers that may cause you to overeat , and find ways to avoid them  Remove tempting high-calorie foods from your home and workplace  Place a bowl of fresh fruit on your kitchen counter  If stress causes you to eat, then find other ways to cope with stress  · Keep a diary to track what you eat and drink  Also write down how many minutes of physical activity you do each day  Weigh yourself once a week and record it in your diary  Eating changes: You will need to eat 500 to 1,000 fewer calories each day than you currently eat to lose 1 to 2 pounds a week  The following changes will help you cut calories:  · Eat smaller portions  Use small plates, no larger than 9 inches in diameter  Fill your plate half full of fruits and vegetables  Measure your food using measuring cups until you know what a serving size looks like  · Eat 3 meals and 1 or 2 snacks each day  Plan your meals in advance  Jannette Boyd and eat at home most of the time  Eat slowly  · Eat fruits and vegetables at every meal   They are low in calories and high in fiber, which makes you feel full  Do not add butter, margarine, or cream sauce to vegetables  Use herbs to season steamed vegetables  · Eat less fat and fewer fried foods  Eat more baked or grilled chicken and fish  These protein sources are lower in calories and fat than red meat  Limit fast food  Dress your salads with olive oil and vinegar instead of bottled dressing  · Limit the amount of sugar you eat  Do not drink sugary beverages  Limit alcohol  Activity changes:  Physical activity is good for your body in many ways   It helps you burn calories and build strong muscles  It decreases stress and depression, and improves your mood  It can also help you sleep better  Talk to your healthcare provider before you begin an exercise program   · Exercise for at least 30 minutes 5 days a week  Start slowly  Set aside time each day for physical activity that you enjoy and that is convenient for you  It is best to do both weight training and an activity that increases your heart rate, such as walking, bicycling, or swimming  · Find ways to be more active  Do yard work and housecleaning  Walk up the stairs instead of using elevators  Spend your leisure time going to events that require walking, such as outdoor festivals or fairs  This extra physical activity can help you lose weight and keep it off  Follow up with your healthcare provider as directed: You may need to meet with a dietitian  Write down your questions so you remember to ask them during your visits  © 2017 [employer] Information is for End User's use only and may not be sold, redistributed or otherwise used for commercial purposes  All illustrations and images included in CareNotes® are the copyrighted property of A D [employer] M [employer] Inc  or David Morton  The above information is an  only  It is not intended as medical advice for individual conditions or treatments  Talk to your doctor, nurse or pharmacist before following any medical regimen to see if it is safe and effective for you

## 2019-08-16 NOTE — PROGRESS NOTES
Assessment/Plan:       Diagnoses and all orders for this visit:    Type 2 diabetes mellitus without complication, without long-term current use of insulin ([facility])  -     POCT hemoglobin A1c  -     Microalbumin / creatinine urine ratio  -     Ambulatory Referral to Ophthalmology; Future  -     FREESTYLE TEST STRIPS test strip; Twice a day  -     Lancets (FREESTYLE) lancets; Twice a day    Diabetic eye exam [facility])  -     Ambulatory Referral to Ophthalmology; Future    Anemia, unspecified type  -     CBC and differential; Future        No problem-specific Assessment & Plan notes found for this encounter  The patient diagnosis of antiphospholipid antibody and lupus were discontinued because the tests were negative  Subjective:      Patient ID: Pilar Gonzalez is a 53 y o  female  The patient HgbA1c was noted to be elevated and the patient states that she is doing well with the Ozempic and Metformin  She refuses insulin regardless of the fact her HgbA1c is elevated  She would like to stop the Metoprolol because she feels that her BP was low in the hospital   However, it is good here  The following portions of the patient's history were reviewed and updated as appropriate:   She has a past medical history of Bilateral hydronephrosis (1/24/2018), Hypertension, Left thyroid nodule (1/24/2018), Non Hodgkin's lymphoma ([facility] [address] ) (11/2/2016), and Panic attack  ,  does not have any pertinent problems on file  ,   has no past surgical history on file  ,  family history includes Dementia in her mother; Parkinsonism in her mother  ,   reports that she has never smoked  She has never used smokeless tobacco  She reports that she does not drink alcohol or use drugs  ,  is allergic to clam shell and penicillins     Current Outpatient Medications   Medication Sig Dispense Refill    Blood Glucose Monitoring Suppl (FREESTYLE LITE) ASHLEY by Does not apply route 2 (two) times a day FreeStyle Lite Device (Glucometer) 1 each 0    colesevelam [facility]) 625 mg tablet TAKE 3 TABLETS TWICE A DAY  180 tablet 5    gemfibrozil (LOPID) 600 mg tablet Take 1 tablet (600 mg total) by mouth 2 (two) times a day 60 tablet 5    glucose monitoring kit (FREESTYLE) monitoring kit 1 each by Does not apply route 2 (two) times a day 1 each 0    metFORMIN (GLUCOPHAGE) 1000 MG tablet TAKE 1 TABLET (1,000 MG TOTAL) BY MOUTH 2 (TWO) TIMES A DAY WITH MEALS TAKE 1/2 IN MORNING AND 1 TABLET IN THE EVENING (Patient taking differently: Take 1,000 mg by mouth 2 (two) times a day with meals ) 60 tablet 5    metoprolol tartrate (LOPRESSOR) 25 mg tablet Take 1 tablet (25 mg total) by mouth every 12 (twelve) hours 60 tablet 0    Semaglutide (OZEMPIC) 0 25 or 0 5 MG/DOSE SOPN Inject 0 25 mg under the skin once a week for 180 days 1 pen 0    FREESTYLE TEST STRIPS test strip Twice a day 200 each 3    insulin glargine (LANTUS SOLOSTAR) 100 units/mL injection pen Inject 15 Units under the skin daily (Patient not taking: Reported on 8/16/2019) 5 pen 0    Lancets (FREESTYLE) lancets Twice a day 200 each 2     No current facility-administered medications for this visit  Review of Systems   Constitutional: Negative for chills, fatigue and fever  HENT: Negative  Respiratory: Negative  Negative for cough, chest tightness and shortness of breath  Cardiovascular: Negative for chest pain, palpitations and leg swelling  Gastrointestinal: Negative for abdominal pain, constipation, diarrhea, nausea and vomiting  Genitourinary: Negative  Musculoskeletal: Negative for back pain and myalgias  Skin: Negative  Neurological: Negative  Psychiatric/Behavioral: Negative  Objective:  Vitals:    08/16/19 0913   BP: 120/60   Pulse: 76   Temp: 98 1 °F (36 7 °C)   SpO2: 96%   Weight: 104 kg (230 lb)   Height: 5' (1 524 m)     Body mass index is 44 92 kg/m²  Physical Exam   Constitutional: She is oriented to person, place, and time   She appears well-developed and well-nourished  HENT:   Head: Normocephalic and atraumatic  Eyes: Pupils are equal, round, and reactive to light  Neck: Normal range of motion  Neck supple  Cardiovascular: Normal rate  Pulmonary/Chest: Effort normal and breath sounds normal    Abdominal: Soft  Bowel sounds are normal    Musculoskeletal: Normal range of motion  She exhibits edema  She exhibits no tenderness  Neurological: She is alert and oriented to person, place, and time  No cranial nerve deficit  Skin: Skin is warm and dry  Psychiatric: She has a normal mood and affect  Nursing note and vitals reviewed  BMI Counseling: Body mass index is 44 92 kg/m²  Discussed the patient's BMI with her  The BMI is above average  BMI counseling and education was provided to the patient  Nutrition recommendations include reducing portion sizes

## 2019-08-16 NOTE — TELEPHONE ENCOUNTER
Pt was in office today and scripts went to [facility] which she does not use any more  Could we change to [employer] and cancel the ones which went to [facility]?  Thanks

## 2019-08-19 RX ORDER — BLOOD SUGAR DIAGNOSTIC
STRIP MISCELLANEOUS
Qty: 200 EACH | Refills: 3 | Status: SHIPPED | OUTPATIENT
Start: 2019-08-19 | End: 2019-10-17 | Stop reason: SDUPTHER

## 2019-08-19 RX ORDER — LANCETS 28 GAUGE
EACH MISCELLANEOUS
Qty: 200 EACH | Refills: 2 | Status: SHIPPED | OUTPATIENT
Start: 2019-08-19 | End: 2019-10-17 | Stop reason: SDUPTHER

## 2019-09-10 ENCOUNTER — APPOINTMENT (OUTPATIENT)
Dept: LAB | Facility: HOSPITAL | Age: 54
End: 2019-09-10
Payer: COMMERCIAL

## 2019-09-10 DIAGNOSIS — D64.9 ANEMIA, UNSPECIFIED TYPE: ICD-10-CM

## 2019-09-10 DIAGNOSIS — D64.9 ANEMIA, UNSPECIFIED TYPE: Primary | ICD-10-CM

## 2019-09-10 LAB
BASOPHILS # BLD AUTO: 0.1 THOUSANDS/ΜL (ref 0–0.1)
BASOPHILS NFR BLD AUTO: 1 % (ref 0–2)
CREAT UR-MCNC: 82.7 MG/DL
EOSINOPHIL # BLD AUTO: 0.5 THOUSAND/ΜL (ref 0–0.61)
EOSINOPHIL NFR BLD AUTO: 7 % (ref 0–5)
ERYTHROCYTE [DISTWIDTH] IN BLOOD BY AUTOMATED COUNT: 13.6 % (ref 11.5–14.5)
HCT VFR BLD AUTO: 37.6 % (ref 42–47)
HGB BLD-MCNC: 12.8 G/DL (ref 12–16)
LYMPHOCYTES # BLD AUTO: 2.7 THOUSANDS/ΜL (ref 0.6–4.47)
LYMPHOCYTES NFR BLD AUTO: 36 % (ref 21–51)
MCH RBC QN AUTO: 31.3 PG (ref 26–34)
MCHC RBC AUTO-ENTMCNC: 34.1 G/DL (ref 31–37)
MCV RBC AUTO: 92 FL (ref 81–99)
MICROALBUMIN UR-MCNC: 89.4 MG/L (ref 0–20)
MICROALBUMIN/CREAT 24H UR: 108 MG/G CREATININE (ref 0–30)
MONOCYTES # BLD AUTO: 0.4 THOUSAND/ΜL (ref 0.17–1.22)
MONOCYTES NFR BLD AUTO: 6 % (ref 2–12)
NEUTROPHILS # BLD AUTO: 3.8 THOUSANDS/ΜL (ref 1.4–6.5)
NEUTS SEG NFR BLD AUTO: 51 % (ref 42–75)
PLATELET # BLD AUTO: 384 THOUSANDS/UL (ref 149–390)
PMV BLD AUTO: 8.6 FL (ref 8.6–11.7)
RBC # BLD AUTO: 4.1 MILLION/UL (ref 3.9–5.2)
WBC # BLD AUTO: 7.5 THOUSAND/UL (ref 4.8–10.8)

## 2019-09-10 PROCEDURE — 82043 UR ALBUMIN QUANTITATIVE: CPT | Performed by: INTERNAL MEDICINE

## 2019-09-10 PROCEDURE — 85025 COMPLETE CBC W/AUTO DIFF WBC: CPT

## 2019-09-10 PROCEDURE — 82570 ASSAY OF URINE CREATININE: CPT | Performed by: INTERNAL MEDICINE

## 2019-09-10 PROCEDURE — 36415 COLL VENOUS BLD VENIPUNCTURE: CPT

## 2019-09-13 ENCOUNTER — DOCUMENTATION (OUTPATIENT)
Dept: INTERNAL MEDICINE CLINIC | Facility: CLINIC | Age: 54
End: 2019-09-13

## 2019-09-13 DIAGNOSIS — I10 BENIGN ESSENTIAL HYPERTENSION: Primary | ICD-10-CM

## 2019-09-13 RX ORDER — LISINOPRIL 20 MG/1
20 TABLET ORAL DAILY
Qty: 30 TABLET | Refills: 2 | Status: SHIPPED | OUTPATIENT
Start: 2019-09-13 | End: 2019-12-18 | Stop reason: SDUPTHER

## 2019-09-13 RX ORDER — LISINOPRIL 20 MG/1
20 TABLET ORAL DAILY
COMMUNITY
End: 2019-09-13 | Stop reason: SDUPTHER

## 2019-09-17 ENCOUNTER — APPOINTMENT (OUTPATIENT)
Dept: LAB | Facility: HOSPITAL | Age: 54
End: 2019-09-17
Payer: COMMERCIAL

## 2019-09-17 DIAGNOSIS — D64.9 ANEMIA, UNSPECIFIED TYPE: ICD-10-CM

## 2019-09-17 LAB
BASOPHILS # BLD AUTO: 0 THOUSANDS/ΜL (ref 0–0.1)
BASOPHILS NFR BLD AUTO: 0 % (ref 0–2)
EOSINOPHIL # BLD AUTO: 0.2 THOUSAND/ΜL (ref 0–0.61)
EOSINOPHIL NFR BLD AUTO: 3 % (ref 0–5)
ERYTHROCYTE [DISTWIDTH] IN BLOOD BY AUTOMATED COUNT: 14 % (ref 11.5–14.5)
HCT VFR BLD AUTO: 39.3 % (ref 42–47)
HGB BLD-MCNC: 13.4 G/DL (ref 12–16)
LYMPHOCYTES # BLD AUTO: 2 THOUSANDS/ΜL (ref 0.6–4.47)
LYMPHOCYTES NFR BLD AUTO: 27 % (ref 21–51)
MCH RBC QN AUTO: 31.2 PG (ref 26–34)
MCHC RBC AUTO-ENTMCNC: 34.1 G/DL (ref 31–37)
MCV RBC AUTO: 92 FL (ref 81–99)
MONOCYTES # BLD AUTO: 0.4 THOUSAND/ΜL (ref 0.17–1.22)
MONOCYTES NFR BLD AUTO: 5 % (ref 2–12)
NEUTROPHILS # BLD AUTO: 4.8 THOUSANDS/ΜL (ref 1.4–6.5)
NEUTS SEG NFR BLD AUTO: 65 % (ref 42–75)
PLATELET # BLD AUTO: 405 THOUSANDS/UL (ref 149–390)
PMV BLD AUTO: 8.9 FL (ref 8.6–11.7)
RBC # BLD AUTO: 4.29 MILLION/UL (ref 3.9–5.2)
WBC # BLD AUTO: 7.4 THOUSAND/UL (ref 4.8–10.8)

## 2019-09-17 PROCEDURE — 85025 COMPLETE CBC W/AUTO DIFF WBC: CPT

## 2019-09-17 PROCEDURE — 36415 COLL VENOUS BLD VENIPUNCTURE: CPT

## 2019-09-23 ENCOUNTER — OFFICE VISIT (OUTPATIENT)
Dept: INTERNAL MEDICINE CLINIC | Facility: CLINIC | Age: 54
End: 2019-09-23
Payer: COMMERCIAL

## 2019-09-23 VITALS
OXYGEN SATURATION: 98 % | BODY MASS INDEX: 39.66 KG/M2 | TEMPERATURE: 98.7 F | SYSTOLIC BLOOD PRESSURE: 124 MMHG | HEART RATE: 90 BPM | WEIGHT: 202 LBS | RESPIRATION RATE: 18 BRPM | HEIGHT: 60 IN | DIASTOLIC BLOOD PRESSURE: 78 MMHG

## 2019-09-23 DIAGNOSIS — I48.91 RAPID ATRIAL FIBRILLATION (HCC): ICD-10-CM

## 2019-09-23 DIAGNOSIS — E11.10 DIABETIC KETOACIDOSIS WITHOUT COMA ASSOCIATED WITH TYPE 2 DIABETES MELLITUS (HCC): Primary | ICD-10-CM

## 2019-09-23 DIAGNOSIS — C85.90 NON-HODGKIN'S LYMPHOMA, UNSPECIFIED BODY REGION, UNSPECIFIED NON-HODGKIN LYMPHOMA TYPE (HCC): ICD-10-CM

## 2019-09-23 DIAGNOSIS — E11.9 TYPE 2 DIABETES MELLITUS WITHOUT COMPLICATION, WITHOUT LONG-TERM CURRENT USE OF INSULIN (HCC): ICD-10-CM

## 2019-09-23 LAB — SL AMB POCT HEMOGLOBIN AIC: 9 (ref ?–6.5)

## 2019-09-23 PROCEDURE — 99214 OFFICE O/P EST MOD 30 MIN: CPT | Performed by: INTERNAL MEDICINE

## 2019-09-23 PROCEDURE — 83036 HEMOGLOBIN GLYCOSYLATED A1C: CPT | Performed by: INTERNAL MEDICINE

## 2019-09-23 NOTE — PROGRESS NOTES
Assessment/Plan:    Problem List Items Addressed This Visit        Endocrine    Type 2 diabetes mellitus without complication, without long-term current use of insulin (HCC)    RESOLVED: DKA (diabetic ketoacidoses) ([place] [address] ) - Primary       Cardiovascular and Mediastinum    Rapid atrial fibrillation (HCC)       Other    Non Hodgkin's lymphoma ([place] [address] )           Diagnoses and all orders for this visit:    Diabetic ketoacidosis without coma associated with type 2 diabetes mellitus ([facility] )    Type 2 diabetes mellitus without complication, without long-term current use of insulin (HCC)    Rapid atrial fibrillation (HCC)    Non-Hodgkin's lymphoma, unspecified body region, unspecified non-Hodgkin lymphoma type ([place] [address] )    Other orders  -     Insulin Pen Needle (COMFORT EZ PEN NEEDLES) 31G X 6 MM MISC; by Does not apply route        No problem-specific Assessment & Plan notes found for this encounter  Subjective:      Patient ID: Deandre Mao is a 53 y o  female  The patient is following up  She has stopped the Ozempic  However, she is accepting of the Lantus and metformin  The patient states that she is not interested in [employer] either  The patient was noted to have a hgbA1c of 12 6 in August   The patient states that her fasting glucose is noted to be 100-130 with her current medications  She states that she has lost 9 lbs also  Her note from Dr Alessia Schaeffre was reviewed and her Lymphoma appears to be in admission  The patient states that she has no other concerns  Her evaluation by Dr Fabio Delong is pending  The following portions of the patient's history were reviewed and updated as appropriate:   She has a past medical history of Bilateral hydronephrosis (1/24/2018), Hypertension, Left thyroid nodule (1/24/2018), Non [facility] ) (11/2/2016), and Panic attack  ,  does not have any pertinent problems on file  ,   has a past surgical history that includes No past surgeries  ,  family history includes Dementia in her mother; Parkinsonism in her mother  ,   reports that she has never smoked  She has never used smokeless tobacco  She reports that she does not drink alcohol or use drugs  ,  is allergic to clam shell and penicillins     Current Outpatient Medications   Medication Sig Dispense Refill    Blood Glucose Monitoring Suppl (FREESTYLE LITE) ASHLEY by Does not apply route 2 (two) times a day FreeStyle Lite Device (Glucometer) 1 each 0    colesevelam (WELCHOL) 625 mg tablet TAKE 3 TABLETS TWICE A DAY  180 tablet 5    FREESTYLE TEST STRIPS test strip Twice a day 200 each 3    gemfibrozil (LOPID) 600 mg tablet Take 1 tablet (600 mg total) by mouth 2 (two) times a day 60 tablet 5    glucose monitoring kit (FREESTYLE) monitoring kit 1 each by Does not apply route 2 (two) times a day 1 each 0    insulin glargine (LANTUS SOLOSTAR) 100 units/mL injection pen Inject 15 Units under the skin daily 5 pen 0    Insulin Pen Needle (COMFORT EZ PEN NEEDLES) 31G X 6 MM MISC by Does not apply route      Lancets (FREESTYLE) lancets Twice a day 200 each 2    lisinopril (ZESTRIL) 20 mg tablet Take 1 tablet (20 mg total) by mouth daily 30 tablet 2    metFORMIN (GLUCOPHAGE) 1000 MG tablet TAKE 1 TABLET (1,000 MG TOTAL) BY MOUTH 2 (TWO) TIMES A DAY WITH MEALS TAKE 1/2 IN MORNING AND 1 TABLET IN THE EVENING (Patient taking differently: Take 1,000 mg by mouth 2 (two) times a day with meals ) 60 tablet 5     No current facility-administered medications for this visit  Review of Systems   Constitutional: Negative for chills, fatigue and fever  HENT: Negative  Respiratory: Negative for cough, chest tightness and shortness of breath  Cardiovascular: Negative for chest pain, palpitations and leg swelling  Gastrointestinal: Negative for abdominal pain, constipation, diarrhea, nausea and vomiting  Genitourinary: Negative  Musculoskeletal: Negative for arthralgias, back pain and myalgias  Skin: Negative      Neurological: Negative  Psychiatric/Behavioral: Negative  Objective:  Vitals:    09/23/19 1046   BP: 124/78   BP Location: Left arm   Patient Position: Sitting   Cuff Size: Adult   Pulse: 90   Resp: 18   Temp: 98 7 °F (37 1 °C)   TempSrc: Tympanic   SpO2: 98%   Weight: 91 6 kg (202 lb)   Height: 5' (1 524 m)     Body mass index is 39 45 kg/m²  Physical Exam   Constitutional: She is oriented to person, place, and time  She appears well-developed and well-nourished  HENT:   Head: Normocephalic and atraumatic  Eyes: Pupils are equal, round, and reactive to light  EOM are normal    Neck: Normal range of motion  Neck supple  Cardiovascular: Normal rate, regular rhythm, normal heart sounds and intact distal pulses  No murmur heard  Pulmonary/Chest: Effort normal and breath sounds normal  No respiratory distress  She has no wheezes  Abdominal: Soft  Bowel sounds are normal  There is no tenderness  Musculoskeletal: Normal range of motion  She exhibits no edema  Neurological: She is alert and oriented to person, place, and time  Skin: Skin is warm and dry  Psychiatric: She has a normal mood and affect  Nursing note and vitals reviewed         PHQ-9 Depression Screening    PHQ-9:    Frequency of the following problems over the past two weeks:       Little interest or pleasure in doing things:  0 - not at all  Feeling down, depressed, or hopeless:  0 - not at all  PHQ-2 Score:  0

## 2019-10-02 ENCOUNTER — CONSULT (OUTPATIENT)
Dept: CARDIOLOGY CLINIC | Facility: CLINIC | Age: 54
End: 2019-10-02
Payer: COMMERCIAL

## 2019-10-02 VITALS
SYSTOLIC BLOOD PRESSURE: 136 MMHG | HEIGHT: 60 IN | HEART RATE: 84 BPM | WEIGHT: 203 LBS | DIASTOLIC BLOOD PRESSURE: 84 MMHG | BODY MASS INDEX: 39.85 KG/M2

## 2019-10-02 DIAGNOSIS — I48.91 ATRIAL FIBRILLATION, UNSPECIFIED TYPE (HCC): ICD-10-CM

## 2019-10-02 DIAGNOSIS — R55 SYNCOPE, UNSPECIFIED SYNCOPE TYPE: ICD-10-CM

## 2019-10-02 DIAGNOSIS — I10 BENIGN ESSENTIAL HYPERTENSION: ICD-10-CM

## 2019-10-02 DIAGNOSIS — I48.0 PAF (PAROXYSMAL ATRIAL FIBRILLATION) (HCC): Primary | ICD-10-CM

## 2019-10-02 DIAGNOSIS — E78.2 MIXED HYPERLIPIDEMIA: ICD-10-CM

## 2019-10-02 PROCEDURE — 93000 ELECTROCARDIOGRAM COMPLETE: CPT | Performed by: INTERNAL MEDICINE

## 2019-10-02 PROCEDURE — 99213 OFFICE O/P EST LOW 20 MIN: CPT | Performed by: INTERNAL MEDICINE

## 2019-10-02 NOTE — PROGRESS NOTES
[facility]     Ahmet Jefferson / 53 y o  female / : 1965 / MRN: [id number]  Date of Visit: 10/02/19    ASSESSMENT/PLAN  1  pAF   · In the setting of DKA and potential allergic reaction   · Outpatient [facility] without evidence of afib, EKG today NSR   · No anticoagulation at this time    2  HTN - controlled  · Continue lisinopril   · She discontinued metoprolol due to hypotension and lightheadedness      3  HLD   ·  during admission in August   · She reports prior statin intolerance     4  DM-2    Advised her to call us with any palpitations, chest pain, near-syncope, syncope, etc  Follow up on a PRN basis  SUBJECTIVE:  HPI: Ahmet Jefferson is a 53 y o  female who presents for follow-up regarding recent admission at [facility] for rapid afib in the setting of DKA  Prior to discharge she converted to NSR  She was against full anticoagulation so she elected to have outpatient event monitoring  She was only able to complete 1 week of monitoring due to device malfunctioning, however there was no evidence of afib  She discontinued metoprolol due to hypotension and lightheadedness and BP remains controlled today  She denies recent chest pain, dyspnea, palpitations, near-syncope or syncope  Cardiac testing:   EKG today - NSR, cannot r/o prior septal infarct, poor R wave progression, low voltage     MCOT 8/15/19 - Duration of monitoring - 7 days  No evidence of atrial fibrillation or other significant arrhythmias   1% PACs, 1% PVCs     Echo 19 - EF 50%, mild MR, mild TR       Allergies   Allergen Reactions    Clam Shell      Clam family    Statins Myalgia    Penicillins Rash         Current Outpatient Medications:     Blood Glucose Monitoring Suppl (FREESTYLE LITE) ASHLEY, by Does not apply route 2 (two) times a day FreeStyle Lite Device (Glucometer), Disp: 1 each, Rfl: 0    colesevelam (WELCHOL) 625 mg tablet, TAKE 3 TABLETS TWICE A DAY , Disp: 180 tablet, Rfl: 5    FREESTYLE TEST STRIPS test strip, Twice a day, Disp: 200 each, Rfl: 3    gemfibrozil (LOPID) 600 mg tablet, Take 1 tablet (600 mg total) by mouth 2 (two) times a day, Disp: 60 tablet, Rfl: 5    glucose monitoring kit (FREESTYLE) monitoring kit, 1 each by Does not apply route 2 (two) times a day, Disp: 1 each, Rfl: 0    insulin glargine (LANTUS SOLOSTAR) 100 units/mL injection pen, Inject 15 Units under the skin daily, Disp: 5 pen, Rfl: 0    Insulin Pen Needle (COMFORT EZ PEN NEEDLES) 31G X 6 MM MISC, by Does not apply route, Disp: , Rfl:     Lancets (FREESTYLE) lancets, Twice a day, Disp: 200 each, Rfl: 2    lisinopril (ZESTRIL) 20 mg tablet, Take 1 tablet (20 mg total) by mouth daily, Disp: 30 tablet, Rfl: 2    metFORMIN (GLUCOPHAGE) 1000 MG tablet, TAKE 1 TABLET (1,000 MG TOTAL) BY MOUTH 2 (TWO) TIMES A DAY WITH MEALS TAKE 1/2 IN MORNING AND 1 TABLET IN THE EVENING (Patient taking differently: Take 1,000 mg by mouth 2 (two) times a day with meals ), Disp: 60 tablet, Rfl: 5    Past Medical History:   Diagnosis Date    Atrial fibrillation     Bilateral hydronephrosis 1/24/2018    Diabetes mellitus, type II     Hypertension     Left thyroid nodule 1/24/2018    Non Hodgkin's lymphoma 11/02/2016    Panic attack        Family History   Problem Relation Age of Onset    Dementia Mother     Parkinsonism Mother        Past Surgical History:   Procedure Laterality Date    NO PAST SURGERIES         Social History     Socioeconomic History    Marital status: Single     Spouse name: Not on file    Number of children: Not on file    Years of education: Not on file    Highest education level: Not on file   Occupational History    Occupation: EMPLOYED   Social Needs    Financial resource strain: Not on file    Food insecurity:     Worry: Not on file     Inability: Not on file    Transportation needs:     Medical: Not on file     Non-medical: Not on file   Tobacco Use    Smoking status: Never Smoker    Smokeless tobacco: Never Used   Substance and Sexual Activity    Alcohol use: Never     Frequency: Never     Drinks per session: Patient refused     Binge frequency: Never    Drug use: No    Sexual activity: Not on file   Lifestyle    Physical activity:     Days per week: Not on file     Minutes per session: Not on file    Stress: Not on file   Relationships    Social connections:     Talks on phone: Not on file     Gets together: Not on file     Attends Rastafari service: Not on file     Active member of club or organization: Not on file     Attends meetings of clubs or organizations: Not on file     Relationship status: Not on file    Intimate partner violence:     Fear of current or ex partner: Not on file     Emotionally abused: Not on file     Physically abused: Not on file     Forced sexual activity: Not on file   Other Topics Concern    Not on file   Social History Narrative    EMPLOYED       Review of Systems   Constitution: Negative  HENT: Negative  Eyes: Negative  Cardiovascular: Negative for chest pain, claudication, dyspnea on exertion, irregular heartbeat, leg swelling, near-syncope, orthopnea, palpitations, paroxysmal nocturnal dyspnea and syncope  Respiratory: Negative for cough, shortness of breath and wheezing  Endocrine: Negative  Skin: Negative  Musculoskeletal: Negative  Gastrointestinal: Negative  Genitourinary: Negative  Neurological: Negative for dizziness and light-headedness  Psychiatric/Behavioral: Negative  OBJECTIVE:  Vitals: /84   Pulse 84   Ht 5' (1 524 m)   Wt 92 1 kg (203 lb)   BMI 39 65 kg/m²     Physical exam:   Physical Exam   Constitutional: She is oriented to person, place, and time  She appears well-developed and well-nourished  No distress  HENT:   Head: Normocephalic and atraumatic  Eyes: EOM are normal  No scleral icterus  Neck: Normal range of motion  Neck supple     Cardiovascular: Normal rate, regular rhythm, S1 normal and S2 normal    No murmur heard  Pulmonary/Chest: Effort normal and breath sounds normal  She has no wheezes  She has no rales  Abdominal: Soft  Bowel sounds are normal    Musculoskeletal: She exhibits no edema  Neurological: She is alert and oriented to person, place, and time  Skin: Skin is warm and dry  Psychiatric: She has a normal mood and affect  Her behavior is normal    Nursing note and vitals reviewed        Lab Results:   Lab Results   Component Value Date    HGBA1C 9 0 (A) 09/23/2019    HGBA1C 12 6 (A) 08/16/2019    HGBA1C 12 2 (A) 04/22/2019     No results found for: CHOL  Lab Results   Component Value Date    HDL 47 08/05/2019    HDL 49 07/09/2018     Lab Results   Component Value Date    LDLCALC 172 (H) 08/05/2019    LDLCALC 146 07/09/2018     Lab Results   Component Value Date    TRIG 222 (H) 08/05/2019    TRIG 190 (H) 07/09/2018     No results found for: CHOLHDL

## 2019-10-17 DIAGNOSIS — E11.9 TYPE 2 DIABETES MELLITUS WITHOUT COMPLICATION, WITHOUT LONG-TERM CURRENT USE OF INSULIN (HCC): ICD-10-CM

## 2019-10-17 RX ORDER — LANCETS 28 GAUGE
EACH MISCELLANEOUS
Qty: 200 EACH | Refills: 5 | Status: SHIPPED | OUTPATIENT
Start: 2019-10-17

## 2019-10-17 RX ORDER — BLOOD SUGAR DIAGNOSTIC
STRIP MISCELLANEOUS
Qty: 200 EACH | Refills: 5 | Status: SHIPPED | OUTPATIENT
Start: 2019-10-17 | End: 2020-01-27 | Stop reason: SDUPTHER

## 2019-10-18 DIAGNOSIS — E78.2 MIXED HYPERLIPIDEMIA: ICD-10-CM

## 2019-10-18 RX ORDER — GEMFIBROZIL 600 MG/1
600 TABLET, FILM COATED ORAL 2 TIMES DAILY
Qty: 60 TABLET | Refills: 5 | Status: SHIPPED | OUTPATIENT
Start: 2019-10-18 | End: 2020-04-22 | Stop reason: SDUPTHER

## 2019-10-18 RX ORDER — COLESEVELAM 180 1/1
TABLET ORAL
Qty: 180 TABLET | Refills: 5 | Status: SHIPPED | OUTPATIENT
Start: 2019-10-18 | End: 2020-04-22 | Stop reason: SDUPTHER

## 2019-10-22 PROBLEM — Z91.018 ALLERGY TO WALNUTS: Status: ACTIVE | Noted: 2019-10-22

## 2019-10-22 PROBLEM — J30.9 ALLERGIC RHINITIS: Status: ACTIVE | Noted: 2019-10-22

## 2019-10-22 PROBLEM — R21 RASH: Status: ACTIVE | Noted: 2019-10-22

## 2019-10-22 PROBLEM — T78.02XA ANAPHYLACTIC SHOCK DUE TO SHELLFISH: Status: ACTIVE | Noted: 2019-10-22

## 2019-10-22 PROBLEM — R11.2 NAUSEA AND VOMITING: Status: ACTIVE | Noted: 2019-10-22

## 2019-10-22 PROBLEM — J30.1 CHRONIC SEASONAL ALLERGIC RHINITIS DUE TO POLLEN: Status: ACTIVE | Noted: 2019-10-22

## 2019-10-22 PROBLEM — H10.13 ALLERGIC CONJUNCTIVITIS OF BOTH EYES: Status: ACTIVE | Noted: 2019-10-22

## 2019-10-23 ENCOUNTER — TELEPHONE (OUTPATIENT)
Dept: INTERNAL MEDICINE CLINIC | Facility: CLINIC | Age: 54
End: 2019-10-23

## 2019-10-24 ENCOUNTER — OFFICE VISIT (OUTPATIENT)
Dept: INTERNAL MEDICINE CLINIC | Facility: CLINIC | Age: 54
End: 2019-10-24
Payer: COMMERCIAL

## 2019-10-24 VITALS
BODY MASS INDEX: 40.29 KG/M2 | WEIGHT: 205.2 LBS | RESPIRATION RATE: 18 BRPM | OXYGEN SATURATION: 98 % | DIASTOLIC BLOOD PRESSURE: 80 MMHG | SYSTOLIC BLOOD PRESSURE: 130 MMHG | TEMPERATURE: 98.2 F | HEIGHT: 60 IN | HEART RATE: 80 BPM

## 2019-10-24 DIAGNOSIS — Z23 ENCOUNTER FOR VACCINATION: ICD-10-CM
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
## 2019-10-24 PROCEDURE — 3008F BODY MASS INDEX DOCD: CPT | Performed by: INTERNAL MEDICINE

## 2019-10-24 PROCEDURE — 99213 OFFICE O/P EST LOW 20 MIN: CPT | Performed by: INTERNAL MEDICINE

## 2019-10-24 PROCEDURE — 90715 TDAP VACCINE 7 YRS/> IM: CPT | Performed by: INTERNAL MEDICINE

## 2019-10-24 PROCEDURE — 90471 IMMUNIZATION ADMIN: CPT | Performed by: INTERNAL MEDICINE

## 2019-10-24 RX ORDER — DOXYCYCLINE HYCLATE 100 MG
100 TABLET ORAL 2 TIMES DAILY
Qty: 20 TABLET | Refills: 0 | Status: SHIPPED | OUTPATIENT
Start: 2019-10-24 | End: 2019-11-03

## 2019-10-24 RX ORDER — BLUEBERRY FLAVOR
LIQUID (ML) MISCELLANEOUS
COMMUNITY

## 2019-10-24 NOTE — PROGRESS NOTES
Assessment/Plan:  Problem List Items Addressed This Visit     None      Visit Diagnoses     Cat bite, initial encounter    -  Primary    Relevant Medications    doxycycline hyclate (VIBRA-TABS) 100 mg tablet    Other Relevant Orders    TDAP VACCINE GREATER THAN OR EQUAL TO 6YO IM    Encounter for vaccination        Relevant Orders    TDAP VACCINE GREATER THAN OR EQUAL TO 6YO IM           Diagnoses and all orders for this visit:    Cat bite, initial encounter  -     doxycycline hyclate (VIBRA-TABS) 100 mg tablet; Take 1 tablet (100 mg total) by mouth 2 (two) times a day for 10 days  -     TDAP VACCINE GREATER THAN OR EQUAL TO 6YO IM    Encounter for vaccination  -     TDAP VACCINE GREATER THAN OR EQUAL TO 6YO IM    Other orders  -     Flavoring Agent ([address]) LIQD; by Does not apply route        No problem-specific Assessment & Plan notes found for this encounter  A/P: Will up date her Td  Local wound care  Allergic to PCN and will try doxy since it doesn't look too bad  RTC as scheduled  Subjective:      Patient ID: Kathi Snyder is a 53 y o  female  WF presents for a cat bite to her right fourth finger three days ago  Pt had her cat at the vet's and cat became aggressive  Cat is up to date on her vaccines and is a house cat  No fever or chills  Minimal pain  ROM at her baseline(some prior restriction due to DJD per Pt)  Last Td was over ten years ago  The following portions of the patient's history were reviewed and updated as appropriate:   She has a past medical history of Arthritis, Atrial fibrillation, Bilateral hydronephrosis (1/24/2018), CVA (cerebral vascular accident) [facility]), Diabetes mellitus, type II, Hypertension, Left thyroid nodule (1/24/2018), Non Hodgkin's lymphoma (11/02/2016), and Panic attack  ,  does not have any pertinent problems on file  ,   has a past surgical history that includes No past surgeries  ,  family history includes Dementia in her father and mother; Parkinsonism in her mother  ,   reports that she has never smoked  She has never used smokeless tobacco  She reports that she does not drink alcohol or use drugs  ,  is allergic to clam shell; peanut oil; statins; and penicillins     Current Outpatient Medications   Medication Sig Dispense Refill    B Complex-Biotin-FA (B COMPLETE PO) Take by mouth      Bioflavonoid Products (MUKESH C PO) Take by mouth      Blood Glucose Monitoring Suppl (FREESTYLE LITE) ASHLEY by Does not apply route 2 (two) times a day FreeStyle Lite Device (Glucometer) 1 each 0    Cholecalciferol (VITAMIN D3 PO) Take by mouth      colesevelam (WELCHOL) 625 mg tablet TAKE 3 TABLETS TWICE A DAY  180 tablet 5    CRANBERRY EXTRACT PO Take by mouth      Flavoring Agent (BLUEBERRY FLAVOR) LIQD by Does not apply route      FREESTYLE TEST STRIPS test strip Twice a day 200 each 5    gemfibrozil (LOPID) 600 mg tablet TAKE 1 TABLET (600 MG TOTAL) BY MOUTH 2 (TWO) TIMES A DAY 60 tablet 5    glucose monitoring kit (FREESTYLE) monitoring kit 1 each by Does not apply route 2 (two) times a day 1 each 0    insulin glargine (LANTUS SOLOSTAR) 100 units/mL injection pen Inject 15 Units under the skin daily 5 pen 0    Insulin Pen Needle (COMFORT EZ PEN NEEDLES) 31G X 6 MM MISC by Does not apply route      Lancets (FREESTYLE) lancets Twice a day 200 each 5    lisinopril (ZESTRIL) 20 mg tablet Take 1 tablet (20 mg total) by mouth daily 30 tablet 2    metFORMIN (GLUCOPHAGE) 1000 MG tablet TAKE 1 TABLET (1,000 MG TOTAL) BY MOUTH 2 (TWO) TIMES A DAY WITH MEALS TAKE 1/2 IN MORNING AND 1 TABLET IN THE EVENING (Patient taking differently: Take 1,000 mg by mouth 2 (two) times a day with meals ) 60 tablet 5    PSYLLIUM PO Take by mouth      doxycycline hyclate (VIBRA-TABS) 100 mg tablet Take 1 tablet (100 mg total) by mouth 2 (two) times a day for 10 days 20 tablet 0     No current facility-administered medications for this visit          Review of Systems Constitutional: Negative for activity change, chills, diaphoresis, fatigue and fever  Respiratory: Negative for cough, chest tightness, shortness of breath and wheezing  Cardiovascular: Negative for chest pain, palpitations and leg swelling  Gastrointestinal: Negative for abdominal pain, constipation, diarrhea, nausea and vomiting  Genitourinary: Negative for difficulty urinating, dysuria and frequency  Musculoskeletal: Positive for arthralgias  Negative for gait problem and myalgias  Skin: Positive for wound  Neurological: Negative for light-headedness and headaches  Psychiatric/Behavioral: Negative for confusion  The patient is not nervous/anxious  PHQ-9 Depression Screening    PHQ-9:    Frequency of the following problems over the past two weeks:       Little interest or pleasure in doing things:  0 - not at all  Feeling down, depressed, or hopeless:  0 - not at all  PHQ-2 Score:  0        Objective:  Vitals:    10/24/19 1040   BP: 130/80   BP Location: Right arm   Patient Position: Sitting   Cuff Size: Large   Pulse: 80   Resp: 18   Temp: 98 2 °F (36 8 °C)   SpO2: 98%   Weight: 93 1 kg (205 lb 3 2 oz)   Height: 5' (1 524 m)     Body mass index is 40 08 kg/m²  Physical Exam   Constitutional: She is oriented to person, place, and time  She appears well-developed and well-nourished  No distress  HENT:   Head: Normocephalic and atraumatic  Mouth/Throat: Oropharynx is clear and moist    Eyes: Pupils are equal, round, and reactive to light  Conjunctivae and EOM are normal    Neurological: She is alert and oriented to person, place, and time  Skin:   Right lateral proximal finger with several scabbed puncture wounds with some surrounding ecchymosis, but no increase temp, erythema, or areas of fluctuation, or induration  ROM wnl with flexion decreased 10%, but pt reports as her baseline  Good capillary refill  Psychiatric: She has a normal mood and affect   Her behavior is normal  Judgment and thought content normal    Nursing note and vitals reviewed

## 2019-10-24 NOTE — PATIENT INSTRUCTIONS
Animal Bite   WHAT YOU NEED TO KNOW:   What do I need to know about an animal bite? Animal bite injuries range from shallow cuts to deep, life-threatening wounds  An animal can cut or puncture the skin when it bites  Your skin may be torn from your body  Your skin may swell or bruise even if the bite does not break the skin  Animal bites occur more often on the hands, arms, legs, and face  Bites from dogs and cats are the most common injuries  What does my healthcare provider need to know about my animal bite? · What kind of animal bit you? Is the animal a pet? If so, are its vaccines updated? · When and where did the bite happen? Was the animal bothered by you or another person before it bit? Did the animal show any fear? · Can the animal be brought in to watch it for sickness or disease? · Has the wound been treated? If so, what did you use to treat it? · Do you have any health conditions? Do you currently take any medicines? When was your last tetanus shot? What tests may I need after an animal bite? Your healthcare provider will look at how big and deep the bite wounds are  He will ask if any area feels numb  Your healthcare provider will check how well you can move the bitten area  He will also check for signs of infection  You may also need the following:  · Blood tests and a sample of fluid or tissue from your wound  may show if you have an infection  · An x-ray  may show fractures or foreign objects in your wound  How is an animal bite treated? · Irrigation and debridement  may be needed to clean out your wound  Dead, damaged, or infected tissue may be cut away to help your wound heal     · Medicines:      ¨ Antibiotics  prevent or treat a bacterial infection  ¨ Prescription pain medicine  may be given  Ask how to take this medicine safely  ¨ A tetanus vaccine  may be needed to prevent tetanus   Tetanus is a life-threatening bacterial infection that affects the nerves and muscles  The bacteria can be spread through animal bites  ¨ A rabies vaccine  may be needed to prevent rabies  Rabies is a life-threatening viral infection  The virus can be spread through animal bites  · Stitches  may be needed if your wound is large and not infected  · Surgery  may be needed to repair deep injuries or severe wounds  What can I do to manage my symptoms? · Apply antibiotic ointment as directed  This helps prevent infection in minor skin wounds  It is available without a doctor's order  · Keep the wound clean and covered  Wash the wound every day with soap and water or germ-killing cleanser  Ask your healthcare provider about the kinds of bandages to use  · Apply ice on your wound  Ice helps decrease swelling and pain  Ice may also help prevent tissue damage  Use an ice pack, or put crushed ice in a plastic bag  Cover it with a towel and place it on your wound for 15 to 20 minutes every hour or as directed  · Elevate the wound area  Raise your wound above the level of your heart as often as you can  This will help decrease swelling and pain  Prop your wound on pillows or blankets to keep it elevated comfortably  What can I do to prevent an animal bite? · Learn to recognize the signs of a scared pet  Avoid quick, sudden movements  · Do not step between animals that are fighting  · Do not leave a pet alone with a young child  · Do not disturb an animal while it eats, sleeps, or cares for its young  · Do not approach an animal you do not know, especially one that is tied up or caged  · Stay away from animals that seem sick or act strangely  · Do not feed or capture wild animals  When should I seek immediate care? · You have a fever  · Your wound is red, swollen, and draining pus  · You see red streaks on the skin around the wound  · You can no longer move the bitten area      · Your heartbeat and breathing are much faster than usual     · You feel dizzy and confused  When should I contact my healthcare provider? · Your pain does not get better, even after you take pain medicine  · You have nightmares or flashbacks about the animal bite  · You have questions or concerns about your condition or care  CARE AGREEMENT:   You have the right to help plan your care  Learn about your health condition and how it may be treated  Discuss treatment options with your caregivers to decide what care you want to receive  You always have the right to refuse treatment  The above information is an  only  It is not intended as medical advice for individual conditions or treatments  Talk to your doctor, nurse or pharmacist before following any medical regimen to see if it is safe and effective for you  © 2017 [facility] Information is for End User's use only and may not be sold, redistributed or otherwise used for commercial purposes  All illustrations and images included in CareNotes® are the copyrighted property of [employer]  or David Morton

## 2019-12-18 DIAGNOSIS — I10 BENIGN ESSENTIAL HYPERTENSION: ICD-10-CM

## 2019-12-18 PROCEDURE — 4010F ACE/ARB THERAPY RXD/TAKEN: CPT | Performed by: INTERNAL MEDICINE

## 2019-12-18 RX ORDER — LISINOPRIL 20 MG/1
20 TABLET ORAL DAILY
Qty: 30 TABLET | Refills: 2 | Status: SHIPPED | OUTPATIENT
Start: 2019-12-18 | End: 2020-03-26

## 2020-01-02 ENCOUNTER — TRANSCRIBE ORDERS (OUTPATIENT)
Dept: LAB | Facility: HOSPITAL | Age: 55
End: 2020-01-02

## 2020-01-02 ENCOUNTER — APPOINTMENT (OUTPATIENT)
Dept: LAB | Facility: HOSPITAL | Age: 55
End: 2020-01-02
Attending: INTERNAL MEDICINE
Payer: COMMERCIAL

## 2020-01-02 DIAGNOSIS — C83.30 DIFFUSE LARGE B-CELL LYMPHOMA, UNSPECIFIED BODY REGION (HCC): ICD-10-CM

## 2020-01-02 DIAGNOSIS — C83.30 DIFFUSE LARGE B-CELL LYMPHOMA, UNSPECIFIED BODY REGION (HCC): Primary | ICD-10-CM

## 2020-01-02 LAB
ALBUMIN SERPL BCP-MCNC: 4.7 G/DL (ref 3.5–5.7)
ALP SERPL-CCNC: 119 U/L (ref 40–150)
ALT SERPL W P-5'-P-CCNC: 25 U/L (ref 7–52)
ANION GAP SERPL CALCULATED.3IONS-SCNC: 9 MMOL/L (ref 4–13)
AST SERPL W P-5'-P-CCNC: 26 U/L (ref 13–39)
BASOPHILS # BLD AUTO: 0.1 THOUSANDS/ΜL (ref 0–0.1)
BASOPHILS NFR BLD AUTO: 1 % (ref 0–2)
BILIRUB SERPL-MCNC: 0.4 MG/DL (ref 0.2–1)
BUN SERPL-MCNC: 24 MG/DL (ref 7–25)
CALCIUM SERPL-MCNC: 9.9 MG/DL (ref 8.6–10.5)
CHLORIDE SERPL-SCNC: 103 MMOL/L (ref 98–107)
CO2 SERPL-SCNC: 22 MMOL/L (ref 21–31)
CREAT SERPL-MCNC: 0.76 MG/DL (ref 0.6–1.2)
EOSINOPHIL # BLD AUTO: 0.4 THOUSAND/ΜL (ref 0–0.61)
EOSINOPHIL NFR BLD AUTO: 5 % (ref 0–5)
ERYTHROCYTE [DISTWIDTH] IN BLOOD BY AUTOMATED COUNT: 13.2 % (ref 11.5–14.5)
GFR SERPL CREATININE-BSD FRML MDRD: 89 ML/MIN/1.73SQ M
GLUCOSE P FAST SERPL-MCNC: 165 MG/DL (ref 65–99)
HCT VFR BLD AUTO: 41 % (ref 42–47)
HGB BLD-MCNC: 13.6 G/DL (ref 12–16)
LYMPHOCYTES # BLD AUTO: 2.2 THOUSANDS/ΜL (ref 0.6–4.47)
LYMPHOCYTES NFR BLD AUTO: 29 % (ref 21–51)
MCH RBC QN AUTO: 30.8 PG (ref 26–34)
MCHC RBC AUTO-ENTMCNC: 33.3 G/DL (ref 31–37)
MCV RBC AUTO: 93 FL (ref 81–99)
MONOCYTES # BLD AUTO: 0.4 THOUSAND/ΜL (ref 0.17–1.22)
MONOCYTES NFR BLD AUTO: 5 % (ref 2–12)
NEUTROPHILS # BLD AUTO: 4.4 THOUSANDS/ΜL (ref 1.4–6.5)
NEUTS SEG NFR BLD AUTO: 60 % (ref 42–75)
PLATELET # BLD AUTO: 400 THOUSANDS/UL (ref 149–390)
PMV BLD AUTO: 7.9 FL (ref 8.6–11.7)
POTASSIUM SERPL-SCNC: 4.6 MMOL/L (ref 3.5–5.5)
PROT SERPL-MCNC: 8 G/DL (ref 6.4–8.9)
RBC # BLD AUTO: 4.42 MILLION/UL (ref 3.9–5.2)
SODIUM SERPL-SCNC: 134 MMOL/L (ref 134–143)
WBC # BLD AUTO: 7.3 THOUSAND/UL (ref 4.8–10.8)

## 2020-01-02 PROCEDURE — 36415 COLL VENOUS BLD VENIPUNCTURE: CPT

## 2020-01-02 PROCEDURE — 85025 COMPLETE CBC W/AUTO DIFF WBC: CPT

## 2020-01-02 PROCEDURE — 80053 COMPREHEN METABOLIC PANEL: CPT

## 2020-01-27 ENCOUNTER — OFFICE VISIT (OUTPATIENT)
Dept: INTERNAL MEDICINE CLINIC | Facility: CLINIC | Age: 55
End: 2020-01-27
Payer: COMMERCIAL

## 2020-01-27 VITALS
SYSTOLIC BLOOD PRESSURE: 140 MMHG | RESPIRATION RATE: 16 BRPM | TEMPERATURE: 98.9 F | OXYGEN SATURATION: 98 % | WEIGHT: 220 LBS | BODY MASS INDEX: 43.19 KG/M2 | HEIGHT: 60 IN | DIASTOLIC BLOOD PRESSURE: 86 MMHG | HEART RATE: 89 BPM

## 2020-01-27 DIAGNOSIS — I48.0 PAF (PAROXYSMAL ATRIAL FIBRILLATION) (HCC): ICD-10-CM

## 2020-01-27 DIAGNOSIS — M70.71 BURSITIS OF OTHER BURSA OF RIGHT HIP: ICD-10-CM

## 2020-01-27 DIAGNOSIS — E11.10 DKA (DIABETIC KETOACIDOSES): ICD-10-CM

## 2020-01-27 DIAGNOSIS — I10 BENIGN ESSENTIAL HYPERTENSION: ICD-10-CM

## 2020-01-27 DIAGNOSIS — E11.9 TYPE 2 DIABETES MELLITUS WITHOUT COMPLICATION, WITHOUT LONG-TERM CURRENT USE OF INSULIN (HCC): Primary | ICD-10-CM

## 2020-01-27 LAB — SL AMB POCT HEMOGLOBIN AIC: 8 (ref ?–6.5)

## 2020-01-27 PROCEDURE — 3052F HG A1C>EQUAL 8.0%<EQUAL 9.0%: CPT | Performed by: INTERNAL MEDICINE

## 2020-01-27 PROCEDURE — 3008F BODY MASS INDEX DOCD: CPT | Performed by: INTERNAL MEDICINE

## 2020-01-27 PROCEDURE — 99214 OFFICE O/P EST MOD 30 MIN: CPT | Performed by: INTERNAL MEDICINE

## 2020-01-27 PROCEDURE — 83036 HEMOGLOBIN GLYCOSYLATED A1C: CPT | Performed by: INTERNAL MEDICINE

## 2020-01-27 RX ORDER — NATEGLINIDE 60 MG/1
TABLET ORAL
Qty: 60 TABLET | Refills: 5 | Status: SHIPPED | OUTPATIENT
Start: 2020-01-27 | End: 2020-02-17

## 2020-01-27 RX ORDER — BLOOD SUGAR DIAGNOSTIC
STRIP MISCELLANEOUS
Qty: 200 EACH | Refills: 5 | Status: SHIPPED | OUTPATIENT
Start: 2020-01-27 | End: 2021-03-02

## 2020-01-27 RX ORDER — GLUCOSAM/CHON-MSM1/C/MANG/BOSW 750-644 MG
TABLET ORAL
COMMUNITY
Start: 2019-08-01 | End: 2020-05-19

## 2020-01-27 RX ORDER — NAPROXEN 500 MG/1
500 TABLET ORAL 2 TIMES DAILY PRN
Qty: 28 TABLET | Refills: 0 | Status: SHIPPED | OUTPATIENT
Start: 2020-01-27 | End: 2020-02-17

## 2020-01-27 NOTE — PROGRESS NOTES
Diabetic Foot Exam    Patient's shoes and socks removed  Right Foot/Ankle   Right Foot Inspection  Skin Exam: skin normal and skin intact no dry skin, no warmth, no callus, no erythema, no maceration, no abnormal color, no pre-ulcer, no ulcer and no callus                          Toe Exam: ROM and strength within normal limits no right toe deformity  Sensory       Monofilament testing: intact  Vascular  Capillary refills: < 3 seconds  The right DP pulse is 2+  The right PT pulse is 2+  Left Foot/Ankle  Left Foot Inspection  Skin Exam: skin normal and skin intactno dry skin, no warmth, no erythema, no maceration, normal color, no pre-ulcer, no ulcer and no callus                         Toe Exam: ROM and strength within normal limitsno left toe deformity                   Sensory       Monofilament: intact  Vascular  Capillary refills: < 3 seconds  The left DP pulse is 2+  The left PT pulse is 2+  Assign Risk Category:  No deformity present; No loss of protective sensation;  No weak pulses       Risk: 0

## 2020-01-27 NOTE — PROGRESS NOTES
Assessment/Plan:    Problem List Items Addressed This Visit        Endocrine    Type 2 diabetes mellitus without complication, without long-term current use of insulin ([facility]) - Primary    Relevant Medications    Insulin Pen Needle (COMFORT EZ PEN NEEDLES) 31G X 6 MM MISC    insulin glargine (LANTUS SOLOSTAR) 100 units/mL injection pen    nateglinide (STARLIX) 60 mg tablet    FREESTYLE TEST STRIPS test strip    Other Relevant Orders    POCT hemoglobin A1c (Completed)       Cardiovascular and Mediastinum    Benign essential hypertension    PAF (paroxysmal atrial fibrillation) ([facility])      Other Visit Diagnoses     DKA (diabetic ketoacidoses) ([facility])        Relevant Medications    insulin glargine (LANTUS SOLOSTAR) 100 units/mL injection pen    nateglinide (STARLIX) 60 mg tablet    Bursitis of other bursa of right hip        Relevant Medications    naproxen (NAPROSYN) 500 mg tablet           Diagnoses and all orders for this visit:    Type 2 diabetes mellitus without complication, without long-term current use of insulin ([facility])  -     POCT hemoglobin A1c  -     Insulin Pen Needle (COMFORT EZ PEN NEEDLES) 31G X 6 MM MISC; by Does not apply route daily  -     nateglinide (STARLIX) 60 mg tablet; One Tabs PO QLunch and QDinner  -     FREESTYLE TEST STRIPS test strip; Twice a day    DKA (diabetic ketoacidoses) ([facility])  -     insulin glargine (LANTUS SOLOSTAR) 100 units/mL injection pen; Inject 15 Units under the skin daily    Bursitis of other bursa of right hip  -     naproxen (NAPROSYN) 500 mg tablet; Take 1 tablet (500 mg total) by mouth 2 (two) times a day as needed for mild pain (right hip) for up to 14 days    Benign essential hypertension    PAF (paroxysmal atrial fibrillation) ([facility])    Other orders  -     Misc Natural Products (OSTEO BI-FLEX ADV TRIPLE ST) TABS  -     Turmeric 1053 MG TABS; daily  -     Ginger, Zingiber officinalis, (GINGER PO);  Take by mouth daily        No problem-specific Assessment & Plan notes found for this encounter  We will try the patient on NSAIDs and see how she does with her hip pain  Subjective:      Patient ID: Ever Mckeon is a 63 y o  female  The patient is concerned regarding the elevated Hgba1c of 8 0%  The patient notes she is using the Metfromin and Lantus  She states that her AM glucometer reading are noted to be around averaging around 120  I noted that the patient should consider treatment around with meals to help with post prandial   The patient states that she has pain in the left hip      The following portions of the patient's history were reviewed and updated as appropriate:   She has a past medical history of Arthritis, Atrial fibrillation, Bilateral hydronephrosis (1/24/2018), CVA (cerebral vascular accident) [facility]), Diabetes mellitus, type II, Hypertension, Left thyroid nodule (1/24/2018), Non Hodgkin's lymphoma (11/02/2016), and Panic attack  ,  does not have any pertinent problems on file  ,   has a past surgical history that includes No past surgeries  ,  family history includes Dementia in her father and mother; Parkinsonism in her mother  ,   reports that she has never smoked  She has never used smokeless tobacco  She reports that she does not drink alcohol or use drugs  ,  is allergic to clam shell; peanut oil; statins; and penicillins     Current Outpatient Medications   Medication Sig Dispense Refill    B Complex-Biotin-FA (B COMPLETE PO) Take by mouth      Blood Glucose Monitoring Suppl (FREESTYLE LITE) ASHLEY by Does not apply route 2 (two) times a day FreeStyle Lite Device (Glucometer) 1 each 0    Cholecalciferol (VITAMIN D3 PO) Take by mouth      colesevelam (WELCHOL) 625 mg tablet TAKE 3 TABLETS TWICE A DAY   180 tablet 5    CRANBERRY EXTRACT PO Take by mouth      Flavoring Agent (BLUEBERRY FLAVOR) LIQD by Does not apply route      FREESTYLE TEST STRIPS test strip Twice a day 200 each 5    gemfibrozil (LOPID) 600 mg tablet TAKE 1 TABLET (600 MG TOTAL) BY MOUTH 2 (TWO) TIMES A DAY 60 tablet 5    Ginger, Zingiber officinalis, (GINGER PO) Take by mouth daily      glucose monitoring kit (FREESTYLE) monitoring kit 1 each by Does not apply route 2 (two) times a day 1 each 0    insulin glargine (LANTUS SOLOSTAR) 100 units/mL injection pen Inject 15 Units under the skin daily 5 pen 3    Insulin Pen Needle (COMFORT EZ PEN NEEDLES) 31G X 6 MM MISC by Does not apply route daily 100 each 3    Lancets (FREESTYLE) lancets Twice a day 200 each 5    lisinopril (ZESTRIL) 20 mg tablet TAKE 1 TABLET (20 MG TOTAL) BY MOUTH DAILY 30 tablet 2    metFORMIN (GLUCOPHAGE) 1000 MG tablet TAKE 1 TABLET (1,000 MG TOTAL) BY MOUTH 2 (TWO) TIMES A DAY WITH MEALS TAKE 1/2 IN MORNING AND 1 TABLET IN THE EVENING (Patient taking differently: Take 1,000 mg by mouth 2 (two) times a day with meals ) 60 tablet 5    Misc Natural Products (OSTEO BI-FLEX ADV TRIPLE ST) TABS       PSYLLIUM PO Take by mouth      Turmeric 1053 MG TABS daily      naproxen (NAPROSYN) 500 mg tablet Take 1 tablet (500 mg total) by mouth 2 (two) times a day as needed for mild pain (right hip) for up to 14 days 28 tablet 0    nateglinide (STARLIX) 60 mg tablet One Tabs PO QLunch and QDinner 60 tablet 5     No current facility-administered medications for this visit  Review of Systems   Constitutional: Negative for chills, fatigue and fever  HENT: Negative  Respiratory: Negative for cough, chest tightness and shortness of breath  Cardiovascular: Negative for chest pain, palpitations and leg swelling  Gastrointestinal: Negative for abdominal pain, constipation, diarrhea, nausea and vomiting  Genitourinary: Negative  Musculoskeletal: Negative for arthralgias, back pain and myalgias  Skin: Negative  Neurological: Negative  Psychiatric/Behavioral: Negative            Objective:  Vitals:    01/27/20 1026   BP: 140/86   BP Location: Right arm   Patient Position: Sitting   Cuff Size: Large   Pulse: 89   Resp: 16   Temp: 98 9 °F (37 2 °C)   SpO2: 98%   Weight: 99 8 kg (220 lb)   Height: 5' (1 524 m)     Body mass index is 42 97 kg/m²  Physical Exam   Constitutional: She is oriented to person, place, and time  She appears well-developed and well-nourished  HENT:   Head: Normocephalic and atraumatic  Eyes: Pupils are equal, round, and reactive to light  EOM are normal    Neck: Normal range of motion  Neck supple  Cardiovascular: Normal rate, regular rhythm, normal heart sounds and intact distal pulses  No murmur heard  Pulmonary/Chest: Effort normal and breath sounds normal  No respiratory distress  She has no wheezes  Abdominal: Soft  Bowel sounds are normal  There is no tenderness  Musculoskeletal: Normal range of motion  She exhibits no edema  Neurological: She is alert and oriented to person, place, and time  Skin: Skin is warm and dry  Psychiatric: She has a normal mood and affect  Nursing note and vitals reviewed         PHQ-9 Depression Screening    PHQ-9:    Frequency of the following problems over the past two weeks:

## 2020-02-17 ENCOUNTER — OFFICE VISIT (OUTPATIENT)
Dept: INTERNAL MEDICINE CLINIC | Facility: CLINIC | Age: 55
End: 2020-02-17
Payer: COMMERCIAL

## 2020-02-17 VITALS
TEMPERATURE: 99.6 F | RESPIRATION RATE: 18 BRPM | DIASTOLIC BLOOD PRESSURE: 84 MMHG | HEART RATE: 90 BPM | SYSTOLIC BLOOD PRESSURE: 132 MMHG | OXYGEN SATURATION: 99 % | WEIGHT: 217 LBS | BODY MASS INDEX: 42.6 KG/M2 | HEIGHT: 60 IN

## 2020-02-17 DIAGNOSIS — Z00.00 PE (PHYSICAL EXAM), ANNUAL: Primary | ICD-10-CM

## 2020-02-17 DIAGNOSIS — E66.01 MORBID OBESITY WITH BMI OF 40.0-44.9, ADULT (HCC): ICD-10-CM

## 2020-02-17 PROBLEM — R11.2 NAUSEA AND VOMITING: Status: RESOLVED | Noted: 2019-10-22 | Resolved: 2020-02-17

## 2020-02-17 PROBLEM — N13.30 BILATERAL HYDRONEPHROSIS: Status: RESOLVED | Noted: 2018-01-24 | Resolved: 2020-02-17

## 2020-02-17 PROCEDURE — 99213 OFFICE O/P EST LOW 20 MIN: CPT | Performed by: INTERNAL MEDICINE

## 2020-02-17 NOTE — PATIENT INSTRUCTIONS
Obesity   AMBULATORY CARE:   Obesity  is when your body mass index (BMI) is greater than 30  Your healthcare provider will use your height and weight to measure your BMI  The risks of obesity include  many health problems, such as injuries or physical disability  You may need tests to check for the following:  · Diabetes     · High blood pressure or high cholesterol     · Heart disease     · Gallbladder or liver disease     · Cancer of the colon, breast, prostate, liver, or kidney     · Sleep apnea     · Arthritis or gout  Seek care immediately if:   · You have a severe headache, confusion, or difficulty speaking  · You have weakness on one side of your body  · You have chest pain, sweating, or shortness of breath  Contact your healthcare provider if:   · You have symptoms of gallbladder or liver disease, such as pain in your upper abdomen  · You have knee or hip pain and discomfort while walking  · You have symptoms of diabetes, such as intense hunger and thirst, and frequent urination  · You have symptoms of sleep apnea, such as snoring or daytime sleepiness  · You have questions or concerns about your condition or care  Treatment for obesity  focuses on helping you lose weight to improve your health  Even a small decrease in BMI can reduce the risk for many health problems  Your healthcare provider will help you set a weight-loss goal   · Lifestyle changes  are the first step in treating obesity  These include making healthy food choices and getting regular physical activity  Your healthcare provider may suggest a weight-loss program that involves coaching, education, and therapy  · Medicine  may help you lose weight when it is used with a healthy diet and physical activity  · Surgery  can help you lose weight if you are very obese and have other health problems  There are several types of weight-loss surgery  Ask your healthcare provider for more information    Be successful losing weight:   · Set small, realistic goals  An example of a small goal is to walk for 20 minutes 5 days a week  Anther goal is to lose 5% of your body weight  · Tell friends, family members, and coworkers about your goals  and ask for their support  Ask a friend to lose weight with you, or join a weight-loss support group  · Identify foods or triggers that may cause you to overeat , and find ways to avoid them  Remove tempting high-calorie foods from your home and workplace  Place a bowl of fresh fruit on your kitchen counter  If stress causes you to eat, then find other ways to cope with stress  · Keep a diary to track what you eat and drink  Also write down how many minutes of physical activity you do each day  Weigh yourself once a week and record it in your diary  Eating changes: You will need to eat 500 to 1,000 fewer calories each day than you currently eat to lose 1 to 2 pounds a week  The following changes will help you cut calories:  · Eat smaller portions  Use small plates, no larger than 9 inches in diameter  Fill your plate half full of fruits and vegetables  Measure your food using measuring cups until you know what a serving size looks like  · Eat 3 meals and 1 or 2 snacks each day  Plan your meals in advance  Devon Mustache and eat at home most of the time  Eat slowly  · Eat fruits and vegetables at every meal   They are low in calories and high in fiber, which makes you feel full  Do not add butter, margarine, or cream sauce to vegetables  Use herbs to season steamed vegetables  · Eat less fat and fewer fried foods  Eat more baked or grilled chicken and fish  These protein sources are lower in calories and fat than red meat  Limit fast food  Dress your salads with olive oil and vinegar instead of bottled dressing  · Limit the amount of sugar you eat  Do not drink sugary beverages  Limit alcohol  Activity changes:  Physical activity is good for your body in many ways   It helps you burn calories and build strong muscles  It decreases stress and depression, and improves your mood  It can also help you sleep better  Talk to your healthcare provider before you begin an exercise program   · Exercise for at least 30 minutes 5 days a week  Start slowly  Set aside time each day for physical activity that you enjoy and that is convenient for you  It is best to do both weight training and an activity that increases your heart rate, such as walking, bicycling, or swimming  · Find ways to be more active  Do yard work and housecleaning  Walk up the stairs instead of using elevators  Spend your leisure time going to events that require walking, such as outdoor festivals or fairs  This extra physical activity can help you lose weight and keep it off  Follow up with your healthcare provider as directed: You may need to meet with a dietitian  Write down your questions so you remember to ask them during your visits  © 2017 [employer] Information is for End User's use only and may not be sold, redistributed or otherwise used for commercial purposes  All illustrations and images included in CareNotes® are the copyrighted property of [employer]  or David Morton  The above information is an  only  It is not intended as medical advice for individual conditions or treatments  Talk to your doctor, nurse or pharmacist before following any medical regimen to see if it is safe and effective for you

## 2020-02-17 NOTE — PROGRESS NOTES
Assessment/Plan:    Problem List Items Addressed This Visit     None      Visit Diagnoses     PE (physical exam), annual    -  Primary    Morbid obesity with BMI of 40 0-44 9, adult ([facility] )               Diagnoses and all orders for this visit:    PE (physical exam), annual    Morbid obesity with BMI of 40 0-44 9, adult ([facility] )        No problem-specific Assessment & Plan notes found for this encounter  The patient was seen and examined and noted to have issues with an elevated     Subjective:      Patient ID: Charles Keys is a 63 y o  female  The patient was seen and examine and is here for a [facility] PE  The patient states that she has a mild URI  Her last HgbA1c was noted to be elevated  The patient states she is working on diet and exercise to control this and is not interested in medication  The following portions of the patient's history were reviewed and updated as appropriate:   She has a past medical history of Acquired bowing of left tibia, Bilateral hydronephrosis (1/24/2018), CVA (cerebral vascular accident) [facility]), Left thyroid nodule (1/24/2018), and Non Hodgkin's lymphoma (11/02/2016)  ,  does not have any pertinent problems on file  ,   has a past surgical history that includes No past surgeries; Tonsillectomy; IR port Placement; and IR port Removal ,  family history includes Dementia in her father and mother; Parkinsonism in her mother  ,   reports that she has never smoked  She has never used smokeless tobacco  She reports that she does not drink alcohol or use drugs  ,  is allergic to clam shell; peanut oil; statins; and penicillins     Current Outpatient Medications   Medication Sig Dispense Refill    B Complex-Biotin-FA (B COMPLETE PO) Take by mouth      Blood Glucose Monitoring Suppl (FREESTYLE LITE) ASHLEY by Does not apply route 2 (two) times a day FreeStyle Lite Device (Glucometer) 1 each 0    Cholecalciferol (VITAMIN D3 PO) Take by mouth      colesevelam (WELCHOL) 625 mg tablet TAKE 3 TABLETS TWICE A DAY  180 tablet 5    CRANBERRY EXTRACT PO Take by mouth      Flavoring Agent (BLUEBERRY FLAVOR) LIQD by Does not apply route      FREESTYLE TEST STRIPS test strip Twice a day 200 each 5    gemfibrozil (LOPID) 600 mg tablet TAKE 1 TABLET (600 MG TOTAL) BY MOUTH 2 (TWO) TIMES A DAY 60 tablet 5    Leanna, Zingiber officinalis, (LEANNA PO) Take by mouth daily      glucose monitoring kit (FREESTYLE) monitoring kit 1 each by Does not apply route 2 (two) times a day 1 each 0    insulin glargine (LANTUS SOLOSTAR) 100 units/mL injection pen Inject 15 Units under the skin daily 5 pen 3    Insulin Pen Needle (COMFORT EZ PEN NEEDLES) 31G X 6 MM MISC by Does not apply route daily 100 each 3    Lancets (FREESTYLE) lancets Twice a day 200 each 5    lisinopril (ZESTRIL) 20 mg tablet TAKE 1 TABLET (20 MG TOTAL) BY MOUTH DAILY 30 tablet 2    metFORMIN (GLUCOPHAGE) 1000 MG tablet TAKE 1 TABLET (1,000 MG TOTAL) BY MOUTH 2 (TWO) TIMES A DAY WITH MEALS TAKE 1/2 IN MORNING AND 1 TABLET IN THE EVENING (Patient taking differently: Take 1,000 mg by mouth 2 (two) times a day with meals ) 60 tablet 5    Misc Natural Products (OSTEO BI-FLEX ADV TRIPLE ST) TABS       PSYLLIUM PO Take by mouth      Turmeric 1053 MG TABS daily       No current facility-administered medications for this visit  Review of Systems   Constitutional: Negative for chills, fatigue and fever  HENT: Negative  Respiratory: Negative for cough, chest tightness and shortness of breath  Cardiovascular: Negative for chest pain, palpitations and leg swelling  Gastrointestinal: Negative for abdominal pain, constipation, diarrhea, nausea and vomiting  Genitourinary: Negative  Musculoskeletal: Negative for arthralgias, back pain and myalgias  Skin: Negative  Neurological: Negative  Psychiatric/Behavioral: Negative            Objective:  Vitals:    02/17/20 0725   BP: 132/84   BP Location: Right arm   Patient Position: Sitting Cuff Size: Large   Pulse: 90   Resp: 18   Temp: 99 6 °F (37 6 °C)   SpO2: 99%   Weight: 98 4 kg (217 lb)   Height: 5' (1 524 m)     Body mass index is 42 38 kg/m²  Physical Exam   Constitutional: She is oriented to person, place, and time  She appears well-developed and well-nourished  HENT:   Head: Normocephalic and atraumatic  Eyes: Pupils are equal, round, and reactive to light  EOM are normal    Neck: Normal range of motion  Neck supple  Cardiovascular: Normal rate, regular rhythm, normal heart sounds and intact distal pulses  No murmur heard  Pulmonary/Chest: Effort normal and breath sounds normal  No respiratory distress  She has no wheezes  Abdominal: Soft  Bowel sounds are normal  There is no tenderness  Musculoskeletal: Normal range of motion  She exhibits no edema  Neurological: She is alert and oriented to person, place, and time  Skin: Skin is warm and dry  Psychiatric: She has a normal mood and affect  Nursing note and vitals reviewed  PHQ-9 Depression Screening    PHQ-9:    Frequency of the following problems over the past two weeks:              BMI Counseling: Body mass index is 42 38 kg/m²  The BMI is above normal  Nutrition recommendations include reducing portion sizes

## 2020-02-19 DIAGNOSIS — E11.9 TYPE 2 DIABETES MELLITUS WITHOUT COMPLICATION, WITHOUT LONG-TERM CURRENT USE OF INSULIN (HCC): ICD-10-CM

## 2020-03-17 ENCOUNTER — TELEPHONE (OUTPATIENT)
Dept: ADMINISTRATIVE | Facility: OTHER | Age: 55
End: 2020-03-17

## 2020-03-17 NOTE — TELEPHONE ENCOUNTER
Upon review of your request/inquiry, we have found this is a duplicate request and no further action is needed  This request was completed upon initial request, the patient chart is up to date, and this message will now be closed  Any additional questions or concerns should be emailed to the Practice Liaisons via [email or web address] email, please do not reply via In Basket       Thank you  Saqib Ramirez

## 2020-03-17 NOTE — TELEPHONE ENCOUNTER
----- Message from Chuck Bauman sent at 3/17/2020  1:09 PM EDT -----  Regarding: colonoscopy  03/17/20 1:09 PM    Hello, our patient Kathi Snyder has had CRC: Colonoscopy completed/performed  Please assist in updating the patient chart by pulling the document from the Media Tab  The date of service is 11/18/2014    Also pathology report scanned 1/27/2020       Thank you,  Vic Wagner MA  [facility]

## 2020-03-26 DIAGNOSIS — I10 BENIGN ESSENTIAL HYPERTENSION: ICD-10-CM

## 2020-03-26 RX ORDER — LISINOPRIL 20 MG/1
20 TABLET ORAL DAILY
Qty: 30 TABLET | Refills: 5 | Status: SHIPPED | OUTPATIENT
Start: 2020-03-26 | End: 2020-08-17

## 2020-04-22 DIAGNOSIS — E78.2 MIXED HYPERLIPIDEMIA: ICD-10-CM

## 2020-04-22 DIAGNOSIS — E11.9 TYPE 2 DIABETES MELLITUS WITHOUT COMPLICATION, WITHOUT LONG-TERM CURRENT USE OF INSULIN (HCC): ICD-10-CM

## 2020-04-22 RX ORDER — COLESEVELAM 180 1/1
TABLET ORAL
Qty: 180 TABLET | Refills: 5 | Status: SHIPPED | OUTPATIENT
Start: 2020-04-22 | End: 2020-11-16

## 2020-04-22 RX ORDER — GEMFIBROZIL 600 MG/1
600 TABLET, FILM COATED ORAL 2 TIMES DAILY
Qty: 60 TABLET | Refills: 5 | Status: SHIPPED | OUTPATIENT
Start: 2020-04-22 | End: 2020-10-09

## 2020-05-19 ENCOUNTER — OFFICE VISIT (OUTPATIENT)
Dept: INTERNAL MEDICINE CLINIC | Facility: CLINIC | Age: 55
End: 2020-05-19
Payer: COMMERCIAL

## 2020-05-19 VITALS
BODY MASS INDEX: 43.15 KG/M2 | RESPIRATION RATE: 18 BRPM | SYSTOLIC BLOOD PRESSURE: 134 MMHG | TEMPERATURE: 99.4 F | WEIGHT: 219.8 LBS | HEART RATE: 108 BPM | HEIGHT: 60 IN | DIASTOLIC BLOOD PRESSURE: 82 MMHG | OXYGEN SATURATION: 96 %

## 2020-05-19 DIAGNOSIS — F41.1 GENERALIZED ANXIETY DISORDER: ICD-10-CM

## 2020-05-19 DIAGNOSIS — E11.10 DKA (DIABETIC KETOACIDOSES): ICD-10-CM

## 2020-05-19 DIAGNOSIS — C85.90 NON-HODGKIN'S LYMPHOMA, UNSPECIFIED BODY REGION, UNSPECIFIED NON-HODGKIN LYMPHOMA TYPE (HCC): ICD-10-CM

## 2020-05-19 DIAGNOSIS — E11.9 TYPE 2 DIABETES MELLITUS WITHOUT COMPLICATION, WITHOUT LONG-TERM CURRENT USE OF INSULIN (HCC): Primary | ICD-10-CM

## 2020-05-19 DIAGNOSIS — I10 BENIGN ESSENTIAL HYPERTENSION: ICD-10-CM

## 2020-05-19 LAB — SL AMB POCT HEMOGLOBIN AIC: 10.7 (ref ?–6.5)

## 2020-05-19 PROCEDURE — 83036 HEMOGLOBIN GLYCOSYLATED A1C: CPT | Performed by: INTERNAL MEDICINE

## 2020-05-19 PROCEDURE — 3008F BODY MASS INDEX DOCD: CPT | Performed by: INTERNAL MEDICINE

## 2020-05-19 PROCEDURE — 1036F TOBACCO NON-USER: CPT | Performed by: INTERNAL MEDICINE

## 2020-05-19 PROCEDURE — 3046F HEMOGLOBIN A1C LEVEL >9.0%: CPT | Performed by: INTERNAL MEDICINE

## 2020-05-19 PROCEDURE — 3075F SYST BP GE 130 - 139MM HG: CPT | Performed by: INTERNAL MEDICINE

## 2020-05-19 PROCEDURE — 3079F DIAST BP 80-89 MM HG: CPT | Performed by: INTERNAL MEDICINE

## 2020-05-19 PROCEDURE — 99214 OFFICE O/P EST MOD 30 MIN: CPT | Performed by: INTERNAL MEDICINE

## 2020-05-19 RX ORDER — BUSPIRONE HYDROCHLORIDE 7.5 MG/1
7.5 TABLET ORAL 2 TIMES DAILY
Qty: 60 TABLET | Refills: 5 | Status: SHIPPED | OUTPATIENT
Start: 2020-05-19 | End: 2020-11-16 | Stop reason: SDUPTHER

## 2020-05-26 ENCOUNTER — TELEPHONE (OUTPATIENT)
Dept: INTERNAL MEDICINE CLINIC | Facility: CLINIC | Age: 55
End: 2020-05-26

## 2020-07-17 DIAGNOSIS — E11.10 DKA (DIABETIC KETOACIDOSES): ICD-10-CM

## 2020-08-17 DIAGNOSIS — I10 BENIGN ESSENTIAL HYPERTENSION: ICD-10-CM

## 2020-08-17 DIAGNOSIS — E11.9 TYPE 2 DIABETES MELLITUS WITHOUT COMPLICATION, WITHOUT LONG-TERM CURRENT USE OF INSULIN (HCC): ICD-10-CM

## 2020-08-17 PROCEDURE — 4010F ACE/ARB THERAPY RXD/TAKEN: CPT | Performed by: INTERNAL MEDICINE

## 2020-08-17 RX ORDER — LISINOPRIL 20 MG/1
20 TABLET ORAL DAILY
Qty: 30 TABLET | Refills: 5 | Status: SHIPPED | OUTPATIENT
Start: 2020-08-17 | End: 2021-03-02

## 2020-08-25 ENCOUNTER — OFFICE VISIT (OUTPATIENT)
Dept: INTERNAL MEDICINE CLINIC | Facility: CLINIC | Age: 55
End: 2020-08-25
Payer: COMMERCIAL

## 2020-08-25 VITALS
OXYGEN SATURATION: 98 % | TEMPERATURE: 98.4 F | WEIGHT: 214.6 LBS | BODY MASS INDEX: 42.13 KG/M2 | RESPIRATION RATE: 14 BRPM | HEIGHT: 60 IN | DIASTOLIC BLOOD PRESSURE: 80 MMHG | SYSTOLIC BLOOD PRESSURE: 134 MMHG | HEART RATE: 98 BPM

## 2020-08-25 DIAGNOSIS — I10 BENIGN ESSENTIAL HYPERTENSION: ICD-10-CM

## 2020-08-25 DIAGNOSIS — E11.10 DKA (DIABETIC KETOACIDOSES): ICD-10-CM

## 2020-08-25 DIAGNOSIS — I48.0 PAF (PAROXYSMAL ATRIAL FIBRILLATION) (HCC): ICD-10-CM

## 2020-08-25 DIAGNOSIS — E11.9 TYPE 2 DIABETES MELLITUS WITHOUT COMPLICATION, WITHOUT LONG-TERM CURRENT USE OF INSULIN (HCC): Primary | ICD-10-CM

## 2020-08-25 LAB — SL AMB POCT HEMOGLOBIN AIC: 9.2 (ref ?–6.5)

## 2020-08-25 PROCEDURE — 3008F BODY MASS INDEX DOCD: CPT | Performed by: INTERNAL MEDICINE

## 2020-08-25 PROCEDURE — 3079F DIAST BP 80-89 MM HG: CPT | Performed by: INTERNAL MEDICINE

## 2020-08-25 PROCEDURE — 83036 HEMOGLOBIN GLYCOSYLATED A1C: CPT | Performed by: INTERNAL MEDICINE

## 2020-08-25 PROCEDURE — 3075F SYST BP GE 130 - 139MM HG: CPT | Performed by: INTERNAL MEDICINE

## 2020-08-25 PROCEDURE — 1036F TOBACCO NON-USER: CPT | Performed by: INTERNAL MEDICINE

## 2020-08-25 PROCEDURE — 99214 OFFICE O/P EST MOD 30 MIN: CPT | Performed by: INTERNAL MEDICINE

## 2020-08-25 PROCEDURE — 3046F HEMOGLOBIN A1C LEVEL >9.0%: CPT | Performed by: INTERNAL MEDICINE

## 2020-08-25 RX ORDER — INSULIN GLARGINE 100 [IU]/ML
34 INJECTION, SOLUTION SUBCUTANEOUS DAILY
Qty: 10 PEN | Refills: 1 | Status: SHIPPED | COMMUNITY
Start: 2020-08-25 | End: 2020-10-09

## 2020-08-25 NOTE — PROGRESS NOTES
Assessment/Plan:    Problem List Items Addressed This Visit        Endocrine    Type 2 diabetes mellitus without complication, without long-term current use of insulin ([facility] [address] ) - Primary    Relevant Orders    POCT hemoglobin A1c (Completed)           Diagnoses and all orders for this visit:    Type 2 diabetes mellitus without complication, without long-term current use of insulin ([facility])  -     POCT hemoglobin A1c    Other orders  -     Cancel: Ambulatory referral for colonoscopy        No problem-specific Assessment & Plan notes found for this encounter  Subjective:      Patient ID: Shila Montenegro is a 63 y o  female  The patient continues to tolerate the Welchol and Lopid  Diabetes   She presents for her follow-up diabetic visit  She has type 2 diabetes mellitus  There are no hypoglycemic associated symptoms  Pertinent negatives for hypoglycemia include no headaches or sweats  There are no diabetic associated symptoms  Pertinent negatives for diabetes include no blurred vision, no chest pain and no fatigue  There are no hypoglycemic complications  There are no diabetic complications  Pertinent negatives for diabetic complications include no CVA, PVD or retinopathy  There are no known risk factors for coronary artery disease  She is compliant with treatment all of the time  Her weight is stable  She has not had a previous visit with a dietitian  She participates in exercise daily  There is no change in her home blood glucose trend  An ACE inhibitor/angiotensin II receptor blocker is being taken  Hypertension   This is a chronic problem  The current episode started more than 1 year ago  The problem is controlled  Pertinent negatives include no anxiety, blurred vision, chest pain, headaches, malaise/fatigue, neck pain, orthopnea, palpitations, peripheral edema, PND, shortness of breath or sweats  There are no associated agents to hypertension  There are no known risk factors for coronary artery disease  Past treatments include ACE inhibitors  The current treatment provides significant improvement  There are no compliance problems  There is no history of angina, kidney disease, CAD/MI, CVA, heart failure, left ventricular hypertrophy, PVD or retinopathy  The following portions of the patient's history were reviewed and updated as appropriate:   She has a past medical history of Acquired bowing of left tibia, Bilateral hydronephrosis (1/24/2018), CVA (cerebral vascular accident) [facility]), Left thyroid nodule (1/24/2018), and Non Hodgkin's lymphoma (11/02/2016)  ,  does not have any pertinent problems on file  ,   has a past surgical history that includes No past surgeries; Tonsillectomy; IR port placement; and IR port removal ,  family history includes Dementia in her father and mother; Parkinsonism in her mother  ,   reports that she has never smoked  She has never used smokeless tobacco  She reports that she does not drink alcohol or use drugs  ,  is allergic to clam shell; peanut oil; statins; and penicillins     Current Outpatient Medications   Medication Sig Dispense Refill    B Complex-Biotin-FA (B COMPLETE PO) Take by mouth      Blood Glucose Monitoring Suppl (FREESTYLE LITE) ASHLEY by Does not apply route 2 (two) times a day FreeStyle Lite Device (Glucometer) 1 each 0    busPIRone (BUSPAR) 7 5 mg tablet Take 1 tablet (7 5 mg total) by mouth 2 (two) times a day 60 tablet 5    Cholecalciferol (VITAMIN D3 PO) Take by mouth      colesevelam (WELCHOL) 625 mg tablet TAKE 3 TABLETS TWICE A DAY   180 tablet 5    CRANBERRY EXTRACT PO Take by mouth      Flavoring Agent (BLUEBERRY FLAVOR) LIQD by Does not apply route      FREESTYLE TEST STRIPS test strip Twice a day 200 each 5    gemfibrozil (LOPID) 600 mg tablet Take 1 tablet (600 mg total) by mouth 2 (two) times a day 60 tablet 5    Cam, Zingiber officinalis, (CAM PO) Take by mouth daily      glucose monitoring kit (FREESTYLE) monitoring kit 1 each by Does not apply route 2 (two) times a day 1 each 0    insulin glargine (Lantus SoloStar) 100 units/mL injection pen Inject 24 Units under the skin daily (Patient taking differently: Inject 30 Units under the skin daily ) 10 pen 1    Insulin Pen Needle (COMFORT EZ PEN NEEDLES) 31G X 6 MM MISC by Does not apply route daily 100 each 3    Lancets (FREESTYLE) lancets Twice a day 200 each 5    lisinopril (ZESTRIL) 20 mg tablet TAKE 1 TABLET (20 MG TOTAL) BY MOUTH DAILY 30 tablet 5    metFORMIN (GLUCOPHAGE) 1000 MG tablet TAKE ONE-HALF (1/2) TABLET IN THE MORNING AND TAKE ONE TABLET BY MOUTH IN THE EVENING 60 tablet 5    PSYLLIUM PO Take by mouth      Turmeric 1053 MG TABS TAKE 2 TABLETS DAILY       No current facility-administered medications for this visit  Review of Systems   Constitutional: Negative for chills, fatigue, fever and malaise/fatigue  HENT: Negative  Eyes: Negative for blurred vision  Respiratory: Negative for cough, chest tightness and shortness of breath  Cardiovascular: Negative for chest pain, palpitations, orthopnea, leg swelling and PND  Gastrointestinal: Negative for abdominal pain, constipation, diarrhea, nausea and vomiting  Genitourinary: Negative  Musculoskeletal: Negative for arthralgias, back pain, myalgias and neck pain  Skin: Negative  Neurological: Negative  Negative for headaches  Psychiatric/Behavioral: Negative  Objective:  Vitals:    08/25/20 1046   BP: 134/80   BP Location: Right arm   Patient Position: Sitting   Cuff Size: Large   Pulse: 98   Resp: 14   Temp: 98 4 °F (36 9 °C)   SpO2: 98%   Weight: 97 3 kg (214 lb 9 6 oz)   Height: 5' (1 524 m)     Body mass index is 41 91 kg/m²  Physical Exam  Vitals signs and nursing note reviewed  Constitutional:       Appearance: She is well-developed  HENT:      Head: Normocephalic and atraumatic  Eyes:      Pupils: Pupils are equal, round, and reactive to light     Neck: Musculoskeletal: Normal range of motion and neck supple  Cardiovascular:      Rate and Rhythm: Normal rate and regular rhythm  Heart sounds: Normal heart sounds  No murmur  Pulmonary:      Effort: Pulmonary effort is normal  No respiratory distress  Breath sounds: Normal breath sounds  No wheezing  Abdominal:      General: Bowel sounds are normal       Palpations: Abdomen is soft  Tenderness: There is no abdominal tenderness  Musculoskeletal: Normal range of motion  Skin:     General: Skin is warm and dry  Neurological:      Mental Status: She is alert and oriented to person, place, and time            PHQ-9 Depression Screening    PHQ-9:    Frequency of the following problems over the past two weeks:

## 2020-10-09 DIAGNOSIS — E11.10 DKA (DIABETIC KETOACIDOSES): ICD-10-CM

## 2020-10-09 DIAGNOSIS — E11.9 TYPE 2 DIABETES MELLITUS WITHOUT COMPLICATION, WITHOUT LONG-TERM CURRENT USE OF INSULIN (HCC): ICD-10-CM

## 2020-10-09 RX ORDER — GEMFIBROZIL 600 MG/1
600 TABLET, FILM COATED ORAL 2 TIMES DAILY
Qty: 60 TABLET | Refills: 5 | Status: SHIPPED | OUTPATIENT
Start: 2020-10-09

## 2020-10-09 RX ORDER — INSULIN GLARGINE 100 [IU]/ML
INJECTION, SOLUTION SUBCUTANEOUS
Qty: 15 ML | Refills: 1 | Status: SHIPPED | OUTPATIENT
Start: 2020-10-09 | End: 2021-03-02

## 2020-11-14 DIAGNOSIS — E78.2 MIXED HYPERLIPIDEMIA: ICD-10-CM

## 2020-11-16 DIAGNOSIS — E78.2 MIXED HYPERLIPIDEMIA: ICD-10-CM

## 2020-11-16 DIAGNOSIS — F41.1 GENERALIZED ANXIETY DISORDER: ICD-10-CM

## 2020-11-16 RX ORDER — BUSPIRONE HYDROCHLORIDE 7.5 MG/1
7.5 TABLET ORAL 2 TIMES DAILY
Qty: 180 TABLET | Refills: 1 | Status: SHIPPED | OUTPATIENT
Start: 2020-11-16 | End: 2021-03-02

## 2020-11-16 RX ORDER — COLESEVELAM 180 1/1
TABLET ORAL
Qty: 180 TABLET | Refills: 1 | Status: SHIPPED | OUTPATIENT
Start: 2020-11-16 | End: 2020-11-16 | Stop reason: SDUPTHER

## 2020-11-16 RX ORDER — COLESEVELAM 180 1/1
1875 TABLET ORAL 2 TIMES DAILY
Qty: 540 TABLET | Refills: 1 | Status: SHIPPED | OUTPATIENT
Start: 2020-11-16 | End: 2021-03-04

## 2020-12-02 ENCOUNTER — OFFICE VISIT (OUTPATIENT)
Dept: INTERNAL MEDICINE CLINIC | Facility: CLINIC | Age: 55
End: 2020-12-02
Payer: COMMERCIAL

## 2020-12-02 VITALS
TEMPERATURE: 97.6 F | SYSTOLIC BLOOD PRESSURE: 124 MMHG | DIASTOLIC BLOOD PRESSURE: 78 MMHG | HEIGHT: 60 IN | HEART RATE: 106 BPM | WEIGHT: 212 LBS | OXYGEN SATURATION: 96 % | BODY MASS INDEX: 41.62 KG/M2 | RESPIRATION RATE: 18 BRPM

## 2020-12-02 DIAGNOSIS — K63.5 POLYP OF COLON, UNSPECIFIED PART OF COLON, UNSPECIFIED TYPE: ICD-10-CM

## 2020-12-02 DIAGNOSIS — E11.9 TYPE 2 DIABETES MELLITUS WITHOUT COMPLICATION, WITHOUT LONG-TERM CURRENT USE OF INSULIN (HCC): Primary | ICD-10-CM

## 2020-12-02 LAB — SL AMB POCT HEMOGLOBIN AIC: 9.6 (ref ?–6.5)

## 2020-12-02 PROCEDURE — 83036 HEMOGLOBIN GLYCOSYLATED A1C: CPT | Performed by: INTERNAL MEDICINE

## 2020-12-02 PROCEDURE — 99214 OFFICE O/P EST MOD 30 MIN: CPT | Performed by: INTERNAL MEDICINE

## 2020-12-14 DIAGNOSIS — E11.9 TYPE 2 DIABETES MELLITUS WITHOUT COMPLICATION, WITHOUT LONG-TERM CURRENT USE OF INSULIN (HCC): Primary | ICD-10-CM

## 2020-12-16 ENCOUNTER — TELEPHONE (OUTPATIENT)
Dept: GASTROENTEROLOGY | Facility: CLINIC | Age: 55
End: 2020-12-16

## 2021-01-06 ENCOUNTER — OFFICE VISIT (OUTPATIENT)
Dept: ENDOCRINOLOGY | Facility: CLINIC | Age: 56
End: 2021-01-06
Payer: COMMERCIAL

## 2021-01-06 VITALS
DIASTOLIC BLOOD PRESSURE: 72 MMHG | OXYGEN SATURATION: 98 % | HEART RATE: 104 BPM | WEIGHT: 210.6 LBS | HEIGHT: 60 IN | SYSTOLIC BLOOD PRESSURE: 138 MMHG | BODY MASS INDEX: 41.35 KG/M2

## 2021-01-06 DIAGNOSIS — E11.9 TYPE 2 DIABETES MELLITUS WITHOUT COMPLICATION, WITHOUT LONG-TERM CURRENT USE OF INSULIN (HCC): Primary | ICD-10-CM

## 2021-01-06 PROCEDURE — 99243 OFF/OP CNSLTJ NEW/EST LOW 30: CPT | Performed by: NURSE PRACTITIONER

## 2021-01-06 NOTE — ASSESSMENT & PLAN NOTE
Lengthy discussion regarding patient's anxiety and how it is affecting her diabetes  Therapeutic listening provided  Strongly recommend that she consider starting an SSRI in addition to her views by our  Strongly recommend that she obtain a therapist   Patient is welcome back to this office when she is interested in discussing appropriate diabetes management that will likely include additional medication and diabetes education    Lab Results   Component Value Date    HGBA1C 9 6 (A) 12/02/2020

## 2021-01-06 NOTE — PROGRESS NOTES
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)  The patient is very anxious about this diagnosis and 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  She is also currently adamant that she does not wish to take any other medications for diabetes at this time  Her reasons are myriad and connected to her increased anxiety for which she states she recently resumed buspirone  Current regimen:   Lantus 24 units daily  Metformin 1000 mg BID with meals    Type: T2DM    Medications:  Tried ozempic "It didn't do anything "  Tried fast-acting insulin in hospital only, "I will not take more insulin "  Tried Invokana, "That made my sugar go up "     Blood Sugars  Fastin-200  Takes Lantus 9:30-10am  Occasionally tests 30 minutes after eating then in the 200s  States that her sugars plummet" sometimes mid day  When asked what her sugars are when she is symptomatic of hypoglycemia with shaking, sweating, or lightheadedness, she states "100-120 "    HgA1C is elevated at 9 3% on 2020  She denies polydipsia, polyuria, polyphagia  She denies any changes to her vision  Meter- unsure of type    Diet:  Patient states that she has been working very hard to follow a healthy, low carbohydrate diet  She states that she has lost a significant amount of weight in the last year    BF: Deconstructed BLT, miracle whip, coffee with light cream  Go to work, keeps candy on the bus in case of feeling hypoglycemia  L:  D:    Exercise:  bicycling    Influenza vaccine: "Absolutely not "    Pneumovax:  Refuses    Ophthalmology:  Refuses    Podiatry/Foot care:  Refuses    Dentist:  Refuses    Family history of diabetes: None  Diabetes education/nutrition:  Refuses    For her hypertension, she is taking 20 mg of lisinopril daily  She denies headache and cough  For her hyperlipidemia, she is taking gemfibrozil 600 mg twice daily  She denies nausea      Patient Active Problem List   Diagnosis    Benign essential hypertension    Hyperlipemia    Left thyroid nodule    Non Hodgkin's lymphoma ([facility] [facility] )    Type 2 diabetes mellitus without complication, without long-term current use of insulin ([facility])    Polyarthralgia    PAF (paroxysmal atrial fibrillation) ([facility]    Allergic reaction    Rash    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    Allergy to walnuts    Allergic conjunctivitis of both eyes    Allergic rhinitis    Chronic seasonal allergic rhinitis due to pollen    Deformity of tibia, left      Past Medical History:   Diagnosis Date    Acquired bowing of left tibia     Bilateral hydronephrosis 1/24/2018    CVA (cerebral vascular accident) ([facility] [place] [facility] )     Left thyroid nodule 1/24/2018    Non Hodgkin's lymphoma 11/02/2016      Past Surgical History:   Procedure Laterality Date    IR PORT PLACEMENT      IR PORT REMOVAL      NO PAST SURGERIES      TONSILLECTOMY        Family History   Problem Relation Age of Onset   Iqbal Dementia Mother     Parkinsonism Mother     Dementia Father      Social History     Tobacco Use    Smoking status: Never Smoker    Smokeless tobacco: Never Used   Substance Use Topics    Alcohol use: Never     Frequency: Never     Drinks per session: Patient refused     Binge frequency: Never     Allergies   Allergen Reactions    Clam Shell      Clam family    Peanut Oil      WALNUTS    Statins Myalgia    Penicillins Rash         Current Outpatient Medications:     B Complex-Biotin-FA (B COMPLETE PO), Take by mouth, Disp: , Rfl:     Blood Glucose Monitoring Suppl (FREESTYLE LITE) ASHLEY, by Does not apply route 2 (two) times a day FreeStyle Lite Device (Glucometer), Disp: 1 each, Rfl: 0    busPIRone (BUSPAR) 7 5 mg tablet, Take 1 tablet (7 5 mg total) by mouth 2 (two) times a day, Disp: 180 tablet, Rfl: 1    Cholecalciferol (VITAMIN D3 PO), Take by mouth, Disp: , Rfl:     colesevelam (WELCHOL) 625 mg tablet, Take 3 tablets (1,875 mg total) by mouth 2 (two) times a day, Disp: 540 tablet, Rfl: 1    CRANBERRY EXTRACT PO, Take by mouth, Disp: , Rfl:     Flavoring Agent (BLUEBERRY FLAVOR) LIQD, by Does not apply route, Disp: , Rfl:     FREESTYLE TEST STRIPS test strip, Twice a day, Disp: 200 each, Rfl: 5    gemfibrozil (LOPID) 600 mg tablet, TAKE 1 TABLET (600 MG TOTAL) BY MOUTH 2 (TWO) TIMES A DAY, Disp: 60 tablet, Rfl: 5    Cam, Zingiber officinalis, (CAM PO), Take by mouth daily, Disp: , Rfl:     glucose monitoring kit (FREESTYLE) monitoring kit, 1 each by Does not apply route 2 (two) times a day, Disp: 1 each, Rfl: 0    Insulin Pen Needle (COMFORT EZ PEN NEEDLES) 31G X 6 MM MISC, by Does not apply route daily, Disp: 100 each, Rfl: 3    Lancets (FREESTYLE) lancets, Twice a day, Disp: 200 each, Rfl: 5    Lantus SoloStar 100 units/mL injection pen, INJECT 24 UNITS UNDER THE SKIN DAILY, Disp: 15 mL, Rfl: 1    lisinopril (ZESTRIL) 20 mg tablet, TAKE 1 TABLET (20 MG TOTAL) BY MOUTH DAILY, Disp: 30 tablet, Rfl: 5    metFORMIN (GLUCOPHAGE) 1000 MG tablet, One full tablet in the am and one full in the evening with meals, Disp: 60 tablet, Rfl: 5    PSYLLIUM PO, Take by mouth, Disp: , Rfl:     Turmeric 1053 MG TABS, TAKE 2 TABLETS DAILY, Disp: , Rfl:     Review of Systems   Constitutional: Negative for activity change, appetite change, fatigue and unexpected weight change  Eyes: Negative for visual disturbance     Cardiovascular: Negative for chest pain, palpitations and leg swelling  Gastrointestinal: Negative for constipation, diarrhea, nausea and vomiting  Endocrine: Negative for polydipsia, polyphagia and polyuria  Genitourinary: Negative for frequency  Skin: Negative for wound  Allergic/Immunologic: Positive for environmental allergies and food allergies  Neurological: Negative for dizziness, weakness, light-headedness, numbness and headaches  Physical Exam:  Body mass index is 41 13 kg/m²  /72 (BP Location: Right arm, Cuff Size: Adult)   Pulse 104   Ht 5' (1 524 m)   Wt 95 5 kg (210 lb 9 6 oz)   SpO2 98%   BMI 41 13 kg/m²    Wt Readings from Last 3 Encounters:   01/06/21 95 5 kg (210 lb 9 6 oz)   12/02/20 96 2 kg (212 lb)   08/25/20 97 3 kg (214 lb 9 6 oz)       Physical Exam  Constitutional:       Appearance: She is well-developed  HENT:      Head: Normocephalic and atraumatic  Comments: Mask in place  Eyes:      Pupils: Pupils are equal, round, and reactive to light  Neck:      Musculoskeletal: Normal range of motion and neck supple  Thyroid: No thyromegaly  Cardiovascular:      Rate and Rhythm: Regular rhythm  Tachycardia present  Pulses: Normal pulses  Heart sounds: Normal heart sounds  Pulmonary:      Effort: Pulmonary effort is normal       Breath sounds: Normal breath sounds  Abdominal:      General: Bowel sounds are normal       Palpations: Abdomen is soft  Lymphadenopathy:      Cervical: No cervical adenopathy  Skin:     General: Skin is warm and dry  Neurological:      Mental Status: She is alert and oriented to person, place, and time           Labs:   Lab Results   Component Value Date    HGBA1C 9 6 (A) 12/02/2020    HGBA1C 9 2 (A) 08/25/2020    HGBA1C 10 7 (A) 05/19/2020     Lab Results   Component Value Date    CREATININE 0 76 01/02/2020    CREATININE 0 76 08/14/2019    CREATININE 0 67 08/10/2019    BUN 24 01/02/2020    K 4 6 01/02/2020     01/02/2020    CO2 22 01/02/2020     eGFR   Date Value Ref Range Status   01/02/2020 89 ml/min/1 73sq m Final     Lab Results   Component Value Date    HDL 47 08/05/2019    TRIG 222 (H) 08/05/2019     Lab Results   Component Value Date    ALT 25 01/02/2020    AST 26 01/02/2020    ALKPHOS 119 01/02/2020     Lab Results   Component Value Date    NKB5EILEULCZ 1 200 08/14/2019    LMI3KDPHNDTY 1 180 12/26/2018     No results found for: FREET4, TSI    Impression & Plan:    Problem List Items Addressed This Visit        Endocrine    Type 2 diabetes mellitus without complication, without long-term current use of insulin ([address] ) - Primary     Lengthy discussion regarding patient's anxiety and how it is affecting her diabetes  Therapeutic listening provided  Strongly recommend that she consider starting an SSRI in addition to her views by our  Strongly recommend that she obtain a therapist   Patient is welcome back to this office when she is interested in discussing appropriate diabetes management that will likely include additional medication and diabetes education  Lab Results   Component Value Date    HGBA1C 9 6 (A) 12/02/2020            Relevant Orders    CBC and differential Lab Collect    Comprehensive metabolic panel Lab Collect    Lipid panel Lab Collect Lab Collect    Microalbumin / creatinine urine ratio Lab Collect    C-peptide Lab Collect          Orders Placed This Encounter   Procedures    CBC and differential Lab Collect     This is a patient instruction: This test is non-fasting  Please drink two glasses of water morning of bloodwork  Standing Status:   Future     Standing Expiration Date:   1/6/2022    Comprehensive metabolic panel Lab Collect     This is a patient instruction: Patient fasting for 8 hours or longer recommended  Standing Status:   Future     Standing Expiration Date:   1/6/2022    Lipid panel Lab Collect Lab Collect     This is a patient instruction:  This test requires patient fasting for 10-12 hours or longer  Drinking of black coffee or black tea is acceptable  Standing Status:   Future     Standing Expiration Date:   1/6/2022    Microalbumin / creatinine urine ratio Lab Collect     Standing Status:   Future     Standing Expiration Date:   1/6/2022    C-peptide Lab Collect     This is a patient instruction: This test requires patient fasting for 14-16 hours  Standing Status:   Future     Standing Expiration Date:   1/6/2022       Discussed with the patient and all questioned fully answered  She will call me if any problems arise  Follow-up appointment as needed should symptoms change or fail to improve       Counseled patient on diagnostic results, prognosis, risk and benefit of treatment options, instruction for management, importance of treatment compliance, Risk  factor reduction and impressions    LIDIA Perales

## 2021-01-21 ENCOUNTER — LAB (OUTPATIENT)
Dept: LAB | Facility: HOSPITAL | Age: 56
End: 2021-01-21
Payer: COMMERCIAL

## 2021-01-21 ENCOUNTER — TRANSCRIBE ORDERS (OUTPATIENT)
Dept: LAB | Facility: HOSPITAL | Age: 56
End: 2021-01-21

## 2021-01-21 ENCOUNTER — VBI (OUTPATIENT)
Dept: ADMINISTRATIVE | Facility: OTHER | Age: 56
End: 2021-01-21

## 2021-01-21 DIAGNOSIS — C83.30 DIFFUSE LARGE B-CELL LYMPHOMA, UNSPECIFIED BODY REGION (HCC): ICD-10-CM

## 2021-01-21 DIAGNOSIS — C83.30 DIFFUSE LARGE B-CELL LYMPHOMA, UNSPECIFIED BODY REGION (HCC): Primary | ICD-10-CM

## 2021-01-21 DIAGNOSIS — Z79.4 TYPE 2 DIABETES MELLITUS WITHOUT COMPLICATION, WITH LONG-TERM CURRENT USE OF INSULIN (HCC): Primary | ICD-10-CM

## 2021-01-21 DIAGNOSIS — E11.9 TYPE 2 DIABETES MELLITUS WITHOUT COMPLICATION, WITHOUT LONG-TERM CURRENT USE OF INSULIN (HCC): ICD-10-CM

## 2021-01-21 DIAGNOSIS — E11.9 TYPE 2 DIABETES MELLITUS WITHOUT COMPLICATION, WITH LONG-TERM CURRENT USE OF INSULIN (HCC): Primary | ICD-10-CM

## 2021-01-21 LAB
ALBUMIN SERPL BCP-MCNC: 4.4 G/DL (ref 3.5–5.7)
ALP SERPL-CCNC: 134 U/L (ref 40–150)
ALT SERPL W P-5'-P-CCNC: 24 U/L (ref 7–52)
ANION GAP SERPL CALCULATED.3IONS-SCNC: 8 MMOL/L (ref 4–13)
AST SERPL W P-5'-P-CCNC: 25 U/L (ref 13–39)
BASOPHILS # BLD AUTO: 0.1 THOUSANDS/ΜL (ref 0–0.1)
BASOPHILS NFR BLD AUTO: 1 % (ref 0–2)
BILIRUB SERPL-MCNC: 0.4 MG/DL (ref 0.2–1)
BUN SERPL-MCNC: 19 MG/DL (ref 7–25)
CALCIUM SERPL-MCNC: 9.8 MG/DL (ref 8.6–10.5)
CHLORIDE SERPL-SCNC: 102 MMOL/L (ref 98–107)
CHOLEST SERPL-MCNC: 173 MG/DL (ref 0–200)
CO2 SERPL-SCNC: 24 MMOL/L (ref 21–31)
CREAT SERPL-MCNC: 0.76 MG/DL (ref 0.6–1.2)
CREAT UR-MCNC: 98.2 MG/DL
EOSINOPHIL # BLD AUTO: 0.4 THOUSAND/ΜL (ref 0–0.61)
EOSINOPHIL NFR BLD AUTO: 6 % (ref 0–5)
ERYTHROCYTE [DISTWIDTH] IN BLOOD BY AUTOMATED COUNT: 13.7 % (ref 11.5–14.5)
GFR SERPL CREATININE-BSD FRML MDRD: 87 ML/MIN/1.73SQ M
GLUCOSE P FAST SERPL-MCNC: 190 MG/DL (ref 65–99)
HCT VFR BLD AUTO: 38.3 % (ref 42–47)
HDLC SERPL-MCNC: 40 MG/DL
HGB BLD-MCNC: 13 G/DL (ref 12–16)
LDLC SERPL CALC-MCNC: 106 MG/DL (ref 0–100)
LYMPHOCYTES # BLD AUTO: 2 THOUSANDS/ΜL (ref 0.6–4.47)
LYMPHOCYTES NFR BLD AUTO: 27 % (ref 21–51)
MCH RBC QN AUTO: 30.9 PG (ref 26–34)
MCHC RBC AUTO-ENTMCNC: 33.9 G/DL (ref 31–37)
MCV RBC AUTO: 91 FL (ref 81–99)
MICROALBUMIN UR-MCNC: 153 MG/L (ref 0–20)
MICROALBUMIN/CREAT 24H UR: 156 MG/G CREATININE (ref 0–30)
MONOCYTES # BLD AUTO: 0.4 THOUSAND/ΜL (ref 0.17–1.22)
MONOCYTES NFR BLD AUTO: 5 % (ref 2–12)
NEUTROPHILS # BLD AUTO: 4.4 THOUSANDS/ΜL (ref 1.4–6.5)
NEUTS SEG NFR BLD AUTO: 61 % (ref 42–75)
NONHDLC SERPL-MCNC: 133 MG/DL
PLATELET # BLD AUTO: 424 THOUSANDS/UL (ref 149–390)
PMV BLD AUTO: 8.5 FL (ref 8.6–11.7)
POTASSIUM SERPL-SCNC: 4.3 MMOL/L (ref 3.5–5.5)
PROT SERPL-MCNC: 7.5 G/DL (ref 6.4–8.9)
RBC # BLD AUTO: 4.2 MILLION/UL (ref 3.9–5.2)
SODIUM SERPL-SCNC: 134 MMOL/L (ref 134–143)
TRIGL SERPL-MCNC: 134 MG/DL (ref 44–166)
WBC # BLD AUTO: 7.3 THOUSAND/UL (ref 4.8–10.8)

## 2021-01-21 PROCEDURE — 85025 COMPLETE CBC W/AUTO DIFF WBC: CPT

## 2021-01-21 PROCEDURE — 36415 COLL VENOUS BLD VENIPUNCTURE: CPT

## 2021-01-21 PROCEDURE — 82043 UR ALBUMIN QUANTITATIVE: CPT

## 2021-01-21 PROCEDURE — 80061 LIPID PANEL: CPT

## 2021-01-21 PROCEDURE — 82570 ASSAY OF URINE CREATININE: CPT

## 2021-01-21 PROCEDURE — 80053 COMPREHEN METABOLIC PANEL: CPT

## 2021-01-21 PROCEDURE — 84681 ASSAY OF C-PEPTIDE: CPT

## 2021-01-22 LAB — C PEPTIDE SERPL-MCNC: 3.1 NG/ML (ref 1.1–4.4)

## 2021-01-31 NOTE — RESULT ENCOUNTER NOTE
Results reviewed  Patient continues to make her own insulin  She does have some protein in her urine  The rest of her labs are unremarkable  She has decided to continue to follow with her PCP  Sent to [address]

## 2021-02-10 ENCOUNTER — TELEPHONE (OUTPATIENT)
Dept: INTERNAL MEDICINE CLINIC | Facility: CLINIC | Age: 56
End: 2021-02-10

## 2021-02-10 NOTE — TELEPHONE ENCOUNTER
Called pt to verify prescription coverage, LM to call office  Tried to do Prior Auth on Colesevelam  mg but stated coverage failed

## 2021-02-11 ENCOUNTER — TELEPHONE (OUTPATIENT)
Dept: INTERNAL MEDICINE CLINIC | Facility: CLINIC | Age: 56
End: 2021-02-11

## 2021-02-20 DIAGNOSIS — E11.9 TYPE 2 DIABETES MELLITUS WITHOUT COMPLICATION, WITHOUT LONG-TERM CURRENT USE OF INSULIN (HCC): ICD-10-CM

## 2021-02-20 RX ORDER — PEN NEEDLE, DIABETIC 31 GX5/16"
NEEDLE, DISPOSABLE MISCELLANEOUS
Qty: 100 EACH | Refills: 3 | Status: SHIPPED | OUTPATIENT
Start: 2021-02-20 | End: 2021-02-20 | Stop reason: SDUPTHER

## 2021-02-22 RX ORDER — PEN NEEDLE, DIABETIC 31 GX5/16"
NEEDLE, DISPOSABLE MISCELLANEOUS DAILY
Qty: 100 EACH | Refills: 1 | Status: SHIPPED | OUTPATIENT
Start: 2021-02-22 | End: 2021-08-21

## 2021-03-02 DIAGNOSIS — E11.10 DKA (DIABETIC KETOACIDOSES): ICD-10-CM

## 2021-03-02 DIAGNOSIS — I10 BENIGN ESSENTIAL HYPERTENSION: ICD-10-CM

## 2021-03-02 DIAGNOSIS — E11.9 TYPE 2 DIABETES MELLITUS WITHOUT COMPLICATION, WITHOUT LONG-TERM CURRENT USE OF INSULIN (HCC): ICD-10-CM

## 2021-03-02 DIAGNOSIS — F41.1 GENERALIZED ANXIETY DISORDER: ICD-10-CM

## 2021-03-02 RX ORDER — BUSPIRONE HYDROCHLORIDE 7.5 MG/1
7.5 TABLET ORAL 2 TIMES DAILY
Qty: 180 TABLET | Refills: 1 | Status: SHIPPED | OUTPATIENT
Start: 2021-03-02 | End: 2021-07-23 | Stop reason: SDUPTHER

## 2021-03-02 RX ORDER — LISINOPRIL 20 MG/1
20 TABLET ORAL DAILY
Qty: 90 TABLET | Refills: 1 | Status: SHIPPED | OUTPATIENT
Start: 2021-03-02

## 2021-03-02 RX ORDER — BLOOD SUGAR DIAGNOSTIC
STRIP MISCELLANEOUS
Qty: 200 EACH | Refills: 5 | Status: SHIPPED | OUTPATIENT
Start: 2021-03-02

## 2021-03-02 RX ORDER — INSULIN GLARGINE 100 [IU]/ML
INJECTION, SOLUTION SUBCUTANEOUS
Qty: 15 ML | Refills: 1 | Status: SHIPPED | OUTPATIENT
Start: 2021-03-02 | End: 2021-03-10 | Stop reason: SDUPTHER

## 2021-03-04 DIAGNOSIS — E78.2 MIXED HYPERLIPIDEMIA: ICD-10-CM

## 2021-03-04 RX ORDER — COLESEVELAM 180 1/1
1875 TABLET ORAL 2 TIMES DAILY
Qty: 540 TABLET | Refills: 1 | Status: SHIPPED | OUTPATIENT
Start: 2021-03-04 | End: 2021-07-22

## 2021-03-10 ENCOUNTER — OFFICE VISIT (OUTPATIENT)
Dept: INTERNAL MEDICINE CLINIC | Facility: CLINIC | Age: 56
End: 2021-03-10
Payer: COMMERCIAL

## 2021-03-10 VITALS
RESPIRATION RATE: 14 BRPM | HEIGHT: 60 IN | OXYGEN SATURATION: 98 % | WEIGHT: 212.6 LBS | DIASTOLIC BLOOD PRESSURE: 84 MMHG | HEART RATE: 84 BPM | BODY MASS INDEX: 41.74 KG/M2 | SYSTOLIC BLOOD PRESSURE: 136 MMHG | TEMPERATURE: 98.9 F

## 2021-03-10 DIAGNOSIS — E11.10 DKA (DIABETIC KETOACIDOSES): ICD-10-CM

## 2021-03-10 DIAGNOSIS — E11.9 TYPE 2 DIABETES MELLITUS WITHOUT COMPLICATION, WITHOUT LONG-TERM CURRENT USE OF INSULIN (HCC): ICD-10-CM

## 2021-03-10 DIAGNOSIS — Z00.00 HEALTH MAINTENANCE EXAMINATION: Primary | ICD-10-CM

## 2021-03-10 LAB — SL AMB POCT HEMOGLOBIN AIC: 8.8 (ref ?–6.5)

## 2021-03-10 PROCEDURE — 83036 HEMOGLOBIN GLYCOSYLATED A1C: CPT | Performed by: INTERNAL MEDICINE

## 2021-03-10 PROCEDURE — 99396 PREV VISIT EST AGE 40-64: CPT | Performed by: INTERNAL MEDICINE

## 2021-03-10 RX ORDER — INSULIN GLARGINE 100 [IU]/ML
26 INJECTION, SOLUTION SUBCUTANEOUS DAILY
Qty: 9 ML | Refills: 5 | Status: SHIPPED | COMMUNITY
Start: 2021-03-10 | End: 2021-03-23 | Stop reason: SDUPTHER

## 2021-03-10 NOTE — PROGRESS NOTES
Assessment/Plan:    Problem List Items Addressed This Visit        Endocrine    Type 2 diabetes mellitus without complication, without long-term current use of insulin ([facility] [address] ) - Primary    Relevant Orders    POCT hemoglobin A1c (Completed)           Diagnoses and all orders for this visit:    Type 2 diabetes mellitus without complication, without long-term current use of insulin ([facility])  -     POCT hemoglobin A1c        No problem-specific Assessment & Plan notes found for this encounter  Subjective:      Patient ID: Zuhair Meyers is a 61 y o  female  The patient is here for a [facility] PE  Notes no concerns at this time      The following portions of the patient's history were reviewed and updated as appropriate:   She has a past medical history of Acquired bowing of left tibia, Bilateral hydronephrosis (1/24/2018), CVA (cerebral vascular accident) ([facility] [address] ), Diabetes mellitus ([facility] ), Left thyroid nodule (1/24/2018), and Non Hodgkin's lymphoma (11/02/2016)  ,  does not have any pertinent problems on file  ,   has a past surgical history that includes No past surgeries; Tonsillectomy; IR port placement; and IR port removal ,  family history includes Dementia in her father and mother; Parkinsonism in her mother  ,   reports that she has never smoked  She has never used smokeless tobacco  She reports that she does not drink alcohol or use drugs  ,  is allergic to clam shell; peanut oil; statins; and penicillins     Current Outpatient Medications   Medication Sig Dispense Refill    B Complex-Biotin-FA (B COMPLETE PO) Take by mouth      Blood Glucose Monitoring Suppl (FREESTYLE LITE) ASHLEY by Does not apply route 2 (two) times a day FreeStyle Lite Device (Glucometer) 1 each 0    busPIRone (BUSPAR) 7 5 mg tablet TAKE 1 TABLET (7 5 MG TOTAL) BY MOUTH 2 (TWO) TIMES A  tablet 1    Cholecalciferol (VITAMIN D3 PO) Take by mouth      colesevelam (WELCHOL) 625 mg tablet TAKE 3 TABLETS (1,875 MG TOTAL) BY MOUTH 2 (TWO) TIMES A  tablet 1    CRANBERRY EXTRACT PO Take by mouth      Flavoring Agent (BLUEBERRY FLAVOR) LIQD by Does not apply route      FREESTYLE TEST STRIPS test strip TEST TWICE A  each 5    gemfibrozil (LOPID) 600 mg tablet TAKE 1 TABLET (600 MG TOTAL) BY MOUTH 2 (TWO) TIMES A DAY 60 tablet 5    Leanna, Zingiber officinalis, (LEANNA PO) Take by mouth daily      glucose monitoring kit (FREESTYLE) monitoring kit 1 each by Does not apply route 2 (two) times a day 1 each 0    Insulin Pen Needle (Comfort EZ Pen Needles) 31G X 6 MM MISC Inject as directed daily 100 each 1    Lancets (FREESTYLE) lancets Twice a day 200 each 5    Lantus SoloStar 100 units/mL injection pen INJECT 24 UNITS UNDER THE SKIN DAILY 15 mL 1    lisinopril (ZESTRIL) 20 mg tablet TAKE 1 TABLET (20 MG TOTAL) BY MOUTH DAILY 90 tablet 1    metFORMIN (GLUCOPHAGE) 1000 MG tablet One full tablet in the am and one full in the evening with meals 60 tablet 5    PSYLLIUM PO Take by mouth      Turmeric 1053 MG TABS TAKE 2 TABLETS DAILY       No current facility-administered medications for this visit  Review of Systems   Constitutional: Negative for chills, fatigue and fever  HENT: Negative  Respiratory: Negative for cough, chest tightness and shortness of breath  Cardiovascular: Negative for chest pain, palpitations and leg swelling  Gastrointestinal: Negative for abdominal pain, constipation, diarrhea, nausea and vomiting  Genitourinary: Negative  Musculoskeletal: Negative for arthralgias, back pain and myalgias  Skin: Negative  Neurological: Negative  Psychiatric/Behavioral: Negative  Objective:  Vitals:    03/10/21 1018   BP: 136/84   BP Location: Right arm   Patient Position: Sitting   Cuff Size: Large   Pulse: 84   Resp: 14   Temp: 98 9 °F (37 2 °C)   SpO2: 98%   Weight: 96 4 kg (212 lb 9 6 oz)   Height: 5' (1 524 m)     Body mass index is 41 52 kg/m²       Physical Exam  Vitals signs and nursing note reviewed  Constitutional:       Appearance: She is well-developed  HENT:      Head: Normocephalic and atraumatic  Eyes:      Pupils: Pupils are equal, round, and reactive to light  Neck:      Musculoskeletal: Normal range of motion and neck supple  Cardiovascular:      Rate and Rhythm: Normal rate and regular rhythm  Heart sounds: Normal heart sounds  No murmur  Pulmonary:      Effort: Pulmonary effort is normal  No respiratory distress  Breath sounds: Normal breath sounds  No wheezing  Abdominal:      General: Bowel sounds are normal       Palpations: Abdomen is soft  Tenderness: There is no abdominal tenderness  Musculoskeletal: Normal range of motion  Skin:     General: Skin is warm and dry  Neurological:      Mental Status: She is alert and oriented to person, place, and time            PHQ-9 Depression Screening    PHQ-9:   Frequency of the following problems over the past two weeks:

## 2021-03-23 ENCOUNTER — OFFICE VISIT (OUTPATIENT)
Dept: INTERNAL MEDICINE CLINIC | Facility: CLINIC | Age: 56
End: 2021-03-23
Payer: COMMERCIAL

## 2021-03-23 VITALS
OXYGEN SATURATION: 98 % | HEART RATE: 102 BPM | RESPIRATION RATE: 16 BRPM | SYSTOLIC BLOOD PRESSURE: 144 MMHG | BODY MASS INDEX: 41.66 KG/M2 | WEIGHT: 212.2 LBS | HEIGHT: 60 IN | DIASTOLIC BLOOD PRESSURE: 82 MMHG | TEMPERATURE: 98.9 F

## 2021-03-23 DIAGNOSIS — E11.10 DKA (DIABETIC KETOACIDOSES): ICD-10-CM

## 2021-03-23 PROCEDURE — 99213 OFFICE O/P EST LOW 20 MIN: CPT | Performed by: INTERNAL MEDICINE

## 2021-03-23 RX ORDER — INSULIN GLARGINE 100 [IU]/ML
30 INJECTION, SOLUTION SUBCUTANEOUS DAILY
Qty: 9 ML | Refills: 5 | Status: SHIPPED | COMMUNITY
Start: 2021-03-23 | End: 2021-09-19

## 2021-03-23 NOTE — PROGRESS NOTES
Assessment/Plan:    Problem List Items Addressed This Visit     None           There are no diagnoses linked to this encounter  No problem-specific Assessment & Plan notes found for this encounter  Subjective:      Patient ID: Ilene Martínez is a 61 y o  female  Uncontrolled DM  The patient would like to stop the Lantus and use Bydureon instead  In noted that we need to continue to titrate the insulin up and see how things go  The following portions of the patient's history were reviewed and updated as appropriate:   She has a past medical history of Acquired bowing of left tibia, Bilateral hydronephrosis (1/24/2018), CVA (cerebral vascular accident) ([facility] ), Diabetes mellitus ([facility] [address] ), Left thyroid nodule (1/24/2018), and Non Hodgkin's lymphoma (11/02/2016)  ,  does not have any pertinent problems on file  ,   has a past surgical history that includes No past surgeries; Tonsillectomy; IR port placement; and IR port removal ,  family history includes Dementia in her father and mother; Parkinsonism in her mother  ,   reports that she has never smoked  She has never used smokeless tobacco  She reports that she does not drink alcohol or use drugs  ,  is allergic to clam shell; peanut oil; statins; and penicillins     Current Outpatient Medications   Medication Sig Dispense Refill    B Complex-Biotin-FA (B COMPLETE PO) Take by mouth      Blood Glucose Monitoring Suppl (FREESTYLE LITE) ASHLEY by Does not apply route 2 (two) times a day FreeStyle Lite Device (Glucometer) 1 each 0    busPIRone (BUSPAR) 7 5 mg tablet TAKE 1 TABLET (7 5 MG TOTAL) BY MOUTH 2 (TWO) TIMES A  tablet 1    Cholecalciferol (VITAMIN D3 PO) Take by mouth      colesevelam (WELCHOL) 625 mg tablet TAKE 3 TABLETS (1,875 MG TOTAL) BY MOUTH 2 (TWO) TIMES A  tablet 1    CRANBERRY EXTRACT PO Take by mouth      Flavoring Agent (BLUEBERRY FLAVOR) LIQD by Does not apply route      FREESTYLE TEST STRIPS test strip TEST TWICE A  each 5    gemfibrozil (LOPID) 600 mg tablet TAKE 1 TABLET (600 MG TOTAL) BY MOUTH 2 (TWO) TIMES A DAY 60 tablet 5    Leanna, Zingiber officinalis, (LEANNA PO) Take by mouth daily      glucose monitoring kit (FREESTYLE) monitoring kit 1 each by Does not apply route 2 (two) times a day 1 each 0    insulin glargine (Lantus SoloStar) 100 units/mL injection pen Inject 26 Units under the skin daily 9 mL 5    Insulin Pen Needle (Comfort EZ Pen Needles) 31G X 6 MM MISC Inject as directed daily 100 each 1    Lancets (FREESTYLE) lancets Twice a day 200 each 5    lisinopril (ZESTRIL) 20 mg tablet TAKE 1 TABLET (20 MG TOTAL) BY MOUTH DAILY 90 tablet 1    metFORMIN (GLUCOPHAGE) 1000 MG tablet One full tablet in the am and one full in the evening with meals 60 tablet 5    PSYLLIUM PO Take by mouth      Turmeric 1053 MG TABS TAKE 2 TABLETS DAILY       No current facility-administered medications for this visit  Review of Systems   Constitutional: Negative for chills, fatigue and fever  HENT: Negative  Respiratory: Negative for cough, chest tightness and shortness of breath  Cardiovascular: Negative for chest pain, palpitations and leg swelling  Gastrointestinal: Negative for abdominal pain, constipation, diarrhea, nausea and vomiting  Genitourinary: Negative  Musculoskeletal: Negative for arthralgias, back pain and myalgias  Skin: Negative  Neurological: Negative  Psychiatric/Behavioral: Negative  Objective:  Vitals:    03/23/21 1027   BP: 144/82   BP Location: Right arm   Patient Position: Sitting   Cuff Size: Large   Pulse: 102   Resp: 16   Temp: 98 9 °F (37 2 °C)   SpO2: 98%   Weight: 96 3 kg (212 lb 3 2 oz)   Height: 5' (1 524 m)     Body mass index is 41 44 kg/m²  Physical Exam  Vitals signs and nursing note reviewed  Constitutional:       Appearance: She is well-developed  HENT:      Head: Normocephalic and atraumatic     Eyes:      Pupils: Pupils are equal, round, and reactive to light  Neck:      Musculoskeletal: Normal range of motion and neck supple  Cardiovascular:      Rate and Rhythm: Normal rate and regular rhythm  Heart sounds: Normal heart sounds  No murmur  Pulmonary:      Effort: Pulmonary effort is normal  No respiratory distress  Breath sounds: Normal breath sounds  No wheezing  Abdominal:      General: Bowel sounds are normal       Palpations: Abdomen is soft  Tenderness: There is no abdominal tenderness  Musculoskeletal: Normal range of motion  Skin:     General: Skin is warm and dry  Neurological:      Mental Status: She is alert and oriented to person, place, and time            PHQ-9 Depression Screening    PHQ-9:   Frequency of the following problems over the past two weeks:

## 2021-03-29 ENCOUNTER — TELEPHONE (OUTPATIENT)
Dept: INTERNAL MEDICINE CLINIC | Facility: CLINIC | Age: 56
End: 2021-03-29

## 2021-03-29 NOTE — TELEPHONE ENCOUNTER
Pt called with BS reading:    3-24-21       180    4:00am F                      222    9:00am                      222    11:30am                      162     4:oopm    3-25-21        183     4:00am                       182     9:00am                        249     11:30am                        219    4:00pm    3-26-21         202     4:00am                        173     9:00am                        227     11:00am                        231      3:00pm                          3-27-21         232      5:00am                        212      3:30pm    3-28-21         211      6:00am                        250      12 noon                        253      5:00pm    3-29-21         241      4:30am    Currently on 30 U on insulin, takes around 11:15 am each day

## 2021-05-01 ENCOUNTER — APPOINTMENT (OUTPATIENT)
Dept: LAB | Facility: HOSPITAL | Age: 56
End: 2021-05-01
Payer: COMMERCIAL

## 2021-05-01 ENCOUNTER — TRANSCRIBE ORDERS (OUTPATIENT)
Dept: LAB | Facility: HOSPITAL | Age: 56
End: 2021-05-01

## 2021-05-01 DIAGNOSIS — E78.2 MIXED HYPERLIPIDEMIA: Primary | ICD-10-CM

## 2021-05-01 DIAGNOSIS — I10 ESSENTIAL HYPERTENSION, MALIGNANT: ICD-10-CM

## 2021-05-01 DIAGNOSIS — E78.2 MIXED HYPERLIPIDEMIA: ICD-10-CM

## 2021-05-01 LAB
ALBUMIN SERPL BCP-MCNC: 4.3 G/DL (ref 3.5–5.7)
ALP SERPL-CCNC: 129 U/L (ref 40–150)
ALT SERPL W P-5'-P-CCNC: 25 U/L (ref 7–52)
ANION GAP SERPL CALCULATED.3IONS-SCNC: 10 MMOL/L (ref 4–13)
AST SERPL W P-5'-P-CCNC: 20 U/L (ref 13–39)
BASOPHILS # BLD AUTO: 0.1 THOUSANDS/ΜL (ref 0–0.1)
BASOPHILS NFR BLD AUTO: 1 % (ref 0–2)
BILIRUB SERPL-MCNC: 0.4 MG/DL (ref 0.2–1)
BUN SERPL-MCNC: 19 MG/DL (ref 7–25)
CALCIUM SERPL-MCNC: 9.7 MG/DL (ref 8.6–10.5)
CHLORIDE SERPL-SCNC: 103 MMOL/L (ref 98–107)
CHOLEST SERPL-MCNC: 174 MG/DL (ref 0–200)
CO2 SERPL-SCNC: 22 MMOL/L (ref 21–31)
CREAT SERPL-MCNC: 0.68 MG/DL (ref 0.6–1.2)
EOSINOPHIL # BLD AUTO: 0.4 THOUSAND/ΜL (ref 0–0.61)
EOSINOPHIL NFR BLD AUTO: 5 % (ref 0–5)
ERYTHROCYTE [DISTWIDTH] IN BLOOD BY AUTOMATED COUNT: 13.2 % (ref 11.5–14.5)
GFR SERPL CREATININE-BSD FRML MDRD: 97 ML/MIN/1.73SQ M
GLUCOSE P FAST SERPL-MCNC: 188 MG/DL (ref 65–99)
HCT VFR BLD AUTO: 39.8 % (ref 42–47)
HDLC SERPL-MCNC: 41 MG/DL
HGB BLD-MCNC: 13.2 G/DL (ref 12–16)
LDLC SERPL CALC-MCNC: 107 MG/DL (ref 0–100)
LYMPHOCYTES # BLD AUTO: 2.2 THOUSANDS/ΜL (ref 0.6–4.47)
LYMPHOCYTES NFR BLD AUTO: 29 % (ref 21–51)
MCH RBC QN AUTO: 30.2 PG (ref 26–34)
MCHC RBC AUTO-ENTMCNC: 33.2 G/DL (ref 31–37)
MCV RBC AUTO: 91 FL (ref 81–99)
MONOCYTES # BLD AUTO: 0.3 THOUSAND/ΜL (ref 0.17–1.22)
MONOCYTES NFR BLD AUTO: 4 % (ref 2–12)
NEUTROPHILS # BLD AUTO: 4.6 THOUSANDS/ΜL (ref 1.4–6.5)
NEUTS SEG NFR BLD AUTO: 61 % (ref 42–75)
NONHDLC SERPL-MCNC: 133 MG/DL
PLATELET # BLD AUTO: 411 THOUSANDS/UL (ref 149–390)
PMV BLD AUTO: 8.2 FL (ref 8.6–11.7)
POTASSIUM SERPL-SCNC: 4.4 MMOL/L (ref 3.5–5.5)
PROT SERPL-MCNC: 7.6 G/DL (ref 6.4–8.9)
RBC # BLD AUTO: 4.37 MILLION/UL (ref 3.9–5.2)
SODIUM SERPL-SCNC: 135 MMOL/L (ref 134–143)
TRIGL SERPL-MCNC: 129 MG/DL (ref 44–166)
TSH SERPL DL<=0.05 MIU/L-ACNC: 1.06 UIU/ML (ref 0.45–5.33)
WBC # BLD AUTO: 7.5 THOUSAND/UL (ref 4.8–10.8)

## 2021-05-01 PROCEDURE — 80053 COMPREHEN METABOLIC PANEL: CPT

## 2021-05-01 PROCEDURE — 84443 ASSAY THYROID STIM HORMONE: CPT

## 2021-05-01 PROCEDURE — 80061 LIPID PANEL: CPT

## 2021-05-01 PROCEDURE — 85025 COMPLETE CBC W/AUTO DIFF WBC: CPT

## 2021-05-01 PROCEDURE — 36415 COLL VENOUS BLD VENIPUNCTURE: CPT

## 2021-06-15 ENCOUNTER — VBI (OUTPATIENT)
Dept: ADMINISTRATIVE | Facility: OTHER | Age: 56
End: 2021-06-15

## 2021-07-05 ENCOUNTER — TELEPHONE (OUTPATIENT)
Dept: OTHER | Facility: OTHER | Age: 56
End: 2021-07-05

## 2021-07-06 NOTE — TELEPHONE ENCOUNTER
Pt Called in stating she is having a colonoscopy done tomorrow and is currently doing the prep and her Blood sugars are now in the 70's from only drinking fluids today  Pt wants to know if she should continue the prep  TT out to on call provider to make aware

## 2021-07-23 DIAGNOSIS — F41.1 GENERALIZED ANXIETY DISORDER: ICD-10-CM

## 2021-07-23 RX ORDER — BUSPIRONE HYDROCHLORIDE 7.5 MG/1
7.5 TABLET ORAL 2 TIMES DAILY
Qty: 180 TABLET | Refills: 1 | Status: SHIPPED | OUTPATIENT
Start: 2021-07-23

## 2021-10-20 ENCOUNTER — VBI (OUTPATIENT)
Dept: ADMINISTRATIVE | Facility: OTHER | Age: 56
End: 2021-10-20

## 2021-12-21 ENCOUNTER — VBI (OUTPATIENT)
Dept: ADMINISTRATIVE | Facility: OTHER | Age: 56
End: 2021-12-21

## 2022-01-17 ENCOUNTER — VBI (OUTPATIENT)
Dept: ADMINISTRATIVE | Facility: OTHER | Age: 57
End: 2022-01-17

## 2022-01-28 ENCOUNTER — VBI (OUTPATIENT)
Dept: ADMINISTRATIVE | Facility: OTHER | Age: 57
End: 2022-01-28

## 2022-01-29 ENCOUNTER — APPOINTMENT (OUTPATIENT)
Dept: LAB | Facility: HOSPITAL | Age: 57
End: 2022-01-29
Payer: COMMERCIAL

## 2022-01-29 DIAGNOSIS — E55.9 AVITAMINOSIS D: ICD-10-CM

## 2022-01-29 DIAGNOSIS — E11.8 DIABETIC COMPLICATION (HCC): ICD-10-CM

## 2022-01-29 DIAGNOSIS — D47.3 THROMBOCYTHEMIA, ESSENTIAL (HCC): ICD-10-CM

## 2022-01-29 DIAGNOSIS — E78.5 HYPERLIPIDEMIA, UNSPECIFIED HYPERLIPIDEMIA TYPE: ICD-10-CM

## 2022-01-29 DIAGNOSIS — C83.30 RETICULOSARCOMA (HCC): ICD-10-CM

## 2022-01-29 LAB
25(OH)D3 SERPL-MCNC: 50.6 NG/ML (ref 30–100)
ALBUMIN SERPL BCP-MCNC: 4.4 G/DL (ref 3.5–5)
ALP SERPL-CCNC: 124 U/L (ref 34–104)
ALT SERPL W P-5'-P-CCNC: 25 U/L (ref 7–52)
ANION GAP SERPL CALCULATED.3IONS-SCNC: 9 MMOL/L (ref 4–13)
AST SERPL W P-5'-P-CCNC: 25 U/L (ref 13–39)
BASOPHILS # BLD AUTO: 0.08 THOUSANDS/ΜL (ref 0–0.1)
BASOPHILS NFR BLD AUTO: 1 % (ref 0–1)
BILIRUB SERPL-MCNC: 0.39 MG/DL (ref 0.2–1)
BUN SERPL-MCNC: 25 MG/DL (ref 5–25)
CALCIUM SERPL-MCNC: 9.7 MG/DL (ref 8.4–10.2)
CHLORIDE SERPL-SCNC: 101 MMOL/L (ref 96–108)
CHOLEST SERPL-MCNC: 185 MG/DL
CO2 SERPL-SCNC: 23 MMOL/L (ref 21–32)
CREAT SERPL-MCNC: 0.78 MG/DL (ref 0.6–1.3)
EOSINOPHIL # BLD AUTO: 0.36 THOUSAND/ΜL (ref 0–0.61)
EOSINOPHIL NFR BLD AUTO: 4 % (ref 0–6)
ERYTHROCYTE [DISTWIDTH] IN BLOOD BY AUTOMATED COUNT: 13.1 % (ref 11.6–15.1)
GFR SERPL CREATININE-BSD FRML MDRD: 84 ML/MIN/1.73SQ M
GLUCOSE P FAST SERPL-MCNC: 140 MG/DL (ref 65–99)
HCT VFR BLD AUTO: 37.4 % (ref 34.8–46.1)
HDLC SERPL-MCNC: 39 MG/DL
HGB BLD-MCNC: 12.4 G/DL (ref 11.5–15.4)
IMM GRANULOCYTES # BLD AUTO: 0.11 THOUSAND/UL (ref 0–0.2)
IMM GRANULOCYTES NFR BLD AUTO: 1 % (ref 0–2)
LDH SERPL-CCNC: 132 U/L (ref 140–271)
LDLC SERPL CALC-MCNC: 118 MG/DL (ref 0–100)
LYMPHOCYTES # BLD AUTO: 1.95 THOUSANDS/ΜL (ref 0.6–4.47)
LYMPHOCYTES NFR BLD AUTO: 23 % (ref 14–44)
MCH RBC QN AUTO: 31 PG (ref 26.8–34.3)
MCHC RBC AUTO-ENTMCNC: 33.2 G/DL (ref 31.4–37.4)
MCV RBC AUTO: 94 FL (ref 82–98)
MONOCYTES # BLD AUTO: 0.42 THOUSAND/ΜL (ref 0.17–1.22)
MONOCYTES NFR BLD AUTO: 5 % (ref 4–12)
NEUTROPHILS # BLD AUTO: 5.6 THOUSANDS/ΜL (ref 1.85–7.62)
NEUTS SEG NFR BLD AUTO: 66 % (ref 43–75)
NONHDLC SERPL-MCNC: 146 MG/DL
NRBC BLD AUTO-RTO: 0 /100 WBCS
PLATELET # BLD AUTO: 417 THOUSANDS/UL (ref 149–390)
PMV BLD AUTO: 9.9 FL (ref 8.9–12.7)
POTASSIUM SERPL-SCNC: 4.9 MMOL/L (ref 3.5–5.3)
PROT SERPL-MCNC: 7.8 G/DL (ref 6.4–8.4)
RBC # BLD AUTO: 4 MILLION/UL (ref 3.81–5.12)
SODIUM SERPL-SCNC: 133 MMOL/L (ref 135–147)
TRIGL SERPL-MCNC: 141 MG/DL
TSH SERPL DL<=0.05 MIU/L-ACNC: 1.18 UIU/ML (ref 0.45–5.33)
WBC # BLD AUTO: 8.52 THOUSAND/UL (ref 4.31–10.16)

## 2022-01-29 PROCEDURE — 82306 VITAMIN D 25 HYDROXY: CPT

## 2022-01-29 PROCEDURE — 84443 ASSAY THYROID STIM HORMONE: CPT

## 2022-01-29 PROCEDURE — 80053 COMPREHEN METABOLIC PANEL: CPT

## 2022-01-29 PROCEDURE — 80061 LIPID PANEL: CPT

## 2022-01-29 PROCEDURE — 85025 COMPLETE CBC W/AUTO DIFF WBC: CPT

## 2022-01-29 PROCEDURE — 36415 COLL VENOUS BLD VENIPUNCTURE: CPT

## 2022-01-29 PROCEDURE — 83615 LACTATE (LD) (LDH) ENZYME: CPT

## 2022-05-04 ENCOUNTER — VBI (OUTPATIENT)
Dept: ADMINISTRATIVE | Facility: OTHER | Age: 57
End: 2022-05-04

## 2022-07-14 ENCOUNTER — VBI (OUTPATIENT)
Dept: ADMINISTRATIVE | Facility: OTHER | Age: 57
End: 2022-07-14

## 2022-08-02 ENCOUNTER — APPOINTMENT (OUTPATIENT)
Dept: LAB | Facility: HOSPITAL | Age: 57
End: 2022-08-02
Payer: COMMERCIAL

## 2022-08-02 DIAGNOSIS — E55.9 VITAMIN D DEFICIENCY: ICD-10-CM

## 2022-08-02 DIAGNOSIS — E78.5 HYPERLIPIDEMIA, UNSPECIFIED HYPERLIPIDEMIA TYPE: ICD-10-CM

## 2022-08-02 DIAGNOSIS — E11.8 DIABETIC COMPLICATION (HCC): ICD-10-CM

## 2022-08-02 LAB
25(OH)D3 SERPL-MCNC: 71.1 NG/ML (ref 30–100)
ALBUMIN SERPL BCP-MCNC: 4.3 G/DL (ref 3.5–5)
ALP SERPL-CCNC: 133 U/L (ref 34–104)
ALT SERPL W P-5'-P-CCNC: 18 U/L (ref 7–52)
ANION GAP SERPL CALCULATED.3IONS-SCNC: 10 MMOL/L (ref 4–13)
AST SERPL W P-5'-P-CCNC: 17 U/L (ref 13–39)
BASOPHILS # BLD AUTO: 0.07 THOUSANDS/ΜL (ref 0–0.1)
BASOPHILS NFR BLD AUTO: 1 % (ref 0–1)
BILIRUB SERPL-MCNC: 0.37 MG/DL (ref 0.2–1)
BUN SERPL-MCNC: 25 MG/DL (ref 5–25)
CALCIUM SERPL-MCNC: 9.8 MG/DL (ref 8.4–10.2)
CHLORIDE SERPL-SCNC: 103 MMOL/L (ref 96–108)
CHOLEST SERPL-MCNC: 179 MG/DL
CO2 SERPL-SCNC: 22 MMOL/L (ref 21–32)
CREAT SERPL-MCNC: 0.85 MG/DL (ref 0.6–1.3)
EOSINOPHIL # BLD AUTO: 0.44 THOUSAND/ΜL (ref 0–0.61)
EOSINOPHIL NFR BLD AUTO: 5 % (ref 0–6)
ERYTHROCYTE [DISTWIDTH] IN BLOOD BY AUTOMATED COUNT: 13.3 % (ref 11.6–15.1)
GFR SERPL CREATININE-BSD FRML MDRD: 75 ML/MIN/1.73SQ M
GLUCOSE P FAST SERPL-MCNC: 143 MG/DL (ref 65–99)
HCT VFR BLD AUTO: 38.9 % (ref 34.8–46.1)
HDLC SERPL-MCNC: 37 MG/DL
HGB BLD-MCNC: 12.5 G/DL (ref 11.5–15.4)
IMM GRANULOCYTES # BLD AUTO: 0.2 THOUSAND/UL (ref 0–0.2)
IMM GRANULOCYTES NFR BLD AUTO: 2 % (ref 0–2)
LDLC SERPL CALC-MCNC: 114 MG/DL (ref 0–100)
LYMPHOCYTES # BLD AUTO: 2.1 THOUSANDS/ΜL (ref 0.6–4.47)
LYMPHOCYTES NFR BLD AUTO: 25 % (ref 14–44)
MAGNESIUM SERPL-MCNC: 1.3 MG/DL (ref 1.9–2.7)
MCH RBC QN AUTO: 30.5 PG (ref 26.8–34.3)
MCHC RBC AUTO-ENTMCNC: 32.1 G/DL (ref 31.4–37.4)
MCV RBC AUTO: 95 FL (ref 82–98)
MONOCYTES # BLD AUTO: 0.44 THOUSAND/ΜL (ref 0.17–1.22)
MONOCYTES NFR BLD AUTO: 5 % (ref 4–12)
NEUTROPHILS # BLD AUTO: 5.23 THOUSANDS/ΜL (ref 1.85–7.62)
NEUTS SEG NFR BLD AUTO: 62 % (ref 43–75)
NONHDLC SERPL-MCNC: 142 MG/DL
NRBC BLD AUTO-RTO: 0 /100 WBCS
PLATELET # BLD AUTO: 366 THOUSANDS/UL (ref 149–390)
PMV BLD AUTO: 10.1 FL (ref 8.9–12.7)
POTASSIUM SERPL-SCNC: 4.7 MMOL/L (ref 3.5–5.3)
PROT SERPL-MCNC: 7.4 G/DL (ref 6.4–8.4)
RBC # BLD AUTO: 4.1 MILLION/UL (ref 3.81–5.12)
SODIUM SERPL-SCNC: 135 MMOL/L (ref 135–147)
T3FREE SERPL-MCNC: 2.61 PG/ML (ref 2.3–4.2)
T4 FREE SERPL-MCNC: 1.12 NG/DL (ref 0.76–1.46)
TRIGL SERPL-MCNC: 141 MG/DL
TSH SERPL DL<=0.05 MIU/L-ACNC: 1.23 UIU/ML (ref 0.45–4.5)
WBC # BLD AUTO: 8.48 THOUSAND/UL (ref 4.31–10.16)

## 2022-08-02 PROCEDURE — 83735 ASSAY OF MAGNESIUM: CPT

## 2022-08-02 PROCEDURE — 80053 COMPREHEN METABOLIC PANEL: CPT

## 2022-08-02 PROCEDURE — 80061 LIPID PANEL: CPT

## 2022-08-02 PROCEDURE — 84481 FREE ASSAY (FT-3): CPT

## 2022-08-02 PROCEDURE — 84443 ASSAY THYROID STIM HORMONE: CPT

## 2022-08-02 PROCEDURE — 85025 COMPLETE CBC W/AUTO DIFF WBC: CPT

## 2022-08-02 PROCEDURE — 36415 COLL VENOUS BLD VENIPUNCTURE: CPT

## 2022-08-02 PROCEDURE — 82306 VITAMIN D 25 HYDROXY: CPT

## 2022-08-02 PROCEDURE — 84439 ASSAY OF FREE THYROXINE: CPT

## 2023-01-05 NOTE — TELEPHONE ENCOUNTER
05/04/22 11:56 AM     See documentation in the [employer] Smart[employer]       Emiliana Cuellar Pre-Op Diagnosis:  ICD shocks, elevated serum troponins  Post-Op Diagnosis:  Severe coronary artery disease involving mid-distal LAD  Procedure:  Left heart catheterization and left ventriculography, selective left and right coronary angiography, attempted PCI to the mid-distal LAD  Anesthesia: Local  EBL: Minimal   Specimens:  None  Findings:  Severe CAD involving mid-distal LAD (possible chronic subtotal occlusion); unsuccessful attempt at PCI.  Complications: None  Disposition:  Patient tolerated the procedure well    This document has been electronically signed by Jose Antonio Carrero MD on January 5, 2023 10:22 CST

## 2023-02-02 ENCOUNTER — APPOINTMENT (OUTPATIENT)
Dept: LAB | Facility: HOSPITAL | Age: 58
End: 2023-02-02

## 2023-02-02 DIAGNOSIS — C83.38 RETICULOSARCOMA OF LYMPH NODES OF MULTIPLE SITES (HCC): ICD-10-CM

## 2023-02-02 LAB
ALBUMIN SERPL BCP-MCNC: 4.4 G/DL (ref 3.5–5)
ALP SERPL-CCNC: 135 U/L (ref 34–104)
ALT SERPL W P-5'-P-CCNC: 24 U/L (ref 7–52)
ANION GAP SERPL CALCULATED.3IONS-SCNC: 10 MMOL/L (ref 4–13)
AST SERPL W P-5'-P-CCNC: 23 U/L (ref 13–39)
BASOPHILS # BLD AUTO: 0.08 THOUSANDS/ÂΜL (ref 0–0.1)
BASOPHILS NFR BLD AUTO: 1 % (ref 0–1)
BILIRUB SERPL-MCNC: 0.38 MG/DL (ref 0.2–1)
BUN SERPL-MCNC: 28 MG/DL (ref 5–25)
CALCIUM SERPL-MCNC: 9.8 MG/DL (ref 8.4–10.2)
CHLORIDE SERPL-SCNC: 105 MMOL/L (ref 96–108)
CO2 SERPL-SCNC: 21 MMOL/L (ref 21–32)
CREAT SERPL-MCNC: 0.79 MG/DL (ref 0.6–1.3)
EOSINOPHIL # BLD AUTO: 0.47 THOUSAND/ÂΜL (ref 0–0.61)
EOSINOPHIL NFR BLD AUTO: 7 % (ref 0–6)
ERYTHROCYTE [DISTWIDTH] IN BLOOD BY AUTOMATED COUNT: 13 % (ref 11.6–15.1)
GFR SERPL CREATININE-BSD FRML MDRD: 82 ML/MIN/1.73SQ M
GLUCOSE P FAST SERPL-MCNC: 163 MG/DL (ref 65–99)
HCT VFR BLD AUTO: 38.2 % (ref 34.8–46.1)
HGB BLD-MCNC: 12.5 G/DL (ref 11.5–15.4)
IMM GRANULOCYTES # BLD AUTO: 0.1 THOUSAND/UL (ref 0–0.2)
IMM GRANULOCYTES NFR BLD AUTO: 1 % (ref 0–2)
LDH SERPL-CCNC: 134 U/L (ref 140–271)
LYMPHOCYTES # BLD AUTO: 2.15 THOUSANDS/ÂΜL (ref 0.6–4.47)
LYMPHOCYTES NFR BLD AUTO: 31 % (ref 14–44)
MCH RBC QN AUTO: 30.7 PG (ref 26.8–34.3)
MCHC RBC AUTO-ENTMCNC: 32.7 G/DL (ref 31.4–37.4)
MCV RBC AUTO: 94 FL (ref 82–98)
MONOCYTES # BLD AUTO: 0.35 THOUSAND/ÂΜL (ref 0.17–1.22)
MONOCYTES NFR BLD AUTO: 5 % (ref 4–12)
NEUTROPHILS # BLD AUTO: 3.89 THOUSANDS/ÂΜL (ref 1.85–7.62)
NEUTS SEG NFR BLD AUTO: 55 % (ref 43–75)
NRBC BLD AUTO-RTO: 0 /100 WBCS
PLATELET # BLD AUTO: 420 THOUSANDS/UL (ref 149–390)
PMV BLD AUTO: 10 FL (ref 8.9–12.7)
POTASSIUM SERPL-SCNC: 5 MMOL/L (ref 3.5–5.3)
PROT SERPL-MCNC: 7.6 G/DL (ref 6.4–8.4)
RBC # BLD AUTO: 4.07 MILLION/UL (ref 3.81–5.12)
SODIUM SERPL-SCNC: 136 MMOL/L (ref 135–147)
WBC # BLD AUTO: 7.04 THOUSAND/UL (ref 4.31–10.16)

## 2024-02-10 ENCOUNTER — APPOINTMENT (OUTPATIENT)
Dept: LAB | Facility: HOSPITAL | Age: 59
End: 2024-02-10
Payer: COMMERCIAL

## 2024-02-10 DIAGNOSIS — E11.69 DIABETES MELLITUS ASSOCIATED WITH HORMONAL ETIOLOGY (HCC): ICD-10-CM

## 2024-02-10 DIAGNOSIS — C83.38 RETICULOSARCOMA OF LYMPH NODES OF MULTIPLE SITES (HCC): ICD-10-CM

## 2024-02-10 DIAGNOSIS — Z13.29 SCREENING FOR THYROID DISORDER: ICD-10-CM

## 2024-02-10 DIAGNOSIS — I15.2 ENDOCRINE HYPERTENSION: ICD-10-CM

## 2024-02-10 LAB
ALBUMIN SERPL BCP-MCNC: 4.3 G/DL (ref 3.5–5)
ALP SERPL-CCNC: 133 U/L (ref 34–104)
ALT SERPL W P-5'-P-CCNC: 19 U/L (ref 7–52)
ANION GAP SERPL CALCULATED.3IONS-SCNC: 12 MMOL/L
AST SERPL W P-5'-P-CCNC: 22 U/L (ref 13–39)
BASOPHILS # BLD AUTO: 0.08 THOUSANDS/ÂΜL (ref 0–0.1)
BASOPHILS NFR BLD AUTO: 1 % (ref 0–1)
BILIRUB SERPL-MCNC: 0.33 MG/DL (ref 0.2–1)
BUN SERPL-MCNC: 25 MG/DL (ref 5–25)
CALCIUM SERPL-MCNC: 9.5 MG/DL (ref 8.4–10.2)
CHLORIDE SERPL-SCNC: 105 MMOL/L (ref 96–108)
CHOLEST SERPL-MCNC: 181 MG/DL
CO2 SERPL-SCNC: 20 MMOL/L (ref 21–32)
CREAT SERPL-MCNC: 0.72 MG/DL (ref 0.6–1.3)
CREAT UR-MCNC: 74 MG/DL
EOSINOPHIL # BLD AUTO: 0.44 THOUSAND/ÂΜL (ref 0–0.61)
EOSINOPHIL NFR BLD AUTO: 6 % (ref 0–6)
ERYTHROCYTE [DISTWIDTH] IN BLOOD BY AUTOMATED COUNT: 13.4 % (ref 11.6–15.1)
EST. AVERAGE GLUCOSE BLD GHB EST-MCNC: 183 MG/DL
GFR SERPL CREATININE-BSD FRML MDRD: 91 ML/MIN/1.73SQ M
GLUCOSE P FAST SERPL-MCNC: 127 MG/DL (ref 65–99)
HBA1C MFR BLD: 8 %
HCT VFR BLD AUTO: 40.2 % (ref 34.8–46.1)
HDLC SERPL-MCNC: 40 MG/DL
HGB BLD-MCNC: 12.8 G/DL (ref 11.5–15.4)
IMM GRANULOCYTES # BLD AUTO: 0.12 THOUSAND/UL (ref 0–0.2)
IMM GRANULOCYTES NFR BLD AUTO: 2 % (ref 0–2)
LDH SERPL-CCNC: 149 U/L (ref 140–271)
LDLC SERPL CALC-MCNC: 108 MG/DL (ref 0–100)
LYMPHOCYTES # BLD AUTO: 2.12 THOUSANDS/ÂΜL (ref 0.6–4.47)
LYMPHOCYTES NFR BLD AUTO: 26 % (ref 14–44)
MCH RBC QN AUTO: 30.4 PG (ref 26.8–34.3)
MCHC RBC AUTO-ENTMCNC: 31.8 G/DL (ref 31.4–37.4)
MCV RBC AUTO: 96 FL (ref 82–98)
MONOCYTES # BLD AUTO: 0.4 THOUSAND/ÂΜL (ref 0.17–1.22)
MONOCYTES NFR BLD AUTO: 5 % (ref 4–12)
NEUTROPHILS # BLD AUTO: 4.87 THOUSANDS/ÂΜL (ref 1.85–7.62)
NEUTS SEG NFR BLD AUTO: 60 % (ref 43–75)
NONHDLC SERPL-MCNC: 141 MG/DL
NRBC BLD AUTO-RTO: 0 /100 WBCS
PLATELET # BLD AUTO: 448 THOUSANDS/UL (ref 149–390)
PMV BLD AUTO: 9.8 FL (ref 8.9–12.7)
POTASSIUM SERPL-SCNC: 4.6 MMOL/L (ref 3.5–5.3)
PROT SERPL-MCNC: 7.8 G/DL (ref 6.4–8.4)
PROT UR-MCNC: 38 MG/DL
PROT/CREAT UR: 0.51 MG/G{CREAT} (ref 0–0.1)
RBC # BLD AUTO: 4.21 MILLION/UL (ref 3.81–5.12)
SODIUM SERPL-SCNC: 137 MMOL/L (ref 135–147)
TRIGL SERPL-MCNC: 164 MG/DL
TSH SERPL DL<=0.05 MIU/L-ACNC: 1.1 UIU/ML (ref 0.45–4.5)
WBC # BLD AUTO: 8.03 THOUSAND/UL (ref 4.31–10.16)

## 2024-02-10 PROCEDURE — 82570 ASSAY OF URINE CREATININE: CPT

## 2024-02-10 PROCEDURE — 84156 ASSAY OF PROTEIN URINE: CPT

## 2024-02-10 PROCEDURE — 80061 LIPID PANEL: CPT

## 2024-02-10 PROCEDURE — 83615 LACTATE (LD) (LDH) ENZYME: CPT

## 2024-02-10 PROCEDURE — 83036 HEMOGLOBIN GLYCOSYLATED A1C: CPT

## 2024-02-10 PROCEDURE — 80053 COMPREHEN METABOLIC PANEL: CPT

## 2024-02-10 PROCEDURE — 36415 COLL VENOUS BLD VENIPUNCTURE: CPT

## 2024-02-10 PROCEDURE — 85025 COMPLETE CBC W/AUTO DIFF WBC: CPT

## 2024-02-10 PROCEDURE — 84443 ASSAY THYROID STIM HORMONE: CPT

## 2024-02-21 PROBLEM — H10.13 ALLERGIC CONJUNCTIVITIS OF BOTH EYES: Status: RESOLVED | Noted: 2019-10-22 | Resolved: 2024-02-21

## 2024-06-12 DIAGNOSIS — Z00.6 ENCOUNTER FOR EXAMINATION FOR NORMAL COMPARISON OR CONTROL IN CLINICAL RESEARCH PROGRAM: ICD-10-CM

## 2024-06-17 ENCOUNTER — APPOINTMENT (OUTPATIENT)
Dept: LAB | Facility: HOSPITAL | Age: 59
End: 2024-06-17

## 2024-06-17 DIAGNOSIS — Z00.6 ENCOUNTER FOR EXAMINATION FOR NORMAL COMPARISON OR CONTROL IN CLINICAL RESEARCH PROGRAM: ICD-10-CM

## 2024-06-17 PROCEDURE — 36415 COLL VENOUS BLD VENIPUNCTURE: CPT

## 2024-08-26 LAB
APOB+LDLR+PCSK9 GENE MUT ANL BLD/T: NOT DETECTED
BRCA1+BRCA2 DEL+DUP + FULL MUT ANL BLD/T: NOT DETECTED
MLH1+MSH2+MSH6+PMS2 GN DEL+DUP+FUL M: NOT DETECTED

## 2025-03-04 ENCOUNTER — APPOINTMENT (OUTPATIENT)
Dept: LAB | Facility: HOSPITAL | Age: 60
End: 2025-03-04
Payer: COMMERCIAL

## 2025-03-04 DIAGNOSIS — Z79.4 TYPE 2 DIABETES MELLITUS WITHOUT COMPLICATION, WITH LONG-TERM CURRENT USE OF INSULIN (HCC): ICD-10-CM

## 2025-03-04 DIAGNOSIS — E11.9 TYPE 2 DIABETES MELLITUS WITHOUT COMPLICATION, WITH LONG-TERM CURRENT USE OF INSULIN (HCC): ICD-10-CM

## 2025-03-04 LAB
ALBUMIN SERPL BCG-MCNC: 4.2 G/DL (ref 3.5–5)
ALP SERPL-CCNC: 112 U/L (ref 34–104)
ALT SERPL W P-5'-P-CCNC: 20 U/L (ref 7–52)
ANION GAP SERPL CALCULATED.3IONS-SCNC: 10 MMOL/L (ref 4–13)
AST SERPL W P-5'-P-CCNC: 19 U/L (ref 13–39)
BILIRUB SERPL-MCNC: 0.28 MG/DL (ref 0.2–1)
BUN SERPL-MCNC: 27 MG/DL (ref 5–25)
CALCIUM SERPL-MCNC: 9.7 MG/DL (ref 8.4–10.2)
CHLORIDE SERPL-SCNC: 105 MMOL/L (ref 96–108)
CHOLEST SERPL-MCNC: 198 MG/DL (ref ?–200)
CO2 SERPL-SCNC: 19 MMOL/L (ref 21–32)
CREAT SERPL-MCNC: 0.73 MG/DL (ref 0.6–1.3)
CREAT UR-MCNC: 65.4 MG/DL
EST. AVERAGE GLUCOSE BLD GHB EST-MCNC: 192 MG/DL
GFR SERPL CREATININE-BSD FRML MDRD: 89 ML/MIN/1.73SQ M
GLUCOSE P FAST SERPL-MCNC: 141 MG/DL (ref 65–99)
HBA1C MFR BLD: 8.3 %
HDLC SERPL-MCNC: 46 MG/DL
LDLC SERPL CALC-MCNC: 123 MG/DL (ref 0–100)
MICROALBUMIN UR-MCNC: 222.8 MG/L
MICROALBUMIN/CREAT 24H UR: 341 MG/G CREATININE (ref 0–30)
NONHDLC SERPL-MCNC: 152 MG/DL
POTASSIUM SERPL-SCNC: 4.6 MMOL/L (ref 3.5–5.3)
PROT SERPL-MCNC: 7.4 G/DL (ref 6.4–8.4)
SODIUM SERPL-SCNC: 134 MMOL/L (ref 135–147)
TRIGL SERPL-MCNC: 145 MG/DL (ref ?–150)

## 2025-03-04 PROCEDURE — 82043 UR ALBUMIN QUANTITATIVE: CPT

## 2025-03-04 PROCEDURE — 82570 ASSAY OF URINE CREATININE: CPT

## 2025-03-04 PROCEDURE — 83036 HEMOGLOBIN GLYCOSYLATED A1C: CPT

## 2025-03-04 PROCEDURE — 36415 COLL VENOUS BLD VENIPUNCTURE: CPT

## 2025-03-04 PROCEDURE — 80061 LIPID PANEL: CPT

## 2025-03-04 PROCEDURE — 80053 COMPREHEN METABOLIC PANEL: CPT

## 2025-07-02 ENCOUNTER — LAB (OUTPATIENT)
Dept: LAB | Facility: HOSPITAL | Age: 60
End: 2025-07-02
Payer: COMMERCIAL

## 2025-07-02 ENCOUNTER — TRANSCRIBE ORDERS (OUTPATIENT)
Dept: LAB | Facility: HOSPITAL | Age: 60
End: 2025-07-02

## 2025-07-02 DIAGNOSIS — E83.42 HYPOMAGNESEMIA: ICD-10-CM

## 2025-07-02 DIAGNOSIS — E13.69 OTHER SPECIFIED DIABETES MELLITUS WITH OTHER SPECIFIED COMPLICATION, UNSPECIFIED WHETHER LONG TERM INSULIN USE (HCC): ICD-10-CM

## 2025-07-02 DIAGNOSIS — N39.0 URINARY TRACT INFECTION WITHOUT HEMATURIA, SITE UNSPECIFIED: ICD-10-CM

## 2025-07-02 DIAGNOSIS — E13.69 OTHER SPECIFIED DIABETES MELLITUS WITH OTHER SPECIFIED COMPLICATION, UNSPECIFIED WHETHER LONG TERM INSULIN USE (HCC): Primary | ICD-10-CM

## 2025-07-02 LAB
ALBUMIN SERPL BCG-MCNC: 4.3 G/DL (ref 3.5–5)
ALP SERPL-CCNC: 128 U/L (ref 34–104)
ALT SERPL W P-5'-P-CCNC: 27 U/L (ref 7–52)
ANION GAP SERPL CALCULATED.3IONS-SCNC: 10 MMOL/L (ref 4–13)
AST SERPL W P-5'-P-CCNC: 22 U/L (ref 13–39)
BASOPHILS # BLD AUTO: 0.08 THOUSANDS/ÂΜL (ref 0–0.1)
BASOPHILS NFR BLD AUTO: 1 % (ref 0–1)
BILIRUB SERPL-MCNC: 0.3 MG/DL (ref 0.2–1)
BUN SERPL-MCNC: 25 MG/DL (ref 5–25)
CALCIUM SERPL-MCNC: 9.5 MG/DL (ref 8.4–10.2)
CHLORIDE SERPL-SCNC: 107 MMOL/L (ref 96–108)
CO2 SERPL-SCNC: 17 MMOL/L (ref 21–32)
CREAT SERPL-MCNC: 0.83 MG/DL (ref 0.6–1.3)
CREAT UR-MCNC: 140.3 MG/DL
EOSINOPHIL # BLD AUTO: 0.54 THOUSAND/ÂΜL (ref 0–0.61)
EOSINOPHIL NFR BLD AUTO: 6 % (ref 0–6)
ERYTHROCYTE [DISTWIDTH] IN BLOOD BY AUTOMATED COUNT: 13.5 % (ref 11.6–15.1)
EST. AVERAGE GLUCOSE BLD GHB EST-MCNC: 186 MG/DL
GFR SERPL CREATININE-BSD FRML MDRD: 76 ML/MIN/1.73SQ M
GLUCOSE P FAST SERPL-MCNC: 170 MG/DL (ref 65–99)
HBA1C MFR BLD: 8.1 %
HCT VFR BLD AUTO: 37.2 % (ref 34.8–46.1)
HGB BLD-MCNC: 12.3 G/DL (ref 11.5–15.4)
IMM GRANULOCYTES # BLD AUTO: 0.14 THOUSAND/UL (ref 0–0.2)
IMM GRANULOCYTES NFR BLD AUTO: 2 % (ref 0–2)
IRON SATN MFR SERPL: 20 % (ref 15–50)
IRON SERPL-MCNC: 102 UG/DL (ref 50–212)
LYMPHOCYTES # BLD AUTO: 2.07 THOUSANDS/ÂΜL (ref 0.6–4.47)
LYMPHOCYTES NFR BLD AUTO: 22 % (ref 14–44)
MAGNESIUM SERPL-MCNC: 1.5 MG/DL (ref 1.9–2.7)
MCH RBC QN AUTO: 31.1 PG (ref 26.8–34.3)
MCHC RBC AUTO-ENTMCNC: 33.1 G/DL (ref 31.4–37.4)
MCV RBC AUTO: 94 FL (ref 82–98)
MICROALBUMIN UR-MCNC: 301.5 MG/L
MICROALBUMIN/CREAT 24H UR: 215 MG/G CREATININE (ref 0–30)
MONOCYTES # BLD AUTO: 0.39 THOUSAND/ÂΜL (ref 0.17–1.22)
MONOCYTES NFR BLD AUTO: 4 % (ref 4–12)
NEUTROPHILS # BLD AUTO: 6.21 THOUSANDS/ÂΜL (ref 1.85–7.62)
NEUTS SEG NFR BLD AUTO: 65 % (ref 43–75)
NRBC BLD AUTO-RTO: 0 /100 WBCS
PLATELET # BLD AUTO: 427 THOUSANDS/UL (ref 149–390)
PMV BLD AUTO: 10.1 FL (ref 8.9–12.7)
POTASSIUM SERPL-SCNC: 5.2 MMOL/L (ref 3.5–5.3)
PROT SERPL-MCNC: 7.6 G/DL (ref 6.4–8.4)
PTH-INTACT SERPL-MCNC: 13.5 PG/ML (ref 12–88)
RBC # BLD AUTO: 3.96 MILLION/UL (ref 3.81–5.12)
SODIUM SERPL-SCNC: 134 MMOL/L (ref 135–147)
T3 SERPL-MCNC: 0.8 NG/ML (ref 0.9–1.8)
T4 FREE SERPL-MCNC: 0.73 NG/DL (ref 0.61–1.12)
TIBC SERPL-MCNC: 512.4 UG/DL (ref 250–450)
TRANSFERRIN SERPL-MCNC: 366 MG/DL (ref 203–362)
TSH SERPL DL<=0.05 MIU/L-ACNC: 1.45 UIU/ML (ref 0.45–4.5)
UIBC SERPL-MCNC: 410 UG/DL (ref 155–355)
WBC # BLD AUTO: 9.43 THOUSAND/UL (ref 4.31–10.16)

## 2025-07-02 PROCEDURE — 84480 ASSAY TRIIODOTHYRONINE (T3): CPT

## 2025-07-02 PROCEDURE — 84443 ASSAY THYROID STIM HORMONE: CPT

## 2025-07-02 PROCEDURE — 87077 CULTURE AEROBIC IDENTIFY: CPT

## 2025-07-02 PROCEDURE — 36415 COLL VENOUS BLD VENIPUNCTURE: CPT

## 2025-07-02 PROCEDURE — 82043 UR ALBUMIN QUANTITATIVE: CPT

## 2025-07-02 PROCEDURE — 83735 ASSAY OF MAGNESIUM: CPT

## 2025-07-02 PROCEDURE — 80053 COMPREHEN METABOLIC PANEL: CPT

## 2025-07-02 PROCEDURE — 83970 ASSAY OF PARATHORMONE: CPT

## 2025-07-02 PROCEDURE — 83540 ASSAY OF IRON: CPT

## 2025-07-02 PROCEDURE — 82570 ASSAY OF URINE CREATININE: CPT

## 2025-07-02 PROCEDURE — 87086 URINE CULTURE/COLONY COUNT: CPT

## 2025-07-02 PROCEDURE — 83036 HEMOGLOBIN GLYCOSYLATED A1C: CPT

## 2025-07-02 PROCEDURE — 84439 ASSAY OF FREE THYROXINE: CPT

## 2025-07-02 PROCEDURE — 85025 COMPLETE CBC W/AUTO DIFF WBC: CPT

## 2025-07-02 PROCEDURE — 87186 SC STD MICRODIL/AGAR DIL: CPT

## 2025-07-02 PROCEDURE — 83550 IRON BINDING TEST: CPT

## 2025-07-04 LAB — BACTERIA UR CULT: ABNORMAL
